# Patient Record
Sex: MALE | Race: WHITE | NOT HISPANIC OR LATINO | Employment: FULL TIME | URBAN - METROPOLITAN AREA
[De-identification: names, ages, dates, MRNs, and addresses within clinical notes are randomized per-mention and may not be internally consistent; named-entity substitution may affect disease eponyms.]

---

## 2017-06-28 ENCOUNTER — OFFICE VISIT - HEALTHEAST (OUTPATIENT)
Dept: INTERNAL MEDICINE | Facility: CLINIC | Age: 52
End: 2017-06-28

## 2017-06-28 DIAGNOSIS — R68.82 LOW LIBIDO: ICD-10-CM

## 2017-06-28 DIAGNOSIS — K76.0 FATTY LIVER: ICD-10-CM

## 2017-06-28 DIAGNOSIS — Z00.00 ROUTINE GENERAL MEDICAL EXAMINATION AT A HEALTH CARE FACILITY: ICD-10-CM

## 2017-06-28 DIAGNOSIS — Z23 NEED FOR TETANUS BOOSTER: ICD-10-CM

## 2017-06-28 LAB
CHOLEST SERPL-MCNC: 189 MG/DL
FASTING STATUS PATIENT QL REPORTED: YES
HDLC SERPL-MCNC: 39 MG/DL
LDLC SERPL CALC-MCNC: 132 MG/DL
PSA SERPL-MCNC: 0.5 NG/ML (ref 0–3.5)
TRIGL SERPL-MCNC: 90 MG/DL

## 2017-06-28 ASSESSMENT — MIFFLIN-ST. JEOR: SCORE: 2090.45

## 2018-12-09 ENCOUNTER — OFFICE VISIT (OUTPATIENT)
Dept: URGENT CARE | Facility: URGENT CARE | Age: 53
End: 2018-12-09
Payer: COMMERCIAL

## 2018-12-09 VITALS
WEIGHT: 288 LBS | DIASTOLIC BLOOD PRESSURE: 78 MMHG | RESPIRATION RATE: 20 BRPM | HEART RATE: 78 BPM | SYSTOLIC BLOOD PRESSURE: 120 MMHG | OXYGEN SATURATION: 96 % | TEMPERATURE: 98.1 F

## 2018-12-09 DIAGNOSIS — B34.9 VIRAL SYNDROME: Primary | ICD-10-CM

## 2018-12-09 DIAGNOSIS — R05.9 COUGH: ICD-10-CM

## 2018-12-09 PROCEDURE — 99213 OFFICE O/P EST LOW 20 MIN: CPT | Performed by: NURSE PRACTITIONER

## 2018-12-09 RX ORDER — BENZONATATE 200 MG/1
200 CAPSULE ORAL 3 TIMES DAILY PRN
Qty: 30 CAPSULE | Refills: 0 | Status: SHIPPED | OUTPATIENT
Start: 2018-12-09 | End: 2022-09-28

## 2018-12-09 RX ORDER — PREDNISONE 20 MG/1
40 TABLET ORAL DAILY
Qty: 10 TABLET | Refills: 0 | Status: SHIPPED | OUTPATIENT
Start: 2018-12-09 | End: 2018-12-14

## 2018-12-09 NOTE — PROGRESS NOTES
SUBJECTIVE:   Juanito Delacruz is a 53 year old male who presents to clinic today for the following health issues:  Chief Complaint   Patient presents with     Cough     Started 5 nights ago.  Not improving.  Fatigued from coughing.  Non productive cough.  Robutissin made worse                  Problem list and histories reviewed & adjusted, as indicated.  Additional history: as documented    Patient Active Problem List   Diagnosis     Chronic suppurative otitis media     Cervicalgia     Lumbago     History reviewed. No pertinent surgical history.    Social History     Tobacco Use     Smoking status: Never Smoker     Smokeless tobacco: Never Used   Substance Use Topics     Alcohol use: Yes     Comment: social     Family History   Problem Relation Age of Onset     Pancreatic Cancer Mother          Current Outpatient Medications   Medication Sig Dispense Refill     benzonatate (TESSALON) 200 MG capsule Take 1 capsule (200 mg) by mouth 3 times daily as needed for cough 30 capsule 0     predniSONE (DELTASONE) 20 MG tablet Take 40 mg by mouth daily for 5 days. 10 tablet 0     No Known Allergies  Labs reviewed in EPIC    Reviewed and updated as needed this visit by clinical staff  Tobacco  Allergies  Meds  Problems  Med Hx  Surg Hx  Fam Hx  Soc Hx        Reviewed and updated as needed this visit by Provider  Tobacco  Allergies  Problems  Med Hx  Surg Hx  Fam Hx         ROS:  Constitutional, HEENT, cardiovascular, pulmonary, GI, , musculoskeletal, neuro, skin, endocrine and psych systems are negative, except as otherwise noted.    OBJECTIVE:     /78 (BP Location: Right arm, Cuff Size: Adult Large)   Pulse 78   Temp 98.1  F (36.7  C) (Tympanic)   Resp 20   Wt 130.6 kg (288 lb)   SpO2 96%   There is no height or weight on file to calculate BMI.   GENERAL: healthy, alert and no distress, nontoxic in appearance  EYES: Eyes grossly normal to inspection, PERRL and conjunctivae and sclerae normal  HENT:  ear canals and TM's normal, nose and mouth without ulcers or lesions  NECK: no adenopathy, supple with full ROM  RESP: lungs clear to auscultation - no rales, rhonchi or wheezes  CV: regular rate and rhythm, normal S1 S2, no S3 or S4, no murmur, click or rub, no peripheral edema   ABDOMEN: soft, nontender, no hepatosplenomegaly, no masses and bowel sounds normal  MS: no gross musculoskeletal defects noted, no edema  No rash    Diagnostic Test Results:  No results found for this or any previous visit (from the past 24 hour(s)).    ASSESSMENT/PLAN:     Problem List Items Addressed This Visit     None      Visit Diagnoses     Viral syndrome    -  Primary    Relevant Medications    predniSONE (DELTASONE) 20 MG tablet    benzonatate (TESSALON) 200 MG capsule    Cough        Relevant Medications    predniSONE (DELTASONE) 20 MG tablet    benzonatate (TESSALON) 200 MG capsule               Patient Instructions     Increase rest and fluids. Tylenol and/or Ibuprofen for comfort. Cool mist vaporizer. If your symptoms worsen or do not resolve follow up with your primary care provider in 1 week and sooner if needed.     Mucinex 600 mg 12 hour formula for ear, head and chest congestion.  It can also thin post nasal drip which can cause a cough and sore throat.       Indications for emergent return to emergency department discussed with patient, who verbalized good understanding and agreement.  Patient understands the limitations of today's evaluation.           Patient Education     Viral Syndrome (Adult)  A viral illness may cause a number of symptoms such as fever. Other symptoms depend on the part of the body that the virus affects. If it settles in your nose, throat, and lungs, it may cause cough, sore throat, congestion, runny nose, headache, earache and other ear symptoms, or shortness of breath. If it settles in your stomach and intestinal tract, it may cause nausea, vomiting, cramping, and diarrhea. Sometimes it causes  "generalized symptoms like \"aching all over,\" feeling tired, loss of energy, or loss of appetite.  A viral illness usually lasts anywhere from several days to several weeks, but sometimes it lasts longer. In some cases, a more serious infection can look like a viral syndrome in the first few days of the illness. You may need another exam and additional tests to know the difference. Watch for the warning signs listed below for when to seek medical advice.  Home care  Follow these guidelines for taking care of yourself at home:    If symptoms are severe, rest at home for the first 2 to 3 days.    Stay away from cigarette smoke - both your smoke and the smoke from others.    You may use over-the-counter acetaminophen or ibuprofen for fever, muscle aching, and headache, unless another medicine was prescribed for this. If you have chronic liver or kidney disease or ever had a stomach ulcer or gastrointestinal bleeding, talk with your healthcare provider before using these medicines. No one who is younger than 18 and ill with a fever should take aspirin. It may cause severe disease or death.    Your appetite may be poor, so a light diet is fine. Avoid dehydration by drinking 8 to 12, 8-ounce glasses of fluids each day. This may include water; orange juice; lemonade; apple, grape, and cranberry juice; clear fruit drinks; electrolyte replacement and sports drinks; and decaffeinated teas and coffee. If you have been diagnosed with a kidney disease, ask your healthcare provider how much and what types of fluids you should drink to prevent dehydration. If you have kidney disease, drinking too much fluid can cause it build up in the your body and be dangerous to your health.    Over-the-counter remedies won't shorten the length of the illness but may be helpful for symptoms such as cough, sore throat, nasal and sinus congestion, or diarrhea. Don't use decongestants if you have high blood pressure.  Follow-up care  Follow up with " your healthcare provider if you do not improve over the next week.  Call 911  Call 911 if any of the following occur:    Convulsion    Feeling weak, dizzy, or like you are going to faint    Chest pain, or more than mild shortness of breath   When to seek medical advice  Call your healthcare provider right away if any of these occur:    Cough with lots of colored sputum (mucus) or blood in your sputum    Chest pain, shortness of breath, wheezing, or trouble breathing    Severe headache; face, neck, or ear pain    Severe, constant pain in the lower right side of your belly (abdominal)    Continued vomiting (can t keep liquids down)    Frequent diarrhea (more than 5 times a day); blood (red or black color) or mucus in diarrhea    Feeling weak, dizzy, or like you are going to faint    Extreme thirst    Fever of 100.4 F (38 C) or higher, or as directed by your healthcare provider  Date Last Reviewed: 4/1/2018 2000-2018 [employer]. [address] [place] [address], [place] [address]. All rights reserved. This information is not intended as a substitute for professional medical care. Always follow your healthcare professional's instructions.               CICI Wu [facility]

## 2018-12-09 NOTE — PATIENT INSTRUCTIONS
"Increase rest and fluids. Tylenol and/or Ibuprofen for comfort. Cool mist vaporizer. If your symptoms worsen or do not resolve follow up with your primary care provider in 1 week and sooner if needed.     Mucinex 600 mg 12 hour formula for ear, head and chest congestion.  It can also thin post nasal drip which can cause a cough and sore throat.       Indications for emergent return to emergency department discussed with patient, who verbalized good understanding and agreement.  Patient understands the limitations of today's evaluation.           Patient Education     Viral Syndrome (Adult)  A viral illness may cause a number of symptoms such as fever. Other symptoms depend on the part of the body that the virus affects. If it settles in your nose, throat, and lungs, it may cause cough, sore throat, congestion, runny nose, headache, earache and other ear symptoms, or shortness of breath. If it settles in your stomach and intestinal tract, it may cause nausea, vomiting, cramping, and diarrhea. Sometimes it causes generalized symptoms like \"aching all over,\" feeling tired, loss of energy, or loss of appetite.  A viral illness usually lasts anywhere from several days to several weeks, but sometimes it lasts longer. In some cases, a more serious infection can look like a viral syndrome in the first few days of the illness. You may need another exam and additional tests to know the difference. Watch for the warning signs listed below for when to seek medical advice.  Home care  Follow these guidelines for taking care of yourself at home:    If symptoms are severe, rest at home for the first 2 to 3 days.    Stay away from cigarette smoke - both your smoke and the smoke from others.    You may use over-the-counter acetaminophen or ibuprofen for fever, muscle aching, and headache, unless another medicine was prescribed for this. If you have chronic liver or kidney disease or ever had a stomach ulcer or gastrointestinal " bleeding, talk with your healthcare provider before using these medicines. No one who is younger than 18 and ill with a fever should take aspirin. It may cause severe disease or death.    Your appetite may be poor, so a light diet is fine. Avoid dehydration by drinking 8 to 12, 8-ounce glasses of fluids each day. This may include water; orange juice; lemonade; apple, grape, and cranberry juice; clear fruit drinks; electrolyte replacement and sports drinks; and decaffeinated teas and coffee. If you have been diagnosed with a kidney disease, ask your healthcare provider how much and what types of fluids you should drink to prevent dehydration. If you have kidney disease, drinking too much fluid can cause it build up in the your body and be dangerous to your health.    Over-the-counter remedies won't shorten the length of the illness but may be helpful for symptoms such as cough, sore throat, nasal and sinus congestion, or diarrhea. Don't use decongestants if you have high blood pressure.  Follow-up care  Follow up with your healthcare provider if you do not improve over the next week.  Call 911  Call 911 if any of the following occur:    Convulsion    Feeling weak, dizzy, or like you are going to faint    Chest pain, or more than mild shortness of breath   When to seek medical advice  Call your healthcare provider right away if any of these occur:    Cough with lots of colored sputum (mucus) or blood in your sputum    Chest pain, shortness of breath, wheezing, or trouble breathing    Severe headache; face, neck, or ear pain    Severe, constant pain in the lower right side of your belly (abdominal)    Continued vomiting (can t keep liquids down)    Frequent diarrhea (more than 5 times a day); blood (red or black color) or mucus in diarrhea    Feeling weak, dizzy, or like you are going to faint    Extreme thirst    Fever of 100.4 F (38 C) or higher, or as directed by your healthcare provider  Date Last Reviewed:  4/1/2018 2000-2018 [employer]. [address] [place] [address], [place] [address]. All rights reserved. This information is not intended as a substitute for professional medical care. Always follow your healthcare professional's instructions.

## 2018-12-09 NOTE — NURSING NOTE
"Chief Complaint   Patient presents with     Cough     Started 5 nights ago.  Not improving.  Fatigued from coughing.  Non productive cough.  Robutissin made worse        Initial /78 (BP Location: Right arm, Cuff Size: Adult Large)   Pulse 78   Temp 98.1  F (36.7  C) (Tympanic)   Resp 20   Wt 130.6 kg (288 lb)   SpO2 96%  Estimated body mass index is 34.03 kg/m  as calculated from the following:    Height as of 12/29/05: 1.803 m (5' 11\").    Weight as of 12/29/05: 110.7 kg (244 lb).    Patient presents to the clinic using No DME    Health Maintenance that is potentially due pending provider review:  NONE    n/a    Is there anyone who you would like to be able to receive your results? Not Applicable  If yes have patient fill out GERDA    Yoni Mcduffie M.A.    "

## 2018-12-12 ENCOUNTER — OFFICE VISIT (OUTPATIENT)
Dept: FAMILY MEDICINE | Facility: CLINIC | Age: 53
End: 2018-12-12
Payer: COMMERCIAL

## 2018-12-12 ENCOUNTER — TELEPHONE (OUTPATIENT)
Dept: FAMILY MEDICINE | Facility: CLINIC | Age: 53
End: 2018-12-12

## 2018-12-12 VITALS
BODY MASS INDEX: 39.62 KG/M2 | RESPIRATION RATE: 20 BRPM | TEMPERATURE: 97.4 F | SYSTOLIC BLOOD PRESSURE: 132 MMHG | WEIGHT: 283 LBS | HEIGHT: 71 IN | OXYGEN SATURATION: 99 % | DIASTOLIC BLOOD PRESSURE: 94 MMHG | HEART RATE: 79 BPM

## 2018-12-12 DIAGNOSIS — J01.90 ACUTE SINUSITIS, RECURRENCE NOT SPECIFIED, UNSPECIFIED LOCATION: ICD-10-CM

## 2018-12-12 DIAGNOSIS — H53.8 BLURRED VISION: Primary | ICD-10-CM

## 2018-12-12 DIAGNOSIS — R51.9 ACUTE NONINTRACTABLE HEADACHE, UNSPECIFIED HEADACHE TYPE: ICD-10-CM

## 2018-12-12 LAB — GLUCOSE BLD-MCNC: 85 MG/DL (ref 70–99)

## 2018-12-12 PROCEDURE — 99214 OFFICE O/P EST MOD 30 MIN: CPT | Performed by: NURSE PRACTITIONER

## 2018-12-12 PROCEDURE — 36415 COLL VENOUS BLD VENIPUNCTURE: CPT | Performed by: NURSE PRACTITIONER

## 2018-12-12 PROCEDURE — 82947 ASSAY GLUCOSE BLOOD QUANT: CPT | Performed by: NURSE PRACTITIONER

## 2018-12-12 RX ORDER — DOXYCYCLINE 100 MG/1
100 TABLET ORAL 2 TIMES DAILY
Qty: 20 TABLET | Refills: 0 | Status: SHIPPED | OUTPATIENT
Start: 2018-12-12 | End: 2018-12-22

## 2018-12-12 ASSESSMENT — MIFFLIN-ST. JEOR: SCORE: 2137.87

## 2018-12-12 NOTE — NURSING NOTE
"Chief Complaint   Patient presents with     Cough       Initial BP (!) 132/94   Pulse 79   Temp 97.4  F (36.3  C) (Tympanic)   Resp 20   Ht 1.791 m (5' 10.5\")   Wt 128.4 kg (283 lb)   SpO2 99%   BMI 40.03 kg/m   Estimated body mass index is 40.03 kg/m  as calculated from the following:    Height as of this encounter: 1.791 m (5' 10.5\").    Weight as of this encounter: 128.4 kg (283 lb).    Patient presents to the clinic using No DME    Health Maintenance that is potentially due pending provider review:  Colonoscopy/FIT and lipid screening     Had a colonoscopy done already. Patient is normally seen through [facility].     Is there anyone who you would like to be able to receive your results? No  If yes have patient fill out GERDA      "

## 2018-12-12 NOTE — PROGRESS NOTES
SUBJECTIVE:   Juanito Delacruz is a 53 year old male who presents to clinic today for the following health issues:      Headaches      Duration: over 24 hours     Description  Location: brain stem and up to the back of head    Character: feels like a balloon expanding in his brain. Pain goes behind the back of his eyes as well.   Frequency:  Constant for the 24 hours or more   Duration:  Constant     Intensity:  severe    Accompanying signs and symptoms:    Precipitating or Alleviating factors:  Nausea/vomiting: no  Dizziness: occasionally and sometimes  Weakness or numbness: no  Visual changes: Couldn't read the road signs the other day while driving   Fever: no   Coughing makes the headache worse.   Sinus or URI symptoms YES- cough, no sinus pressure, no shortness of breath or wheezing.     History  Head trauma: no  Family history of migraines: no  Previous tests for headaches: no  Neurologist evaluations: no  Able to do daily activities when headache present: states he is restricted.   Wake with headaches: YES  Daily pain medication use: YES- advil every 6 hours.   Any changes in: Nothing other than being sick. His uncles  was Monday but it was expected.     Precipitating or Alleviating factors (light/sound/sleep/caffeine): None   Therapies tried and outcome: Ibuprofen (Advil, Motrin), Caffeine- does not help           Problem list and histories reviewed & adjusted, as indicated.  Additional history: as documented    Patient Active Problem List   Diagnosis     Chronic suppurative otitis media     Cervicalgia     Lumbago     History reviewed. No pertinent surgical history.    Social History     Tobacco Use     Smoking status: Never Smoker     Smokeless tobacco: Never Used   Substance Use Topics     Alcohol use: Yes     Comment: social     Family History   Problem Relation Age of Onset     Pancreatic Cancer Mother          Current Outpatient Medications   Medication Sig Dispense Refill     benzonatate  "(TESSALON) 200 MG capsule Take 1 capsule (200 mg) by mouth 3 times daily as needed for cough 30 capsule 0     doxycycline monohydrate (ADOXA) 100 MG tablet Take 1 tablet (100 mg) by mouth 2 times daily for 10 days 20 tablet 0     predniSONE (DELTASONE) 20 MG tablet Take 40 mg by mouth daily for 5 days. (Patient not taking: Reported on 12/12/2018.) 10 tablet 0     No Known Allergies  Labs reviewed in EPIC    Reviewed and updated as needed this visit by clinical staff  Tobacco  Allergies  Meds  Problems  Med Hx  Surg Hx  Fam Hx  Soc Hx        Reviewed and updated as needed this visit by Provider  Tobacco  Allergies  Meds  Problems  Med Hx  Surg Hx  Fam Hx         ROS:  Constitutional, HEENT, cardiovascular, pulmonary, GI, , musculoskeletal, neuro, skin, endocrine and psych systems are negative, except as otherwise noted.    OBJECTIVE:     BP (!) 132/94   Pulse 79   Temp 97.4  F (36.3  C) (Tympanic)   Resp 20   Ht 1.791 m (5' 10.5\")   Wt 128.4 kg (283 lb)   SpO2 99%   BMI 40.03 kg/m    Body mass index is 40.03 kg/m .   GENERAL: healthy, alert and mild distress due to headache  EYES: Eyes grossly normal to inspection, PERRL and conjunctivae and sclerae normal  HENT: ear canals and TM's normal, nose and mouth without ulcers or lesions  NECK: no adenopathy, supple with full ROM  RESP: lungs clear to auscultation - no rales, rhonchi or wheezes  CV: regular rate and rhythm, normal S1 S2, no S3 or S4, no murmur, click or rub, no peripheral edema   ABDOMEN: soft, nontender, no hepatosplenomegaly, no masses and bowel sounds normal  MS: no gross musculoskeletal defects noted, no edema  CN 2-12 intact    Diagnostic Test Results:  Results for orders placed or performed in visit on 12/12/18 (from the past 24 hour(s))   Glucose whole blood   Result Value Ref Range    Glucose Whole Blood 85 70 - 99 mg/dL       ASSESSMENT/PLAN:   Pt is refusing to get head CT today per my request. He feels it could be increased " intracranial pressure from the prednisone as his wife looked up side effects and she is a pharmacist. I explicity asked him to have this scan today. He wishes to wait and start antibiotics for sinusitis. He states he will get one in the morning at [facility] if he is worse or not better. He understands that this is against my medical advice as I would like him to get the CT scan of head stat today. His blood sugar is 85 as I wanted to make sure the above mentioned blurred vision was not due to undiagnosed high blood sugar. Pt requested CT order on paper so he could take it to [facility].  Problem List Items Addressed This Visit     None      Visit Diagnoses     Blurred vision    -  Primary    Relevant Orders    Glucose whole blood (Completed)    Acute nonintractable headache, unspecified headache type        Relevant Orders    CT Head w/o Contrast    Acute sinusitis, recurrence not specified, unspecified location        Relevant Medications    doxycycline monohydrate (ADOXA) 100 MG tablet               Patient Instructions   I want you to get the head CT today to rule out any significant problem that needs immediate attention.    You can start taking doxycycline in the event this is sinus related.    If your CT scan is showing an issue that needs immediate attention you will need to go directly to the emergency room.        35 minutes spent with this patient greater than 50% in face to face counseling regarding the above.    CICI Wu CNP  [facility]

## 2018-12-12 NOTE — TELEPHONE ENCOUNTER
Reason for Call:  Other call back    Detailed comments: Pt was seen in  on Sunday for respiratory issue and given prednisone. During the day yesterday, late morning, he developed a bad headache. A headache like he never had before. Is this a side effect of prednisone.    Phone Number Patient can be reached at: Other phone number:  [phone]    Best Time: any    Can we leave a detailed message on this number?     Call taken on 12/12/2018 at 8:02 AM by Sharifa Monterroso

## 2018-12-12 NOTE — TELEPHONE ENCOUNTER
S-(situation): patient call transferred to me due to C/O worst headache ever starting yesterday.  Advil no help.  Massage no help.  Headache is worse when coughs.  Drinking helped to suppress the cough.  Prednisone has helped relieve the chest tightness.  Has two days of prednisone left ( did not take AM dose because thinks this is causing headache) .  He says his wife is a pharmacist and she looked up side effects of prednisone and one was intercranial pressure.    He does not sound like has worst headache ever while talking with him.  States he is reluctant to cough because headache gets worse.  No fever    B-(background): was seen in urgent care on 12-8-18.  Is taking ibuprofen 600 mg q 6 hours he says.      A-(assessment): headache, chest tightness better cough    R-(recommendations): appointment made for him to be seen  Asia Damon RN

## 2018-12-12 NOTE — PATIENT INSTRUCTIONS
I want you to get the head CT today to rule out any significant problem that needs immediate attention.    You can start taking doxycycline in the event this is sinus related.    If your CT scan is showing an issue that needs immediate attention you will need to go directly to the emergency room.      Patient Education      * Headache (Unspecified)    The cause of your headache today is not clear, but it does not appear to be the sign of any serious illness.  Under stress, some people tense the muscles of their shoulder, neck and scalp without knowing it. If this condition lasts long enough, a TENSION HEADACHE can occur.  A MIGRAINE HEADACHE is caused by changes in blood flow to the brain. It can be mild or severe. A migraine attack may be triggered by emotional stress, hormone changes during the menstrual cycle, oral contraceptives, alcohol use, certain foods containing tyramine, eye strain, weather changes, missing meals, lack of sleep or oversleeping.  Other causes of headache include a viral illness, sinus, ear or throat infection, dental pain and TMJ (jaw joint) pain.  Home Care:    If you were given pain medicine for this headache, do not drive yourself home. Arrange for a ride, instead. When you get home, try to sleep. You should feel much better when you wake up.    If you are having nausea or vomiting, follow a light diet until your headache is relieved.    If you have a migraine type headache, use sunglasses when in the daylight or around bright indoor lighting until symptoms improve. Bright glaring light can worsen this kind of headache.  Follow Up  with your doctor if the headache is not better within the next 24 hours. If you have frequent headaches you should discuss a treatment plan with your primary care doctor. By being aware of the earliest signs of headache, and starting treatment right away, you may be able to stop the pain yourself.  Get Prompt Medical Attention  if any of the following  occur:    Worsening of your head pain or no improvement within 24 hours    Repeated vomiting (unable to keep liquids down)    Fever over 101 F (38.3 C)    Stiff neck    Extreme drowsiness, confusion or fainting    Weakness of an arm or leg or one side of the face    Difficulty with speech or vision    [id number] [employer]. [address] [place] [address]. All rights reserved. This information is not intended as a substitute for professional medical care. Always follow your healthcare professional's instructions.  This information has been modified by your health care provider with permission from the publisher.  Modifications clinically reviewed by Dr. Andrew Zavaleta on 7/20/18.

## 2019-01-14 ENCOUNTER — OFFICE VISIT - HEALTHEAST (OUTPATIENT)
Dept: FAMILY MEDICINE | Facility: CLINIC | Age: 54
End: 2019-01-14

## 2019-01-14 DIAGNOSIS — R05.9 COUGH: ICD-10-CM

## 2019-01-29 ENCOUNTER — OFFICE VISIT - HEALTHEAST (OUTPATIENT)
Dept: FAMILY MEDICINE | Facility: CLINIC | Age: 54
End: 2019-01-29

## 2019-01-29 DIAGNOSIS — R05.9 COUGH: ICD-10-CM

## 2019-01-29 DIAGNOSIS — H69.91 DYSFUNCTION OF RIGHT EUSTACHIAN TUBE: ICD-10-CM

## 2019-12-11 ENCOUNTER — RECORDS - HEALTHEAST (OUTPATIENT)
Dept: GENERAL RADIOLOGY | Facility: CLINIC | Age: 54
End: 2019-12-11

## 2019-12-11 ENCOUNTER — OFFICE VISIT - HEALTHEAST (OUTPATIENT)
Dept: INTERNAL MEDICINE | Facility: CLINIC | Age: 54
End: 2019-12-11

## 2019-12-11 DIAGNOSIS — Z12.5 SCREENING FOR PROSTATE CANCER: ICD-10-CM

## 2019-12-11 DIAGNOSIS — Z12.11 SCREEN FOR COLON CANCER: ICD-10-CM

## 2019-12-11 DIAGNOSIS — Z13.1 SCREENING FOR DIABETES MELLITUS: ICD-10-CM

## 2019-12-11 DIAGNOSIS — Z86.0100 HISTORY OF COLONIC POLYPS: ICD-10-CM

## 2019-12-11 DIAGNOSIS — M79.671 PAIN IN RIGHT FOOT: ICD-10-CM

## 2019-12-11 DIAGNOSIS — E66.01 SEVERE OBESITY (H): ICD-10-CM

## 2019-12-11 DIAGNOSIS — Z13.220 SCREENING FOR HYPERLIPIDEMIA: ICD-10-CM

## 2019-12-11 DIAGNOSIS — K76.0 FATTY LIVER: ICD-10-CM

## 2019-12-11 DIAGNOSIS — M79.671 RIGHT FOOT PAIN: ICD-10-CM

## 2019-12-11 DIAGNOSIS — Z00.00 ROUTINE GENERAL MEDICAL EXAMINATION AT A HEALTH CARE FACILITY: ICD-10-CM

## 2019-12-11 DIAGNOSIS — I70.0 ATHEROSCLEROSIS OF ABDOMINAL AORTA (H): ICD-10-CM

## 2019-12-11 DIAGNOSIS — G47.33 OSA ON CPAP: ICD-10-CM

## 2019-12-11 ASSESSMENT — MIFFLIN-ST. JEOR: SCORE: 2149.42

## 2019-12-12 ENCOUNTER — AMBULATORY - HEALTHEAST (OUTPATIENT)
Dept: LAB | Facility: HOSPITAL | Age: 54
End: 2019-12-12

## 2019-12-12 DIAGNOSIS — Z12.5 SCREENING FOR PROSTATE CANCER: ICD-10-CM

## 2019-12-12 DIAGNOSIS — Z13.1 SCREENING FOR DIABETES MELLITUS: ICD-10-CM

## 2019-12-12 DIAGNOSIS — K76.0 FATTY LIVER: ICD-10-CM

## 2019-12-12 DIAGNOSIS — Z13.220 SCREENING FOR HYPERLIPIDEMIA: ICD-10-CM

## 2019-12-12 LAB
ALBUMIN SERPL-MCNC: 4 G/DL (ref 3.5–5)
ALP SERPL-CCNC: 42 U/L (ref 45–120)
ALT SERPL W P-5'-P-CCNC: 37 U/L (ref 0–45)
ANION GAP SERPL CALCULATED.3IONS-SCNC: 6 MMOL/L (ref 5–18)
AST SERPL W P-5'-P-CCNC: 23 U/L (ref 0–40)
BILIRUB SERPL-MCNC: 0.5 MG/DL (ref 0–1)
BUN SERPL-MCNC: 18 MG/DL (ref 8–22)
CALCIUM SERPL-MCNC: 9.2 MG/DL (ref 8.5–10.5)
CHLORIDE BLD-SCNC: 107 MMOL/L (ref 98–107)
CHOLEST SERPL-MCNC: 178 MG/DL
CO2 SERPL-SCNC: 29 MMOL/L (ref 22–31)
CREAT SERPL-MCNC: 1.08 MG/DL (ref 0.7–1.3)
ERYTHROCYTE [DISTWIDTH] IN BLOOD BY AUTOMATED COUNT: 12.8 % (ref 11–14.5)
FASTING STATUS PATIENT QL REPORTED: YES
GFR SERPL CREATININE-BSD FRML MDRD: >60 ML/MIN/1.73M2
GLUCOSE BLD-MCNC: 99 MG/DL (ref 70–125)
HCT VFR BLD AUTO: 43.6 % (ref 40–54)
HDLC SERPL-MCNC: 36 MG/DL
HGB BLD-MCNC: 14.7 G/DL (ref 14–18)
LDLC SERPL CALC-MCNC: 122 MG/DL
MCH RBC QN AUTO: 31.4 PG (ref 27–34)
MCHC RBC AUTO-ENTMCNC: 33.7 G/DL (ref 32–36)
MCV RBC AUTO: 93 FL (ref 80–100)
PLATELET # BLD AUTO: 159 THOU/UL (ref 140–440)
PMV BLD AUTO: 10.6 FL (ref 8.5–12.5)
POTASSIUM BLD-SCNC: 4.3 MMOL/L (ref 3.5–5)
PROT SERPL-MCNC: 6.9 G/DL (ref 6–8)
PSA SERPL-MCNC: 0.4 NG/ML (ref 0–3.5)
RBC # BLD AUTO: 4.68 MILL/UL (ref 4.4–6.2)
SODIUM SERPL-SCNC: 142 MMOL/L (ref 136–145)
TRIGL SERPL-MCNC: 100 MG/DL
WBC: 7.3 THOU/UL (ref 4–11)

## 2019-12-13 LAB — HBA1C MFR BLD: 5.4 % (ref 4.2–6.1)

## 2019-12-16 ENCOUNTER — RECORDS - HEALTHEAST (OUTPATIENT)
Dept: RADIOLOGY | Facility: CLINIC | Age: 54
End: 2019-12-16

## 2020-02-27 ENCOUNTER — COMMUNICATION - HEALTHEAST (OUTPATIENT)
Dept: INTERNAL MEDICINE | Facility: CLINIC | Age: 55
End: 2020-02-27

## 2020-02-27 ENCOUNTER — AMBULATORY - HEALTHEAST (OUTPATIENT)
Dept: INTERNAL MEDICINE | Facility: CLINIC | Age: 55
End: 2020-02-27

## 2020-02-27 DIAGNOSIS — K20.90 ESOPHAGITIS, UNSPECIFIED: ICD-10-CM

## 2020-02-28 ENCOUNTER — COMMUNICATION - HEALTHEAST (OUTPATIENT)
Dept: SCHEDULING | Facility: CLINIC | Age: 55
End: 2020-02-28

## 2020-02-28 ENCOUNTER — AMBULATORY - HEALTHEAST (OUTPATIENT)
Dept: INTERNAL MEDICINE | Facility: CLINIC | Age: 55
End: 2020-02-28

## 2020-02-28 DIAGNOSIS — K20.90 ESOPHAGITIS, UNSPECIFIED: ICD-10-CM

## 2020-06-11 ENCOUNTER — RECORDS - HEALTHEAST (OUTPATIENT)
Dept: ADMINISTRATIVE | Facility: OTHER | Age: 55
End: 2020-06-11

## 2021-01-13 ENCOUNTER — COMMUNICATION - HEALTHEAST (OUTPATIENT)
Dept: INTERNAL MEDICINE | Facility: CLINIC | Age: 56
End: 2021-01-13

## 2021-01-13 DIAGNOSIS — Z00.00 ROUTINE HEALTH MAINTENANCE: ICD-10-CM

## 2021-01-14 ENCOUNTER — AMBULATORY - HEALTHEAST (OUTPATIENT)
Dept: LAB | Facility: CLINIC | Age: 56
End: 2021-01-14

## 2021-01-14 DIAGNOSIS — Z00.00 ROUTINE HEALTH MAINTENANCE: ICD-10-CM

## 2021-01-21 ENCOUNTER — COMMUNICATION - HEALTHEAST (OUTPATIENT)
Dept: SCHEDULING | Facility: CLINIC | Age: 56
End: 2021-01-21

## 2021-05-24 ENCOUNTER — COMMUNICATION - HEALTHEAST (OUTPATIENT)
Dept: INTERNAL MEDICINE | Facility: CLINIC | Age: 56
End: 2021-05-24

## 2021-05-24 DIAGNOSIS — K76.0 FATTY LIVER: ICD-10-CM

## 2021-05-24 DIAGNOSIS — Z13.1 SCREENING FOR DIABETES MELLITUS: ICD-10-CM

## 2021-05-24 DIAGNOSIS — Z12.5 SCREENING FOR PROSTATE CANCER: ICD-10-CM

## 2021-05-24 DIAGNOSIS — Z13.220 SCREENING FOR HYPERLIPIDEMIA: ICD-10-CM

## 2021-05-31 VITALS — BODY MASS INDEX: 38.22 KG/M2 | HEIGHT: 71 IN | WEIGHT: 273 LBS

## 2021-06-02 ENCOUNTER — RECORDS - HEALTHEAST (OUTPATIENT)
Dept: ADMINISTRATIVE | Facility: CLINIC | Age: 56
End: 2021-06-02

## 2021-06-02 VITALS — WEIGHT: 287.5 LBS | BODY MASS INDEX: 40.67 KG/M2

## 2021-06-02 VITALS — WEIGHT: 287 LBS | BODY MASS INDEX: 40.6 KG/M2

## 2021-06-03 ENCOUNTER — AMBULATORY - HEALTHEAST (OUTPATIENT)
Dept: LAB | Facility: HOSPITAL | Age: 56
End: 2021-06-03

## 2021-06-03 DIAGNOSIS — Z13.1 SCREENING FOR DIABETES MELLITUS: ICD-10-CM

## 2021-06-03 DIAGNOSIS — K76.0 FATTY LIVER: ICD-10-CM

## 2021-06-03 DIAGNOSIS — Z12.5 SCREENING FOR PROSTATE CANCER: ICD-10-CM

## 2021-06-03 DIAGNOSIS — Z13.220 SCREENING FOR HYPERLIPIDEMIA: ICD-10-CM

## 2021-06-03 LAB
ALBUMIN SERPL-MCNC: 4.1 G/DL (ref 3.5–5)
ALP SERPL-CCNC: 47 U/L (ref 45–120)
ALT SERPL W P-5'-P-CCNC: 51 U/L (ref 0–45)
ANION GAP SERPL CALCULATED.3IONS-SCNC: 6 MMOL/L (ref 5–18)
AST SERPL W P-5'-P-CCNC: 30 U/L (ref 0–40)
BILIRUB SERPL-MCNC: 0.6 MG/DL (ref 0–1)
BUN SERPL-MCNC: 16 MG/DL (ref 8–22)
CALCIUM SERPL-MCNC: 8.9 MG/DL (ref 8.5–10.5)
CHLORIDE BLD-SCNC: 108 MMOL/L (ref 98–107)
CHOLEST SERPL-MCNC: 168 MG/DL
CO2 SERPL-SCNC: 27 MMOL/L (ref 22–31)
CREAT SERPL-MCNC: 0.97 MG/DL (ref 0.7–1.3)
ERYTHROCYTE [DISTWIDTH] IN BLOOD BY AUTOMATED COUNT: 12.6 % (ref 11–14.5)
FASTING STATUS PATIENT QL REPORTED: YES
GFR SERPL CREATININE-BSD FRML MDRD: >60 ML/MIN/1.73M2
GLUCOSE BLD-MCNC: 122 MG/DL (ref 70–125)
HBA1C MFR BLD: 5.7 %
HCT VFR BLD AUTO: 43.1 % (ref 40–54)
HDLC SERPL-MCNC: 35 MG/DL
HGB BLD-MCNC: 14.7 G/DL (ref 14–18)
LDLC SERPL CALC-MCNC: 109 MG/DL
MCH RBC QN AUTO: 31.1 PG (ref 27–34)
MCHC RBC AUTO-ENTMCNC: 34.1 G/DL (ref 32–36)
MCV RBC AUTO: 91 FL (ref 80–100)
PLATELET # BLD AUTO: 148 THOU/UL (ref 140–440)
PMV BLD AUTO: 11.6 FL (ref 8.5–12.5)
POTASSIUM BLD-SCNC: 4.4 MMOL/L (ref 3.5–5)
PROT SERPL-MCNC: 7 G/DL (ref 6–8)
PSA SERPL-MCNC: 0.4 NG/ML (ref 0–3.5)
RBC # BLD AUTO: 4.72 MILL/UL (ref 4.4–6.2)
SODIUM SERPL-SCNC: 141 MMOL/L (ref 136–145)
TRIGL SERPL-MCNC: 122 MG/DL
WBC: 6.2 THOU/UL (ref 4–11)

## 2021-06-04 ENCOUNTER — OFFICE VISIT - HEALTHEAST (OUTPATIENT)
Dept: INTERNAL MEDICINE | Facility: CLINIC | Age: 56
End: 2021-06-04

## 2021-06-04 VITALS
SYSTOLIC BLOOD PRESSURE: 136 MMHG | HEART RATE: 79 BPM | BODY MASS INDEX: 40.04 KG/M2 | WEIGHT: 286 LBS | DIASTOLIC BLOOD PRESSURE: 86 MMHG | HEIGHT: 71 IN | OXYGEN SATURATION: 98 %

## 2021-06-04 DIAGNOSIS — E66.01 MORBID OBESITY (H): ICD-10-CM

## 2021-06-04 DIAGNOSIS — G47.33 OSA ON CPAP: ICD-10-CM

## 2021-06-04 DIAGNOSIS — L98.9 SKIN LESION: ICD-10-CM

## 2021-06-04 DIAGNOSIS — I70.0 ATHEROSCLEROSIS OF ABDOMINAL AORTA (H): ICD-10-CM

## 2021-06-04 DIAGNOSIS — K76.0 FATTY LIVER: ICD-10-CM

## 2021-06-04 DIAGNOSIS — Z86.0100 HISTORY OF COLONIC POLYPS: ICD-10-CM

## 2021-06-04 DIAGNOSIS — Z00.00 ROUTINE GENERAL MEDICAL EXAMINATION AT A HEALTH CARE FACILITY: ICD-10-CM

## 2021-06-04 DIAGNOSIS — R00.2 PALPITATIONS: ICD-10-CM

## 2021-06-04 DIAGNOSIS — R73.01 IMPAIRED FASTING GLUCOSE: ICD-10-CM

## 2021-06-04 ASSESSMENT — MIFFLIN-ST. JEOR: SCORE: 2134.21

## 2021-06-06 NOTE — TELEPHONE ENCOUNTER
Referral Request  Type of referral: Upper Endoscopy  Who s requesting: Patient  Why the request: Reflux, Esophagitis  Have you been seen for this request: Yes  Does patient have a preference on a group/provider? [facility]  Okay to leave a detailed message?  Yes

## 2021-06-11 NOTE — PROGRESS NOTES
Juanito presents today for a physical exam as well as to establish care.  Please see the physical exam forms a and B for further details, including a complete review of systems.    ILLNESSES, HOSPITALIZATIONS, AND OPERATIONS:    #1.  History of fatty liver    Juanito has a few complaints today.  He states that he had a remote history of depression but has not had any signs or symptoms of this for the last few years.  He is requesting that this is taken off of his problem list.  He is been having some issues with memory difficulty as well, specifically forgetting a lists of things to do.  He has no difficulties with higher-level cognitive function, however.  We briefly discussed her neuropsychiatric testing which he does not believe he needs at this time.  He has been having a lower libido over the last few months as well and is requesting to get his testosterone checked.  He is otherwise doing well.    No other significant surgical or medical history.  He is not on any prescription medications and has no known drug allergies.  No family history of prostate cancer or colon cancer.  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  He is a non-smoker.  He is a minimal drinker.    OBJECTIVE:    In general the patient is alert pleasant and in no acute distress.    HEENT: Pupils equal round reactive light.  Oropharynx is clear.  Lymphatic shows no anterior posterior cervical lymphadenopathy.  No thyromegaly or other masses noted.    Cardiovascular: S1-S2 regular in rhythm no murmurs gallops rubs    Lungs are clear    Abdomen is soft nontender nondistended.  No HSM.    Extremities show no pedal edema present bilaterally.  DP pulses are 2+ and normal bilaterally.    Skin exam shows no concerning skin lesions.    Assessment and plan: Physical exam along with the followin.  Fatty liver.  Check a set of LFTs.  2.  Low libido.  Check a total and bioavailable testosterone level.  3.  Healthcare maintenance  check a PSA, glucose, and lipid.  Colon cancer screening is up-to-date.  Vaccinations are up-to-date.  Follow-up 1 year, earlier if needed.

## 2021-06-17 NOTE — PATIENT INSTRUCTIONS - HE
Patient Instructions by Shayan Valentin PA-C at 1/14/2019 12:50 PM     Author: Shayan Valentin PA-C Service: -- Author Type: Physician Assistant    Filed: 1/14/2019  3:27 PM Encounter Date: 1/14/2019 Status: Addendum    : Shayan Valentin PA-C (Physician Assistant)    Related Notes: Original Note by Shayan Valentin PA-C (Physician Assistant) filed at 1/14/2019  1:52 PM       You were seen today for a cough.     1. Drink plenty of non-caffeinated fluids  2. Avoid smoke exposure  3. May use DETROMETHORPHAN to help suppress the cough in addition to the albuterol  4. May use tylenol or ibuprofen for discomfort    If symptoms have not improved in 5 days, follow-up with your primary care provider.    Reasons to return for re-evaluation:  - Fever 100.4 or higher  - Cough changes, coughing blood, coughing up more phlegm  - Not able to keep down fluids    Other things to try:  - Honey  - Efraín's VaporRub  - Albuerol      Patient Education     Pneumonia (Adult)  Pneumonia is an infection deep within the lungs. It is in the small air sacs (alveoli). Pneumonia may be caused by a virus or bacteria. Pneumonia caused by bacteria is usually treated with an antibiotic. Severe cases may need to be treated in the hospital. Milder cases can be treated at home. Symptoms usually start to get better during the first 2 days of treatment.    Home care  Follow these guidelines when caring for yourself at home:    Rest at home for the first 2 to 3 days, or until you feel stronger. Dont let yourself get overly tired when you go back to your activities.    Stay away from cigarette smoke - yours or other peoples.    You may use acetaminophen or ibuprofen to control fever or pain, unless another medicine was prescribed. If you have chronic liver or kidney disease, talk with your healthcare provider before using these medicines. Also talk with your provider if youve had a stomach ulcer or gastrointestinal bleeding. Dont give aspirin to anyone younger  than 18 years of age who is ill with a fever. It may cause severe liver damage.    Your appetite may be poor, so a light diet is fine.    Drink 6 to 8 glasses of fluids every day to make sure you are getting enough fluids. Beverages can include water, sport drinks, sodas without caffeine, juices, tea, or soup. Fluids will help loosen secretions in the lung. This will make it easier for you to cough up the phlegm (sputum). If you also have heart or kidney disease, check with your healthcare provider before you drink extra fluids.    Take antibiotic medicine prescribed until it is all gone, even if you are feeling better after a few days.  Follow-up care  Follow up with your healthcare provider in the next 2 to 3 days, or as advised. This is to be sure the medicine is helping you get better.  If you are 65 or older, you should get a pneumococcal vaccine and a yearly flu (influenza) shot. You should also get these vaccines if you have chronic lung disease like asthma, emphysema, or COPD. Recently, a second type of pneumonia vaccine has become available for everyone over 65 years old. This is in addition to the previous vaccine. Ask your provider about this.  When to seek medical advice  Call your healthcare provider right away if any of these occur:    You dont get better within the first 48 hours of treatment    Shortness of breath gets worse    Rapid breathing (more than 25 breaths per minute)    Coughing up blood    Chest pain gets worse with breathing    Fever of 100.4 F (38 C) or higher that doesnt get better with fever medicine    Weakness, dizziness, or fainting that gets worse    Thirst or dry mouth that gets worse    Sinus pain, headache, or a stiff neck    Chest pain not caused by coughing  Date Last Reviewed: 1/1/2017 2000-2017 [employer]. [address] [place] [address], [place] [address]. All rights reserved. This information is not intended as a substitute for professional medical care. Always  follow your healthcare professional's instructions.         a

## 2021-06-23 NOTE — PROGRESS NOTES
CC: Cough    HPI:      Patient is here for a mostly nonproductive cough since early December 2018, partially improved with recent courses of Prednisone, Doxycycline, and Cefdinir. He also reported having plugged ears without significant pain. His cough has interrupted his sleep at night. He has not taken any cough meds. No fever, significant sore throat nor shortness of breath when not coughing.    ROS: Pertinent ROS noted in HPI.     No Known Allergies    Patient Active Problem List   Diagnosis     Obesity     Fatty liver       No family history on file.    Social History     Socioeconomic History     Marital status:      Spouse name: Not on file     Number of children: Not on file     Years of education: Not on file     Highest education level: Not on file   Social Needs     Financial resource strain: Not on file     Food insecurity - worry: Not on file     Food insecurity - inability: Not on file     Transportation needs - medical: Not on file     Transportation needs - non-medical: Not on file   Occupational History     Not on file   Tobacco Use     Smoking status: Never Smoker     Smokeless tobacco: Never Used   Substance and Sexual Activity     Alcohol use: Not on file     Drug use: Not on file     Sexual activity: Not on file   Other Topics Concern     Not on file   Social History Narrative     Not on file         Objective:    Vitals:    01/29/19 1059   BP: 133/88   Pulse: 100   Resp: 20   Temp: 98.2  F (36.8  C)   SpO2: 96%       Gen:NAD  Throat: oropharynx clear, tonsils normal  Ears: Mild right ear effusion, L TM clear without effusion, ear canals normal with minimal cerumen  Nose: No discharge  Neck: no adenopathy   CV: RRR, normal S1S2,no M, R, G  Pulm: CTAB, normal effort    CXR - no acute abnormalities per my interpretation, discussed during visit.    Xr Chest 2 Views    Result Date: 1/29/2019  EXAM DATE:         01/29/2019 [facility] X-RAY CHEST, 2 VIEWS, FRONTAL AND LATERAL  1/29/2019 11:15 AM INDICATION: Cough. COMPARISON: None. FINDINGS: No consolidation or definitive pneumonia in the lungs. No pleural effusion. Upper normal heart size. Aortic atherosclerosis.       Cough - discussed differentials including postnasal drip vs GERD vs others.   -     XR Chest 2 Views  -     codeine-guaiFENesin (GUAIFENESIN AC)  mg/5 mL liquid; Take 10 mL by mouth at bedtime as needed.    Dysfunction of right eustachian tube  -     cetirizine (ZYRTEC) 10 MG tablet; Take 1 tablet (10 mg total) by mouth daily.  -     fluticasone (FLONASE) 50 mcg/actuation nasal spray; Administer two sprays to each nostril once daily.

## 2021-06-26 NOTE — PROGRESS NOTES
Office Visit - Physical   Juanito Delacruz   55 y.o.  male    Date of visit: 6/4/2021  Physician: Archie Mosher MD     Assessment and Plan   1. Routine general medical examination at a health care facility  This is a 55-year-old man with issues as discussed below.  He is going to look into shingles vaccination with his insurance    2. History of colonic polyps  Colonoscopy per routine    3. Palpitations  Certainly at risk for atrial fibrillation, check event monitor  - NELY Monitor Hook-Up; Future    4. JUAN on CPAP    5. Atherosclerosis of abdominal aorta (H)  Recommend he start statin  - atorvastatin (LIPITOR) 20 MG tablet; Take 1 tablet (20 mg total) by mouth daily.  Dispense: 90 tablet; Refill: 3    6. Fatty liver  Discussed importance of low carbohydrate diet reduction in calories, weight loss and modest exercise    7. Impaired fasting glucose  As above    8. Skin lesion  Recommend he see dermatology    9. Morbid obesity (H)  The following high BMI interventions were performed this visit: encouragement to exercise and lifestyle education regarding diet    Return in about 6 months (around 12/4/2021) for recheck.     Chief Complaint   Chief Complaint   Patient presents with     Annual Exam     fasting        Patient Profile   Social History     Social History Narrative    Lives with his wife, Dara.  Son Ted (2002) and Sophie (2004).  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          Past Medical History   Patient Active Problem List   Diagnosis     Obesity     Fatty liver     JUAN on CPAP     History of colonic polyps     Morbid obesity (H)     Impaired fasting glucose       Past Surgical History  He has a past surgical history that includes No past surgeries.     History of Present Illness   This 55 y.o. old man comes in for annual physical.  He had Covid in December and still has some occasional body aches and slight pleuritic chest pain.  This is  "improving.  He gained about 20 pounds in this time.  He has had a lot of stress at work and is having to work many different types of shifts including overnight.  He has had occasional palpitations, occurring on most days but not every day.  He does have sleep apnea and uses CPAP.  He has had some plaque noted on his aorta on imaging studies and not on a statin.  We did labs for this visit and he does have mild transaminitis and slight increase in his A1c.    Review of Systems: A comprehensive review of systems was negative except as noted.     Medications and Allergies   Current Outpatient Medications   Medication Sig Dispense Refill     multivitamin with minerals (THERA-M) 9 mg iron-400 mcg Tab tablet Take 1 tablet by mouth daily.       atorvastatin (LIPITOR) 20 MG tablet Take 1 tablet (20 mg total) by mouth daily. 90 tablet 3     No current facility-administered medications for this visit.      No Known Allergies     Family and Social History   Family History   Problem Relation Age of Onset     Pancreatic cancer Mother         islet cell     Diabetes Father      Neuropathy Father      No Medical Problems Sister      Obesity Brother      No Medical Problems Brother         Social History     Tobacco Use     Smoking status: Never Smoker     Smokeless tobacco: Never Used   Substance Use Topics     Alcohol use: Yes     Frequency: Never     Comment: rare     Drug use: Never        Physical Exam   General Appearance:   No acute distress    /86 (Patient Site: Left Arm, Patient Position: Sitting, Cuff Size: Adult Regular)   Pulse 66   Ht 5' 10.5\" (1.791 m)   Wt (!) 285 lb 8 oz (129.5 kg)   SpO2 99%   BMI 40.39 kg/m      EYES: Eyelids, conjunctiva, and sclera were normal. Pupils were normal. Cornea, iris, and lens were normal bilaterally.  HEAD, EARS, NOSE, MOUTH, AND THROAT: Head and face were normal. Hearing was normal to voice and the ears were normal to external exam. Nose appearance was normal and there " was no discharge. Oropharynx was normal.  NECK: Neck appearance was normal. There were no neck masses and the thyroid was not enlarged.  RESPIRATORY: Breathing pattern was normal and the chest moved symmetrically.  Percussion/auscultatory percussion was normal.  Lung sounds were normal and there were no abnormal sounds.  CARDIOVASCULAR: Heart rate and rhythm were normal.  S1 and S2 were normal and there were no extra sounds or murmurs. Peripheral pulses in arms and legs were normal.  Jugular venous pressure was normal.  There was no peripheral edema.  GASTROINTESTINAL: The abdomen was normal in contour.  Bowel sounds were present.  Percussion detected no organ enlargement or tenderness.  Palpation detected no tenderness, mass, or enlarged organs.   MUSCULOSKELETAL: Skeletal configuration was normal and muscle mass was normal for age. Joint appearance was overall normal.  LYMPHATIC: There were no enlarged nodes.  SKIN/HAIR/NAILS: Skin color was normal.  Number of ebenezer, he has a red raised lesion on his back with a small ulceration.  Hair and nails were normal.  NEUROLOGIC: The patient was alert and oriented to person, place, time, and circumstance. Speech was normal. Cranial nerves were normal. Motor strength was normal for age. The patient was normally coordinated.  PSYCHIATRIC:  Mood and affect were normal and the patient had normal recent and remote memory. The patient's judgment and insight were normal.       Additional Information        Archie Mosher MD  Internal Medicine  Contact me at [phone]

## 2021-06-27 NOTE — PROGRESS NOTES
Progress Notes by Shayan Valentin PA-C at 1/14/2019 12:50 PM     Author: Shayan Valentin PA-C Service: -- Author Type: Physician Assistant    Filed: 1/14/2019  3:27 PM Encounter Date: 1/14/2019 Status: Signed    : Shayan Valentin PA-C (Physician Assistant)       Subjective:      Patient ID: Juanito Delacruz is a 53 y.o. male.    Chief Complaint:    HPI  Juanito Delacruz is a 53 y.o. male who presents today complaining of continued cough and viral illness since early December.  Patient was [facility] on early December at the [facility] for a headache and sinus problem.  He had a CT scan as well as placed on antibiotic at that time.  He had some improvement of his cough but his cough has been persisting for 6 weeks.  Patient is now having difficulty with 4-day worsening history of cough and congestion the cough is productive of off colored yellow phlegm.  Feels that he is a slightly short of breath when he and when he lays down he has wheezing.  He has tried over-the-counter Cepacol with dextromethorphan and Robitussin without relief.  At this time the patient is not stating that he has chills night sweats pleuritic chest pain cardiac type symptoms to include diaphoresis nausea chills or night sweats.  Patient is a non-smoker.  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  He has sick contacts with the patient population and he has had a seasonal influenza immunization.      No past medical history on file.    No past surgical history on file.    No family history on file.    Social History     Tobacco Use   ? Smoking status: Never Smoker   ? Smokeless tobacco: Never Used   Substance Use Topics   ? Alcohol use: Not on file   ? Drug use: Not on file       Review of Systems  As above in HPI, otherwise balance of Review of Systems are negative.    Objective:     /86 (Patient Site: Right Arm, Patient Position: Sitting, Cuff Size: Adult Large)   Pulse 87   Temp 98.5  F (36.9  C) (Oral)   Wt  (!) 287 lb 8 oz (130.4 kg)   SpO2 97%   BMI 40.10 kg/m      Physical Exam  General: Patient is resting comfortably no acute distress is afebrile  HEENT: Head is normocephalic atraumatic   eyes are PERRL EOMI sclera anicteric   TMs are clear bilaterally  Throat is clear  No cervical lymphadenopathy present  LUNGS: No longer expiratory wheezes in the mid to lower bilateral lung fields.  HEART: Regular rate and rhythm  Skin: Without rash non-diaphoretic      Assessment:     Procedures    The encounter diagnosis was Cough.    Plan:     1. Cough  doxycycline (MONODOX) 100 MG capsule    cefdinir (OMNICEF) 300 MG capsule    albuterol (PROAIR HFA;PROVENTIL HFA;VENTOLIN HFA) 90 mcg/actuation inhaler    predniSONE (DELTASONE) 20 MG tablet       We will treat with double coverage for a presumed pneumonia since the patient has had a long-standing history of 4-6 weeks of cough and had back to back viral illnesses and now congestion.  Will follow up with his primary care provider if he is not get 100% resolution or if new symptoms or concerns arise.    Patient Instructions   You were seen today for a cough.     1. Drink plenty of non-caffeinated fluids  2. Avoid smoke exposure  3. May use DETROMETHORPHAN to help suppress the cough in addition to the albuterol  4. May use tylenol or ibuprofen for discomfort    If symptoms have not improved in 5 days, follow-up with your primary care provider.    Reasons to return for re-evaluation:  - Fever 100.4 or higher  - Cough changes, coughing blood, coughing up more phlegm  - Not able to keep down fluids    Other things to try:  - Honey  - Efraín's VaporRub  - Albuerol      Patient Education     Pneumonia (Adult)  Pneumonia is an infection deep within the lungs. It is in the small air sacs (alveoli). Pneumonia may be caused by a virus or bacteria. Pneumonia caused by bacteria is usually treated with an antibiotic. Severe cases may need to be treated in the hospital. Milder cases can be  treated at home. Symptoms usually start to get better during the first 2 days of treatment.    Home care  Follow these guidelines when caring for yourself at home:    Rest at home for the first 2 to 3 days, or until you feel stronger. Dont let yourself get overly tired when you go back to your activities.    Stay away from cigarette smoke - yours or other peoples.    You may use acetaminophen or ibuprofen to control fever or pain, unless another medicine was prescribed. If you have chronic liver or kidney disease, talk with your healthcare provider before using these medicines. Also talk with your provider if youve had a stomach ulcer or gastrointestinal bleeding. Dont give aspirin to anyone younger than 18 years of age who is ill with a fever. It may cause severe liver damage.    Your appetite may be poor, so a light diet is fine.    Drink 6 to 8 glasses of fluids every day to make sure you are getting enough fluids. Beverages can include water, sport drinks, sodas without caffeine, juices, tea, or soup. Fluids will help loosen secretions in the lung. This will make it easier for you to cough up the phlegm (sputum). If you also have heart or kidney disease, check with your healthcare provider before you drink extra fluids.    Take antibiotic medicine prescribed until it is all gone, even if you are feeling better after a few days.  Follow-up care  Follow up with your healthcare provider in the next 2 to 3 days, or as advised. This is to be sure the medicine is helping you get better.  If you are 65 or older, you should get a pneumococcal vaccine and a yearly flu (influenza) shot. You should also get these vaccines if you have chronic lung disease like asthma, emphysema, or COPD. Recently, a second type of pneumonia vaccine has become available for everyone over 65 years old. This is in addition to the previous vaccine. Ask your provider about this.  When to seek medical advice  Call your healthcare provider right  away if any of these occur:    You dont get better within the first 48 hours of treatment    Shortness of breath gets worse    Rapid breathing (more than 25 breaths per minute)    Coughing up blood    Chest pain gets worse with breathing    Fever of 100.4 F (38 C) or higher that doesnt get better with fever medicine    Weakness, dizziness, or fainting that gets worse    Thirst or dry mouth that gets worse    Sinus pain, headache, or a stiff neck    Chest pain not caused by coughing  Date Last Reviewed: 1/1/2017 2000-2017 [employer]. [address] [place] [id number]. All rights reserved. This information is not intended as a substitute for professional medical care. Always follow your healthcare professional's instructions.

## 2021-07-06 VITALS
OXYGEN SATURATION: 99 % | HEIGHT: 71 IN | BODY MASS INDEX: 39.97 KG/M2 | SYSTOLIC BLOOD PRESSURE: 124 MMHG | HEART RATE: 66 BPM | DIASTOLIC BLOOD PRESSURE: 86 MMHG | WEIGHT: 285.5 LBS

## 2022-04-27 ENCOUNTER — TELEPHONE (OUTPATIENT)
Dept: DERMATOLOGY | Facility: CLINIC | Age: 57
End: 2022-04-27
Payer: COMMERCIAL

## 2022-04-27 NOTE — TELEPHONE ENCOUNTER
[facility]    Phone Message    May a detailed message be left on voicemail: yes     Reason for Call: Symptoms or Concerns     If patient has red-flag symptoms, warm transfer to triage line    Current symptom or concern: Protruding growth on back that bleeds when touched.    Symptoms have been present for:  A few weeks for the bleeding, but the growth has been there for close to a year.     Has patient previously been seen for this? No    By : NA    Date: NA    Are there any new or worsening symptoms? Yes: See above, Pt  Is scheduled for 8/3/22 and is on the wait list.       Action Taken: Other: [place] [facility]    Travel Screening: Not Applicable

## 2022-04-28 NOTE — TELEPHONE ENCOUNTER
"Spoke to patient and he stated:     \"I didn't ask for a sooner appointment, but I am kevyn worried about this spot-it's been thee for a year already..\"     I advised he could check with other [facility] clinics to see if they have any sooner appts available as we are booking into August and have > 600 patients on the waiting list and did have to reschedule 70+ patients this week due to an unexpected Provider absence from clinic.     He can also check with his insurance company to see who is covered for Derm.     Patient verbalized understanding. Kaye Maloney RN      "

## 2022-06-16 ENCOUNTER — TRANSFERRED RECORDS (OUTPATIENT)
Dept: HEALTH INFORMATION MANAGEMENT | Facility: CLINIC | Age: 57
End: 2022-06-16

## 2022-06-22 ENCOUNTER — OFFICE VISIT (OUTPATIENT)
Dept: OTOLARYNGOLOGY | Facility: CLINIC | Age: 57
End: 2022-06-22
Payer: COMMERCIAL

## 2022-06-22 DIAGNOSIS — K21.9 LARYNGOPHARYNGEAL REFLUX (LPR): ICD-10-CM

## 2022-06-22 DIAGNOSIS — J37.0 CHRONIC LARYNGITIS: ICD-10-CM

## 2022-06-22 DIAGNOSIS — K11.8 PAROTID MASS: Primary | ICD-10-CM

## 2022-06-22 PROCEDURE — 99203 OFFICE O/P NEW LOW 30 MIN: CPT | Mod: 25 | Performed by: OTOLARYNGOLOGY

## 2022-06-22 PROCEDURE — 31575 DIAGNOSTIC LARYNGOSCOPY: CPT | Performed by: OTOLARYNGOLOGY

## 2022-06-22 NOTE — LETTER
6/22/2022         RE: Juanito Delacruz  [address]  [facility] [address]        Dear Colleague,    Thank you for referring your patient, Juanito Delacruz, to the [facility]. Please see a copy of my visit note below.    HPI: This patient is a 55yo M who presents to clinic for evaluation of a neck mass. It has been present on the left side for several months. The patient has noticed fluctuations in size and only discomfort when it is distended. 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 Also has globus sensation and PND. Gained a lot of weight recently. Has not tried any reflux medications. Denies fevers, unintentional weight loss, odynophagia, dysphagia, hemoptysis, voice changes, and shortness of breath.     Past medical history, surgical history, social history, family history, medications, and allergies have been reviewed with the patient and are documented above.    Review of Systems: a 10-system review was performed. Pertinent positives are noted in the HPI and on a separate scanned document in the chart.    PHYSICAL EXAMINATION:  GEN: no acute distress, normocephalic. Obese.  EYES: extraocular movements are intact, pupils are equal and round. Sclera clear.   EARS: auricles are normally formed. The external auditory canals are clear with minimal to no cerumen. Tympanic membranes are intact bilaterally with no signs of infection, effusion, retractions, or perforations.  NOSE: anterior nares are patent. There are no masses or lesions. The septum is non-obstructing.  OC/OP: clear, dentition is in good repair. The tongue and palate are fully mobile and symmetric. The floor of mouth, base of tongue, and tonsils are soft and symmetric.  HP/L (scope): nasopharynx, base of tongue, vallecula, epiglottis, and pyriform sinuses are clear. The bilateral vocal folds are mobile and without lesion. Interaryteniod  pachydermia and hyperemia of the laryngeal surface of the epiglottis c/w LPR  NECK: soft and supple. No lymphadenopathy. Airway is midline. Mobile 2cm mass in the right parotid tail.  NEURO: CN VII and XII symmetric. alert and oriented. No spontaneous nystagmus. Gait is normal.  PULM: breathing comfortably on room air, normal chest expansion with respiration  CARDS: no cyanosis or clubbing, normal carotid pulses    FLEXIBLE LARYNGOSCOPY: Scope inserted bilaterally to examine nasal tissue, nasopharynx, posterior oropharynx, hypopharynx, and larynx. See exam notes for exam finding details. Patient tolerated the procedure well.    MEDICAL DECISION-MAKING: This patient is a 57yo M with a 2 issues.   1. Right parotid mass. Will need imaging to determine the nature of this and its location within or abutting the gland. Will call with the results of the scan and a recommendation.   2. Chronic laryngitis/globus sensation from acid reflux. Discussed diet and lifestyle changes, including weight loss, in addition to risks and benefits of H2 blocker vs PPI therapy. Can follow-up with PCP or GI for further management moving forward.          Again, thank you for allowing me to participate in the care of your patient.        Sincerely,        Sagrario Bowers MD

## 2022-06-22 NOTE — PROGRESS NOTES
HPI: This patient is a 55yo M who presents to clinic for evaluation of a neck mass. It has been present on the left side for several months. The patient has noticed fluctuations in size and only discomfort when it is distended. 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 Also has globus sensation and PND. Gained a lot of weight recently. Has not tried any reflux medications. Denies fevers, unintentional weight loss, odynophagia, dysphagia, hemoptysis, voice changes, and shortness of breath.     Past medical history, surgical history, social history, family history, medications, and allergies have been reviewed with the patient and are documented above.    Review of Systems: a 10-system review was performed. Pertinent positives are noted in the HPI and on a separate scanned document in the chart.    PHYSICAL EXAMINATION:  GEN: no acute distress, normocephalic. Obese.  EYES: extraocular movements are intact, pupils are equal and round. Sclera clear.   EARS: auricles are normally formed. The external auditory canals are clear with minimal to no cerumen. Tympanic membranes are intact bilaterally with no signs of infection, effusion, retractions, or perforations.  NOSE: anterior nares are patent. There are no masses or lesions. The septum is non-obstructing.  OC/OP: clear, dentition is in good repair. The tongue and palate are fully mobile and symmetric. The floor of mouth, base of tongue, and tonsils are soft and symmetric.  HP/L (scope): nasopharynx, base of tongue, vallecula, epiglottis, and pyriform sinuses are clear. The bilateral vocal folds are mobile and without lesion. Interaryteniod pachydermia and hyperemia of the laryngeal surface of the epiglottis c/w LPR  NECK: soft and supple. No lymphadenopathy. Airway is midline. Mobile 2cm mass in the right parotid tail.  NEURO: CN VII and XII symmetric. alert and oriented. No spontaneous  nystagmus. Gait is normal.  PULM: breathing comfortably on room air, normal chest expansion with respiration  CARDS: no cyanosis or clubbing, normal carotid pulses    FLEXIBLE LARYNGOSCOPY: Scope inserted bilaterally to examine nasal tissue, nasopharynx, posterior oropharynx, hypopharynx, and larynx. See exam notes for exam finding details. Patient tolerated the procedure well.    MEDICAL DECISION-MAKING: This patient is a 55yo M with a 2 issues.   1. Right parotid mass. Will need imaging to determine the nature of this and its location within or abutting the gland. Will call with the results of the scan and a recommendation.   2. Chronic laryngitis/globus sensation from acid reflux. Discussed diet and lifestyle changes, including weight loss, in addition to risks and benefits of H2 blocker vs PPI therapy. Can follow-up with PCP or GI for further management moving forward.

## 2022-08-14 ENCOUNTER — HEALTH MAINTENANCE LETTER (OUTPATIENT)
Age: 57
End: 2022-08-14

## 2022-09-28 ENCOUNTER — OFFICE VISIT (OUTPATIENT)
Dept: INTERNAL MEDICINE | Facility: CLINIC | Age: 57
End: 2022-09-28
Payer: COMMERCIAL

## 2022-09-28 VITALS
SYSTOLIC BLOOD PRESSURE: 128 MMHG | OXYGEN SATURATION: 98 % | HEART RATE: 71 BPM | DIASTOLIC BLOOD PRESSURE: 80 MMHG | RESPIRATION RATE: 18 BRPM | BODY MASS INDEX: 41.16 KG/M2 | WEIGHT: 291 LBS

## 2022-09-28 DIAGNOSIS — K76.0 FATTY LIVER: ICD-10-CM

## 2022-09-28 DIAGNOSIS — Z91.89 INTERMEDIATE RISK FOR CORONARY ARTERY DISEASE: ICD-10-CM

## 2022-09-28 DIAGNOSIS — R73.01 IMPAIRED FASTING GLUCOSE: ICD-10-CM

## 2022-09-28 DIAGNOSIS — G47.33 OSA ON CPAP: ICD-10-CM

## 2022-09-28 DIAGNOSIS — Z13.1 SCREENING FOR DIABETES MELLITUS: ICD-10-CM

## 2022-09-28 DIAGNOSIS — E66.01 MORBID OBESITY (H): ICD-10-CM

## 2022-09-28 DIAGNOSIS — Z12.5 SCREENING FOR PROSTATE CANCER: ICD-10-CM

## 2022-09-28 DIAGNOSIS — Z00.00 ANNUAL PHYSICAL EXAM: Primary | ICD-10-CM

## 2022-09-28 DIAGNOSIS — Z86.0100 HISTORY OF COLONIC POLYPS: ICD-10-CM

## 2022-09-28 DIAGNOSIS — Z13.220 SCREENING FOR HYPERLIPIDEMIA: ICD-10-CM

## 2022-09-28 LAB
ALBUMIN SERPL BCG-MCNC: 4.5 G/DL (ref 3.5–5.2)
ALBUMIN UR-MCNC: NEGATIVE MG/DL
ALP SERPL-CCNC: 50 U/L (ref 40–129)
ALT SERPL W P-5'-P-CCNC: 54 U/L (ref 10–50)
ANION GAP SERPL CALCULATED.3IONS-SCNC: 13 MMOL/L (ref 7–15)
APPEARANCE UR: CLEAR
AST SERPL W P-5'-P-CCNC: 42 U/L (ref 10–50)
BILIRUB SERPL-MCNC: 0.6 MG/DL
BILIRUB UR QL STRIP: NEGATIVE
BUN SERPL-MCNC: 16.9 MG/DL (ref 6–20)
CALCIUM SERPL-MCNC: 9.1 MG/DL (ref 8.6–10)
CHLORIDE SERPL-SCNC: 103 MMOL/L (ref 98–107)
CHOLEST SERPL-MCNC: 190 MG/DL
COLOR UR AUTO: YELLOW
CREAT SERPL-MCNC: 1.11 MG/DL (ref 0.67–1.17)
DEPRECATED HCO3 PLAS-SCNC: 24 MMOL/L (ref 22–29)
ERYTHROCYTE [DISTWIDTH] IN BLOOD BY AUTOMATED COUNT: 12.7 % (ref 10–15)
GFR SERPL CREATININE-BSD FRML MDRD: 77 ML/MIN/1.73M2
GLUCOSE SERPL-MCNC: 138 MG/DL (ref 70–99)
GLUCOSE UR STRIP-MCNC: NEGATIVE MG/DL
HBA1C MFR BLD: 6.4 % (ref 0–5.6)
HCT VFR BLD AUTO: 43 % (ref 40–53)
HDLC SERPL-MCNC: 35 MG/DL
HGB BLD-MCNC: 14.8 G/DL (ref 13.3–17.7)
HGB UR QL STRIP: NEGATIVE
KETONES UR STRIP-MCNC: NEGATIVE MG/DL
LDLC SERPL CALC-MCNC: 124 MG/DL
LEUKOCYTE ESTERASE UR QL STRIP: NEGATIVE
MCH RBC QN AUTO: 30.8 PG (ref 26.5–33)
MCHC RBC AUTO-ENTMCNC: 34.4 G/DL (ref 31.5–36.5)
MCV RBC AUTO: 90 FL (ref 78–100)
NITRATE UR QL: NEGATIVE
NONHDLC SERPL-MCNC: 155 MG/DL
PH UR STRIP: 5.5 [PH] (ref 5–8)
PLATELET # BLD AUTO: 164 10E3/UL (ref 150–450)
POTASSIUM SERPL-SCNC: 4.4 MMOL/L (ref 3.4–5.3)
PROT SERPL-MCNC: 7.1 G/DL (ref 6.4–8.3)
PSA SERPL-MCNC: 0.62 NG/ML (ref 0–3.5)
RBC # BLD AUTO: 4.8 10E6/UL (ref 4.4–5.9)
SODIUM SERPL-SCNC: 140 MMOL/L (ref 136–145)
SP GR UR STRIP: 1.02 (ref 1–1.03)
TRIGL SERPL-MCNC: 156 MG/DL
TSH SERPL DL<=0.005 MIU/L-ACNC: 1.02 UIU/ML (ref 0.3–4.2)
UROBILINOGEN UR STRIP-ACNC: 0.2 E.U./DL
WBC # BLD AUTO: 6.1 10E3/UL (ref 4–11)

## 2022-09-28 PROCEDURE — 83036 HEMOGLOBIN GLYCOSYLATED A1C: CPT | Performed by: INTERNAL MEDICINE

## 2022-09-28 PROCEDURE — 84403 ASSAY OF TOTAL TESTOSTERONE: CPT | Performed by: INTERNAL MEDICINE

## 2022-09-28 PROCEDURE — 80053 COMPREHEN METABOLIC PANEL: CPT | Performed by: INTERNAL MEDICINE

## 2022-09-28 PROCEDURE — 84443 ASSAY THYROID STIM HORMONE: CPT | Performed by: INTERNAL MEDICINE

## 2022-09-28 PROCEDURE — 99396 PREV VISIT EST AGE 40-64: CPT | Performed by: INTERNAL MEDICINE

## 2022-09-28 PROCEDURE — 80061 LIPID PANEL: CPT | Performed by: INTERNAL MEDICINE

## 2022-09-28 PROCEDURE — G0103 PSA SCREENING: HCPCS | Performed by: INTERNAL MEDICINE

## 2022-09-28 PROCEDURE — 85027 COMPLETE CBC AUTOMATED: CPT | Performed by: INTERNAL MEDICINE

## 2022-09-28 PROCEDURE — 81003 URINALYSIS AUTO W/O SCOPE: CPT | Performed by: INTERNAL MEDICINE

## 2022-09-28 PROCEDURE — 36415 COLL VENOUS BLD VENIPUNCTURE: CPT | Performed by: INTERNAL MEDICINE

## 2022-09-28 ASSESSMENT — ENCOUNTER SYMPTOMS
FEVER: 0
FREQUENCY: 0
NAUSEA: 0
PARESTHESIAS: 0
EYE PAIN: 0
HEMATOCHEZIA: 0
SHORTNESS OF BREATH: 0
NERVOUS/ANXIOUS: 1
HEMATURIA: 0
CHILLS: 0
ABDOMINAL PAIN: 0
DIZZINESS: 0
MYALGIAS: 0
PALPITATIONS: 0
CONSTIPATION: 0
HEADACHES: 0
WEAKNESS: 0
DIARRHEA: 0
ARTHRALGIAS: 1
SORE THROAT: 0
DYSURIA: 0
JOINT SWELLING: 0
HEARTBURN: 0
COUGH: 0

## 2022-09-28 ASSESSMENT — PAIN SCALES - GENERAL: PAINLEVEL: NO PAIN (0)

## 2022-09-28 NOTE — PROGRESS NOTES
Office Visit - Physical   Juanito Delacruz   57 year old  male    Date of visit: 9/28/2022  Physician: Archie Mosher MD     Assessment and Plan   1. Annual physical exam  This is a 57-year-old man with issues as discussed below    2. Impaired fasting glucose  - TSH with free T4 reflex; Future  - UA reflex to Microscopic and Culture; Future  - TSH with free T4 reflex  - UA reflex to Microscopic and Culture    3. Screening for diabetes mellitus  - Hemoglobin A1c; Future  - Hemoglobin A1c    4. Screening for hyperlipidemia  - Lipid panel reflex to direct LDL Fasting; Future  - Lipid panel reflex to direct LDL Fasting    5. Screening for prostate cancer  - Prostate Specific Antigen Screen; Future  - Prostate Specific Antigen Screen    6. JUAN on CPAP    7. History of colonic polyps  Follow-up per guidelines/recommendation    8. Fatty liver  As below  - CBC with platelets; Future  - Comprehensive metabolic panel; Future  - CBC with platelets  - Comprehensive metabolic panel    9. Intermediate risk for coronary artery disease  - CT Coronary Calcium Scan; Future    10. Morbid obesity (H)  - Testosterone, total; Future  - Testosterone, total  The following high BMI interventions were performed this visit: encouragement to exercise and lifestyle education regarding diet    Return in about 6 months (around 3/28/2023) for Follow up.     Chief Complaint   Chief Complaint   Patient presents with     Recheck Medication     Physical     Annual physical and is fasting for labs        Patient Profile   Social History     Social History Narrative    Lives with his wife, Dara.  Son Ted (2002) and Sophie (2004).  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          Past Medical History   Patient Active Problem List   Diagnosis     Chronic suppurative otitis media     Cervicalgia     Lumbago     Fatty liver     History of colonic polyps     Impaired fasting glucose     Morbid obesity (H)      JUAN on CPAP       Past Surgical History  He has a past surgical history that includes No Past Surgeries.     History of Present Illness   This 57 year old man comes in for annual physical.  Overall he is doing well.  He is gained a little bit of weight.  He has had COVID twice and some residual slight breathing difficulty when wearing a mask.  No chest pain.  He does have sleep apnea and uses CPAP.  He has had some impaired fasting blood sugars.  I have asked him to start a statin last visit but he has not continued on this.  His cholesterol actually looks pretty good.    Review of Systems: A comprehensive review of systems was negative except as noted.     Medications and Allergies   No current outpatient medications on file.     No Known Allergies     Family and Social History   Family History   Problem Relation Age of Onset     Pancreatic Cancer Mother         islet cell     Diabetes Father      Neuropathy Father      No Known Problems Sister      Obesity Brother      No Known Problems Brother         Social History     Tobacco Use     Smoking status: Never Smoker     Smokeless tobacco: Never Used   Vaping Use     Vaping Use: Never used   Substance Use Topics     Alcohol use: Yes     Comment: Alcoholic Drinks/day: rare     Drug use: Never        Physical Exam   General Appearance:   No acute distress    /80   Pulse 71   Resp 18   Wt 132 kg (291 lb)   SpO2 98%   BMI 41.16 kg/m      EYES: Eyelids, conjunctiva, and sclera were normal. Pupils were normal. Cornea, iris, and lens were normal bilaterally.  HEAD, EARS, NOSE, MOUTH, AND THROAT: Head and face were normal. Hearing was normal to voice and the ears were normal to external exam.  NECK: Neck appearance was normal. There were no neck masses and the thyroid was not enlarged.  RESPIRATORY: Breathing pattern was normal and the chest moved symmetrically.  Percussion/auscultatory percussion was normal.  Lung sounds were normal and there were no  abnormal sounds.  CARDIOVASCULAR: Heart rate and rhythm were normal.  S1 and S2 were normal and there were no extra sounds or murmurs. Peripheral pulses in arms and legs were normal.  Jugular venous pressure was normal.  There was no peripheral edema.  GASTROINTESTINAL: The abdomen was normal in contour.  Bowel sounds were present.  Percussion detected no organ enlargement or tenderness.  Palpation detected no tenderness, mass, or enlarged organs.   MUSCULOSKELETAL: Skeletal configuration was normal and muscle mass was normal for age. Joint appearance was overall normal.  LYMPHATIC: There were no enlarged nodes.  SKIN/HAIR/NAILS: Skin color was normal.  There were no skin lesions.  Hair and nails were normal.  NEUROLOGIC: The patient was alert and oriented to person, place, time, and circumstance. Speech was normal. Cranial nerves were normal. Motor strength was normal for age. The patient was normally coordinated.  PSYCHIATRIC:  Mood and affect were normal and the patient had normal recent and remote memory. The patient's judgment and insight were normal.       Additional Information        Archie Mosher MD  Internal Medicine  Contact me at [phone]  Answers for HPI/ROS submitted by the patient on 9/28/2022  Frequency of exercise:: 2-3 days/week  Getting at least 3 servings of Calcium per day:: Yes  Diet:: Regular (no restrictions)  Taking medications regularly:: Not Applicable  Medication side effects:: Not applicable  Bi-annual eye exam:: Yes  Dental care twice a year:: Yes  Sleep apnea or symptoms of sleep apnea:: Sleep apnea  abdominal pain: No  Blood in stool: No  Blood in urine: No  chest pain: No  chills: No  congestion: No  constipation: No  cough: No  diarrhea: No  dizziness: No  ear pain: No  eye pain: No  nervous/anxious: Yes  fever: No  frequency: No  genital sores: No  headaches: No  hearing loss: No  heartburn: No  arthralgias: Yes  joint swelling: No  peripheral edema: No  mood changes:  No  myalgias: No  nausea: No  dysuria: No  palpitations: No  Skin sensation changes: No  sore throat: No  urgency: Yes  rash: No  shortness of breath: No  visual disturbance: Yes  weakness: No  impotence: No  penile discharge: No  Additional concerns today:: No  Duration of exercise:: Less than 15 minutes

## 2022-09-30 LAB — TESTOST SERPL-MCNC: 214 NG/DL (ref 240–950)

## 2022-11-04 ENCOUNTER — TRANSFERRED RECORDS (OUTPATIENT)
Dept: HEALTH INFORMATION MANAGEMENT | Facility: CLINIC | Age: 57
End: 2022-11-04

## 2022-11-19 ENCOUNTER — HEALTH MAINTENANCE LETTER (OUTPATIENT)
Age: 57
End: 2022-11-19

## 2023-01-27 ENCOUNTER — TELEPHONE (OUTPATIENT)
Dept: INTERNAL MEDICINE | Facility: CLINIC | Age: 58
End: 2023-01-27
Payer: COMMERCIAL

## 2023-01-27 DIAGNOSIS — E29.1 HYPOGONADISM MALE: Primary | ICD-10-CM

## 2023-01-27 NOTE — TELEPHONE ENCOUNTER
Or is there anything that Dr Mosher can recommend instead or some pre dm medication?      Order/Referral Request   Who is requesting: Patient per Dr Mosher    Orders being requested: Endocrinologist    Reason service is needed/diagnosis: pre dm and low testosterone  When are orders needed by: asap    Has this been discussed with Provider: Yes per lab note from 9/28/22-In regards to your low testosterone, you may be a candidate for testosterone therapy and I would want you to see an endocrinologist to discuss this.  I would be happy to place a referral if you would like. I       Does patient have a preference on a Group/Provider/Facility? [facility] or other endo    Does patient have an appointment scheduled?: No    Where to send orders: Epic     Could we send this information to you in [place] or would you prefer to receive a phone call?:   Patient would prefer a phone call   Okay to leave a detailed message?: Yes at Cell number on file:    Telephone Information:   Mobile [phone]

## 2023-02-22 ENCOUNTER — MYC MEDICAL ADVICE (OUTPATIENT)
Dept: INTERNAL MEDICINE | Facility: CLINIC | Age: 58
End: 2023-02-22
Payer: COMMERCIAL

## 2023-02-22 ENCOUNTER — NURSE TRIAGE (OUTPATIENT)
Dept: INTERNAL MEDICINE | Facility: CLINIC | Age: 58
End: 2023-02-22
Payer: COMMERCIAL

## 2023-02-22 DIAGNOSIS — R05.1 ACUTE COUGH: Primary | ICD-10-CM

## 2023-02-22 DIAGNOSIS — U07.1 INFECTION DUE TO 2019 NOVEL CORONAVIRUS: Primary | ICD-10-CM

## 2023-02-22 RX ORDER — ALBUTEROL SULFATE 90 UG/1
2 AEROSOL, METERED RESPIRATORY (INHALATION) EVERY 6 HOURS PRN
Qty: 18 G | Refills: 0 | Status: SHIPPED | OUTPATIENT
Start: 2023-02-22 | End: 2024-04-05

## 2023-02-22 NOTE — TELEPHONE ENCOUNTER
See triage encounter from  for Covid treatment    Patient also requesting something to help with his cough. States he was also wheezing at night which was bothersome. States he has had an albuterol inhaler in the past but the one he has at home is  and wonders if he can get a new prescription.

## 2023-02-22 NOTE — TELEPHONE ENCOUNTER
RN COVID TREATMENT VISIT  02/22/23      The patient has been triaged and does not require a higher level of care.    Juanito Delacruz  57 year old  Current weight? 291 lbs    Has the patient been seen by a primary care provider at an [facility] or [facility] within the past two years? Yes.   Have you been in close proximity to/do you have a known exposure to a person with a confirmed case of influenza? No.     General treatment eligibility:  Date of positive COVID test (PCR or at home)?  2/22/23    Are you or have you been hospitalized for this COVID-19 infection? No.   Have you received monoclonal antibodies or antiviral treatment for COVID-19 since this positive test? No.   Do you have any of the following conditions that place you at risk of being very sick from COVID-19?   - Age 50 years or older  - Overweight or Obesity (BMI >85th percentile or BMI 25 or higher)  Yes, patient has at least one high risk condition as noted above.     Current COVID symptoms:   - cough  - shortness of breath or difficulty breathing  - congestion or runny nose  Yes. Patient has at least one symptom as selected.     How many days since symptoms started? 5 days or less. Established patient, 12 years or older weighing at least 88.2 lbs, who has symptoms that started in the past 5 days, has not been hospitalized nor received treatment already, and is at risk for being very sick from COVID-19.     Treatment eligibility by RN:    Are you currently pregnant or nursing? No    Do you have a clinically significant hypersensitivity to nirmatrelvir or ritonavir, or toxic epidermal necrolysis (TEN) or Rivas-Antonio Syndrome? No    Do you have a history of hepatitis, any hepatic impairment on the Problem List (such as Child-Dyson Class C, cirrhosis, fatty liver disease, alcoholic liver disease), or was the last liver lab (hepatic panel, ALT, AST, ALK Phos, bilirubin) elevated in the past 6 months? No    Do you have any  history of severe renal impairment (eGFR < 30mL/min)? No    Is patient eligible to continue?   Yes, patient meets all eligibility requirements for the RN COVID treatment (as denoted by all no responses above).     No current outpatient medications on file.       Medications from List 1 of the standing order (on medications that exclude the use of Paxlovid) that patient is taking: NONE. Is patient taking Donna's Wort? No  Is patient taking Donna's Wort or any meds from List 1? No.   Medications from List 2 of the standing order (on meds that provider needs to adjust) that patient is taking: NONE. Is patient on any of the meds from List 2? No.   Medications from List 3 of standing order (on meds that a RN needs to adjust) that patient is taking: NONE. Is patient on any meds from List 3? No.     Paxlovid has an approximate 90% reduction in hospitalization. Paxlovid can possibly cause altered sense of taste, diarrhea (loose, watery stools), high blood pressure, muscle aches.     Would patient like a Paxlovid prescription?   Yes.   Lab Results   Component Value Date    GFRESTIMATED 77 09/28/2022       Was last eGFR reduced? No, eGFR 60 or greater/ No Result on record. Patient can receive the normal renal function dose. Paxlovid Rx sent to [facility] - [address]    Temporary change to home medications: None    All medication adjustments (holds, etc) were discussed with the patient and patient was asked to repeat back (teachback) their med adjustment.  Did patient understand med adjustment? No medication adjustments needed.         Reviewed the following instructions with the patient:    Paxlovid (nimatrelvir and ritonavir)    How it works  Two medicines (nirmatrelvir and ritonavir) are taken together. They stop the virus from growing. Less amount of virus is easier for your body to fight.    How to take    Medicine comes in a daily container with both medicine tablets.  Take by mouth twice daily (once in the morning, once at night) for 5 days.    The number of tablets to take varies by patient.    Don't chew or break capsules. Swallow whole.    When to take  Take as soon as possible after positive COVID-19 test result, and within 5 days of your first symptoms.    Possible side effects  Can cause altered sense of taste, diarrhea (loose, watery stools), high blood pressure, muscle aches.    Robert Granger RN                   Reason for Disposition    [1] HIGH RISK for severe COVID complications (e.g., weak immune system, age > 64 years, obesity with BMI of 30 or higher, pregnant, chronic lung disease or other chronic medical condition) AND [2] COVID symptoms (e.g., cough, fever)  (Exceptions: Already seen by PCP and no new or worsening symptoms.)    Additional Information    Negative: [1] Fever > 100.0 F (37.8 C) AND [2] bedridden (e.g., nursing home patient, CVA, chronic illness, recovering from surgery)    Negative: [1] Fever > 101 F (38.3 C) AND [2] over 60 years of age    Negative: Fever > 103 F (39.4 C)    Negative: MILD difficulty breathing (e.g., minimal/no SOB at rest, SOB with walking, pulse <100)    Negative: Chest pain or pressure  (Exception: MILD central chest pain, present only when coughing)    Negative: Patient sounds very sick or weak to the triager    Negative: SEVERE or constant chest pain or pressure  (Exception: Mild central chest pain, present only when coughing.)    Negative: MODERATE difficulty breathing (e.g., speaks in phrases, SOB even at rest, pulse 100-120)    Negative: Headache and stiff neck (can't touch chin to chest)    Negative: Oxygen level (e.g., pulse oximetry) 90 percent or lower    Negative: [1] Diagnosed or suspected COVID-19 AND [2] symptoms lasting 3 or more weeks    Negative: [1] COVID-19 exposure AND [2] no symptoms    Negative: COVID-19 vaccine reaction suspected (e.g., fever, headache, muscle aches) occurring 1 to 3 days after getting  "vaccine    Negative: COVID-19 vaccine, questions about    Negative: [1] Lives with someone known to have influenza (flu test positive) AND [2] flu-like symptoms (e.g., cough, runny nose, sore throat, SOB; with or without fever)    Negative: [1] Adult with possible COVID-19 symptoms AND [2] triager concerned about severity of symptoms or other causes    Negative: COVID-19 and breastfeeding, questions about    Negative: SEVERE difficulty breathing (e.g., struggling for each breath, speaks in single words)    Negative: Difficult to awaken or acting confused (e.g., disoriented, slurred speech)    Negative: Bluish (or gray) lips or face now    Negative: Shock suspected (e.g., cold/pale/clammy skin, too weak to stand, low BP, rapid pulse)    Negative: Sounds like a life-threatening emergency to the triager    Answer Assessment - Initial Assessment Questions  1. COVID-19 DIAGNOSIS: \"Who made your COVID-19 diagnosis?\" \"Was it confirmed by a positive lab test or self-test?\" If not diagnosed by a doctor (or NP/PA), ask \"Are there lots of cases (community spread) where you live?\" Note: See public health department website, if unsure.      2/22/23 at home  2. COVID-19 EXPOSURE: \"Was there any known exposure to COVID before the symptoms began?\" CDC Definition of close contact: within 6 feet (2 meters) for a total of 15 minutes or more over a 24-hour period.       unknown  3. ONSET: \"When did the COVID-19 symptoms start?\"       2/21/23  4. WORST SYMPTOM: \"What is your worst symptom?\" (e.g., cough, fever, shortness of breath, muscle aches)      Lungs burning and cough  5. COUGH: \"Do you have a cough?\" If Yes, ask: \"How bad is the cough?\"        Yes. abd sore from coughing   6. FEVER: \"Do you have a fever?\" If Yes, ask: \"What is your temperature, how was it measured, and when did it start?\"      denies  7. RESPIRATORY STATUS: \"Describe your breathing?\" (e.g., shortness of breath, wheezing, unable to speak)       Wheezing at night, " "but otherwise breathing ok  8. BETTER-SAME-WORSE: \"Are you getting better, staying the same or getting worse compared to yesterday?\"  If getting worse, ask, \"In what way?\"      worse  9. HIGH RISK DISEASE: \"Do you have any chronic medical problems?\" (e.g., asthma, heart or lung disease, weak immune system, obesity, etc.)      Overweight, low testosterone  10. VACCINE: \"Have you had the COVID-19 vaccine?\" If Yes, ask: \"Which one, how many shots, when did you get it?\"        Fully vaccinated. Pfizer  11. BOOSTER: \"Have you received your COVID-19 booster?\" If Yes, ask: \"Which one and when did you get it?\"        Yes  12. PREGNANCY: \"Is there any chance you are pregnant?\" \"When was your last menstrual period?\"        N/A  13. OTHER SYMPTOMS: \"Do you have any other symptoms?\"  (e.g., chills, fatigue, headache, loss of smell or taste, muscle pain, sore throat)        Fatigue, cough, wheezing at night, congestion,   14. O2 SATURATION MONITOR:  \"Do you use an oxygen saturation monitor (pulse oximeter) at home?\" If Yes, ask \"What is your reading (oxygen level) today?\" \"What is your usual oxygen saturation reading?\" (e.g., 95%)        Yes. Last reading 97%    Protocols used: CORONAVIRUS (COVID-19) DIAGNOSED OR [id number]      "

## 2023-03-19 ENCOUNTER — HOSPITAL ENCOUNTER (OUTPATIENT)
Dept: CT IMAGING | Facility: HOSPITAL | Age: 58
Discharge: HOME | End: 2023-03-19
Attending: OTOLARYNGOLOGY | Admitting: OTOLARYNGOLOGY
Payer: COMMERCIAL

## 2023-03-19 DIAGNOSIS — K11.8 PAROTID MASS: ICD-10-CM

## 2023-03-19 PROCEDURE — 250N000011 HC RX IP 250 OP 636: Performed by: OTOLARYNGOLOGY

## 2023-03-19 PROCEDURE — 70491 CT SOFT TISSUE NECK W/DYE: CPT

## 2023-03-19 RX ORDER — IOPAMIDOL 755 MG/ML
100 INJECTION, SOLUTION INTRAVASCULAR ONCE
Status: COMPLETED | OUTPATIENT
Start: 2023-03-19 | End: 2023-03-19

## 2023-03-19 RX ADMIN — IOPAMIDOL 100 ML: 755 INJECTION, SOLUTION INTRAVENOUS at 14:32

## 2023-03-20 ENCOUNTER — TELEPHONE (OUTPATIENT)
Dept: OTOLARYNGOLOGY | Facility: CLINIC | Age: 58
End: 2023-03-20
Payer: COMMERCIAL

## 2023-03-20 DIAGNOSIS — K11.8 PAROTID MASS: Primary | ICD-10-CM

## 2023-03-20 NOTE — TELEPHONE ENCOUNTER
Spoke with Ken with regards to his CT scan. The differential is still broad on this one, so he will benefit from an FNA. Will call him with the results of that when available.

## 2023-03-21 ENCOUNTER — HOSPITAL ENCOUNTER (OUTPATIENT)
Dept: ULTRASOUND IMAGING | Facility: HOSPITAL | Age: 58
Discharge: HOME | End: 2023-03-21
Attending: OTOLARYNGOLOGY | Admitting: RADIOLOGY
Payer: COMMERCIAL

## 2023-03-21 DIAGNOSIS — K11.8 PAROTID MASS: ICD-10-CM

## 2023-03-21 PROCEDURE — 88305 TISSUE EXAM BY PATHOLOGIST: CPT | Mod: TC | Performed by: OTOLARYNGOLOGY

## 2023-03-21 PROCEDURE — 88173 CYTOPATH EVAL FNA REPORT: CPT | Mod: TC | Performed by: OTOLARYNGOLOGY

## 2023-03-21 PROCEDURE — 272N000221 US BIOPSY PAROTID FINE NEEDLE ASPIRATION

## 2023-03-24 LAB

## 2023-03-24 PROCEDURE — 88172 CYTP DX EVAL FNA 1ST EA SITE: CPT | Mod: 26 | Performed by: PATHOLOGY

## 2023-03-24 PROCEDURE — 88173 CYTOPATH EVAL FNA REPORT: CPT | Mod: 26 | Performed by: PATHOLOGY

## 2023-03-24 PROCEDURE — 88305 TISSUE EXAM BY PATHOLOGIST: CPT | Mod: 26 | Performed by: PATHOLOGY

## 2023-03-28 ENCOUNTER — OFFICE VISIT (OUTPATIENT)
Dept: ENDOCRINOLOGY | Facility: CLINIC | Age: 58
End: 2023-03-28
Attending: INTERNAL MEDICINE
Payer: COMMERCIAL

## 2023-03-28 VITALS
WEIGHT: 295.9 LBS | OXYGEN SATURATION: 95 % | HEART RATE: 83 BPM | SYSTOLIC BLOOD PRESSURE: 156 MMHG | RESPIRATION RATE: 16 BRPM | BODY MASS INDEX: 41.43 KG/M2 | TEMPERATURE: 98.9 F | DIASTOLIC BLOOD PRESSURE: 94 MMHG | HEIGHT: 71 IN

## 2023-03-28 DIAGNOSIS — R73.01 IMPAIRED FASTING GLUCOSE: ICD-10-CM

## 2023-03-28 DIAGNOSIS — E29.1 HYPOGONADISM MALE: ICD-10-CM

## 2023-03-28 DIAGNOSIS — M13.0 POLYARTHRITIS: ICD-10-CM

## 2023-03-28 DIAGNOSIS — R53.83 OTHER FATIGUE: Primary | ICD-10-CM

## 2023-03-28 DIAGNOSIS — E11.9 TYPE 2 DIABETES MELLITUS WITHOUT COMPLICATION, WITHOUT LONG-TERM CURRENT USE OF INSULIN (H): ICD-10-CM

## 2023-03-28 LAB
ALBUMIN SERPL BCG-MCNC: 4.5 G/DL (ref 3.5–5.2)
ALP SERPL-CCNC: 55 U/L (ref 40–129)
ALT SERPL W P-5'-P-CCNC: 77 U/L (ref 10–50)
ANION GAP SERPL CALCULATED.3IONS-SCNC: 15 MMOL/L (ref 7–15)
AST SERPL W P-5'-P-CCNC: 44 U/L (ref 10–50)
BILIRUB SERPL-MCNC: 0.3 MG/DL
BUN SERPL-MCNC: 20.9 MG/DL (ref 6–20)
CALCIUM SERPL-MCNC: 9.5 MG/DL (ref 8.6–10)
CHLORIDE SERPL-SCNC: 105 MMOL/L (ref 98–107)
CREAT SERPL-MCNC: 1.11 MG/DL (ref 0.67–1.17)
DEPRECATED HCO3 PLAS-SCNC: 24 MMOL/L (ref 22–29)
ERYTHROCYTE [DISTWIDTH] IN BLOOD BY AUTOMATED COUNT: 13 % (ref 10–15)
ERYTHROCYTE [SEDIMENTATION RATE] IN BLOOD BY WESTERGREN METHOD: 7 MM/HR (ref 0–20)
FSH SERPL IRP2-ACNC: 2.9 MIU/ML (ref 1.5–12.4)
GFR SERPL CREATININE-BSD FRML MDRD: 77 ML/MIN/1.73M2
GLUCOSE SERPL-MCNC: 143 MG/DL (ref 70–99)
HBA1C MFR BLD: 7.6 % (ref 0–5.6)
HCT VFR BLD AUTO: 42.4 % (ref 40–53)
HGB BLD-MCNC: 14.9 G/DL (ref 13.3–17.7)
LH SERPL-ACNC: 4.5 MIU/ML (ref 1.7–8.6)
MCH RBC QN AUTO: 31.2 PG (ref 26.5–33)
MCHC RBC AUTO-ENTMCNC: 35.1 G/DL (ref 31.5–36.5)
MCV RBC AUTO: 89 FL (ref 78–100)
PLATELET # BLD AUTO: 153 10E3/UL (ref 150–450)
POTASSIUM SERPL-SCNC: 4.2 MMOL/L (ref 3.4–5.3)
PROLACTIN SERPL 3RD IS-MCNC: 5 NG/ML (ref 4–15)
PROT SERPL-MCNC: 7.2 G/DL (ref 6.4–8.3)
RBC # BLD AUTO: 4.77 10E6/UL (ref 4.4–5.9)
SODIUM SERPL-SCNC: 144 MMOL/L (ref 136–145)
WBC # BLD AUTO: 7.7 10E3/UL (ref 4–11)

## 2023-03-28 PROCEDURE — 83001 ASSAY OF GONADOTROPIN (FSH): CPT | Performed by: PHYSICIAN ASSISTANT

## 2023-03-28 PROCEDURE — 84403 ASSAY OF TOTAL TESTOSTERONE: CPT | Performed by: PHYSICIAN ASSISTANT

## 2023-03-28 PROCEDURE — 85027 COMPLETE CBC AUTOMATED: CPT | Performed by: PHYSICIAN ASSISTANT

## 2023-03-28 PROCEDURE — 82306 VITAMIN D 25 HYDROXY: CPT | Performed by: PHYSICIAN ASSISTANT

## 2023-03-28 PROCEDURE — 83036 HEMOGLOBIN GLYCOSYLATED A1C: CPT | Performed by: PHYSICIAN ASSISTANT

## 2023-03-28 PROCEDURE — 84270 ASSAY OF SEX HORMONE GLOBUL: CPT | Performed by: PHYSICIAN ASSISTANT

## 2023-03-28 PROCEDURE — 85652 RBC SED RATE AUTOMATED: CPT | Performed by: PHYSICIAN ASSISTANT

## 2023-03-28 PROCEDURE — 80053 COMPREHEN METABOLIC PANEL: CPT | Performed by: PHYSICIAN ASSISTANT

## 2023-03-28 PROCEDURE — 36415 COLL VENOUS BLD VENIPUNCTURE: CPT | Performed by: PHYSICIAN ASSISTANT

## 2023-03-28 PROCEDURE — 84146 ASSAY OF PROLACTIN: CPT | Performed by: PHYSICIAN ASSISTANT

## 2023-03-28 PROCEDURE — 99204 OFFICE O/P NEW MOD 45 MIN: CPT | Performed by: PHYSICIAN ASSISTANT

## 2023-03-28 PROCEDURE — 84305 ASSAY OF SOMATOMEDIN: CPT | Performed by: PHYSICIAN ASSISTANT

## 2023-03-28 PROCEDURE — 83002 ASSAY OF GONADOTROPIN (LH): CPT | Performed by: PHYSICIAN ASSISTANT

## 2023-03-28 RX ORDER — MULTIPLE VITAMINS W/ MINERALS TAB 9MG-400MCG
1 TAB ORAL DAILY
COMMUNITY
End: 2024-06-04

## 2023-03-28 NOTE — LETTER
3/28/2023         RE: Juanito Delacruz  [id number] [facility] [id number]        Dear Colleague,    Thank you for referring your patient, Juanito Delacruz, to the [facility]. Please see a copy of my visit note below.    Assessment/Plan :   1. Impaired fasting glucose. We discussed Ken's recent laboratory testing, at length. He has metabolic syndrome and that is contributing to his fatigue and low testosterone. We discussed possible options for treatment, as well. I would like to repeat a hemoglobin A1C today. If it has increased, I would recommend starting metformin or possibly a once weekly GLP-1.     2. Low Testosterone. We discussed the connection between testosterone and metabolic syndrome. We also reviewed the impact of pituitary dysfunction on testosterone. I am concerned about Ken's previous COVID infections and the possible impact on his pituitary function. We will repeat a testosterone level today along with a prolactin, FSH, LH, and IGF-1. I also gave him information regarding a nationwide study regarding COVID and the pituitary gland.     I would like to see Ken back in about 6 wks. I will contact him with the results and medication options. If he has any further questions, he can contact me through [employer].      I have independently reviewed and interpreted labs, imaging as indicated.      Chief complaint:  Juanito is a 57 year old male seen in consultation at the request of Dr. Mosher for progressive fatigue and low testosterone.    I have reviewed Care Everywhere including [facility], [facility], [facility], [place], [facility], [facility] , [facility], [place] lab reports, imaging reports and provider notes as indicated.      HISTORY OF PRESENT ILLNESS  Ken states that 2 to 3 yrs ago it felt like a switch was flipped. He experienced a significant decline in his energy, he started to gain weight, he had worsening brain fog, and he just didn't feel good. He  hadn't changed anything, he still ate about the same and he was fairly active but he couldn't seem to stop gaining weight or to get any energy back. He did have COVID three times in the last 3 yrs. His first COVID infection was right before the vaccines were available. He was really sick but not to the point of hospitalization. He feels like that may have been the trigger but he is not sure.    Recently, he had a routine physical with his PCP. He checked a variety of labs, including his testosterone. Ken was shocked to learn that his hemoglobin A1C was up, significantly. He was also told that his testosterone was low. He has noticed an increase in joint pain and muscle cramps along with the fatigue. He also recently had a significant change in his vision prescription.    Endocrine relevant labs are as follows:   Latest Reference Range & Units 09/28/22 07:52   Hemoglobin A1C 0.0 - 5.6 % 6.4 (H)   (H): Data is abnormally high     Latest Reference Range & Units 09/28/22 07:52   Testosterone Total 240 - 950 ng/dL 214 (L)   (L): Data is abnormally low    REVIEW OF SYSTEMS    Endocrine: positive for hyperglycemia and low testosterone  Skin: negative  Eyes: positive for visual blurring, glasses  Ears/Nose/Throat: negative  Respiratory: No shortness of breath, dyspnea on exertion, cough, or hemoptysis  Cardiovascular: negative for, chest pain and dyspnea on exertion  Gastrointestinal: negative for, nausea, vomiting, constipation and diarrhea  Genitourinary: negative for, nocturia, dysuria, frequency and urgency  Musculoskeletal: positive for arthritis, joint pain, joint stiffness, muscular weakness and nocturnal cramping  Neurologic: negative for, headaches, numbness or tingling of feet and tremor  Psychiatric: positive for depression  Hematologic/Lymphatic/Immunologic: negative    Past Medical History  Past Medical History:   Diagnosis Date     Cervicalgia     Non-specific Cervical Spine Pain     Lumbago     Mechanical Low  "Back Pain     Muscular wasting and disuse atrophy, not elsewhere classified     Deconditioning Syndrome     Unspecified chronic suppurative otitis media        Medications  Current Outpatient Medications   Medication Sig Dispense Refill     albuterol (PROAIR HFA/PROVENTIL HFA/VENTOLIN HFA) 108 (90 Base) MCG/ACT inhaler Inhale 2 puffs into the lungs every 6 hours as needed for shortness of breath, wheezing or cough 18 g 0     multivitamin w/minerals (MULTIVITAMIN, THERAPEUTIC WITH MINERALS) tablet Take 1 tablet by mouth daily         Allergies  No Known Allergies      Family History  family history includes Diabetes in his father; Neuropathy in his father; No Known Problems in his brother and sister; Obesity in his brother; Pancreatic Cancer in his mother.    Social History  Social History     Tobacco Use     Smoking status: Never     Smokeless tobacco: Never   Vaping Use     Vaping Use: Never used   Substance Use Topics     Alcohol use: Yes     Comment: Alcoholic Drinks/day: rare     Drug use: Never       Physical Exam  BP (!) 156/94 (BP Location: Left arm, Patient Position: Chair, Cuff Size: Adult Large)   Pulse 83   Temp 98.9  F (37.2  C) (Tympanic)   Resp 16   Ht 1.791 m (5' 10.51\")   Wt 134.2 kg (295 lb 14.4 oz)   SpO2 95%   BMI 41.84 kg/m    Body mass index is 41.84 kg/m .  GENERAL :  In no apparent distress. Pt seems a little depressed  SKIN: Normal color, normal temperature, texture.  No hirsutism, alopecia or purple striae.     EYES: PERRL, EOMI, No scleral icterus,  No proptosis, conjunctival redness, stare, retraction  NECK: No visible masses. No carotid bruits.   RESP: Lungs clear to auscultation bilaterally  CARDIAC: Regular rate and rhythm, normal S1 S2, without murmurs, rubs or gallops    NEURO: awake, alert, responds appropriately to questions.  Cranial nerves intact.   Moves all extremities; Gait normal.  No tremor of the outstretched hand.   EXTREMITIES: No clubbing, cyanosis or " edema.              Again, thank you for allowing me to participate in the care of your patient.        Sincerely,        Barbara Jimenes PA-C

## 2023-03-28 NOTE — PROGRESS NOTES
Assessment/Plan :   1. Impaired fasting glucose. We discussed Ken's recent laboratory testing, at length. He has metabolic syndrome and that is contributing to his fatigue and low testosterone. We discussed possible options for treatment, as well. I would like to repeat a hemoglobin A1C today. If it has increased, I would recommend starting metformin or possibly a once weekly GLP-1.     2. Low Testosterone. We discussed the connection between testosterone and metabolic syndrome. We also reviewed the impact of pituitary dysfunction on testosterone. I am concerned about Ken's previous COVID infections and the possible impact on his pituitary function. We will repeat a testosterone level today along with a prolactin, FSH, LH, and IGF-1. I also gave him information regarding a nationwide study regarding COVID and the pituitary gland.     I would like to see Ken back in about 6 wks. I will contact him with the results and medication options. If he has any further questions, he can contact me through [employer].      I have independently reviewed and interpreted labs, imaging as indicated.      Chief complaint:  Juanito is a 57 year old male seen in consultation at the request of Dr. Mosher for progressive fatigue and low testosterone.    I have reviewed Care Everywhere including [facility], [facility], [facility], [place], [facility], [facility] , [facility], [place] lab reports, imaging reports and provider notes as indicated.      HISTORY OF PRESENT ILLNESS  Ken states that 2 to 3 yrs ago it felt like a switch was flipped. He experienced a significant decline in his energy, he started to gain weight, he had worsening brain fog, and he just didn't feel good. He hadn't changed anything, he still ate about the same and he was fairly active but he couldn't seem to stop gaining weight or to get any energy back. He did have COVID three times in the last 3 yrs. His first COVID infection was right before the vaccines  were available. He was really sick but not to the point of hospitalization. He feels like that may have been the trigger but he is not sure.    Recently, he had a routine physical with his PCP. He checked a variety of labs, including his testosterone. Ken was shocked to learn that his hemoglobin A1C was up, significantly. He was also told that his testosterone was low. He has noticed an increase in joint pain and muscle cramps along with the fatigue. He also recently had a significant change in his vision prescription.    Endocrine relevant labs are as follows:   Latest Reference Range & Units 09/28/22 07:52   Hemoglobin A1C 0.0 - 5.6 % 6.4 (H)   (H): Data is abnormally high     Latest Reference Range & Units 09/28/22 07:52   Testosterone Total 240 - 950 ng/dL 214 (L)   (L): Data is abnormally low    REVIEW OF SYSTEMS    Endocrine: positive for hyperglycemia and low testosterone  Skin: negative  Eyes: positive for visual blurring, glasses  Ears/Nose/Throat: negative  Respiratory: No shortness of breath, dyspnea on exertion, cough, or hemoptysis  Cardiovascular: negative for, chest pain and dyspnea on exertion  Gastrointestinal: negative for, nausea, vomiting, constipation and diarrhea  Genitourinary: negative for, nocturia, dysuria, frequency and urgency  Musculoskeletal: positive for arthritis, joint pain, joint stiffness, muscular weakness and nocturnal cramping  Neurologic: negative for, headaches, numbness or tingling of feet and tremor  Psychiatric: positive for depression  Hematologic/Lymphatic/Immunologic: negative    Past Medical History  Past Medical History:   Diagnosis Date     Cervicalgia     Non-specific Cervical Spine Pain     Lumbago     Mechanical Low Back Pain     Muscular wasting and disuse atrophy, not elsewhere classified     Deconditioning Syndrome     Unspecified chronic suppurative otitis media        Medications  Current Outpatient Medications   Medication Sig Dispense Refill     albuterol  "(PROAIR HFA/PROVENTIL HFA/VENTOLIN HFA) 108 (90 Base) MCG/ACT inhaler Inhale 2 puffs into the lungs every 6 hours as needed for shortness of breath, wheezing or cough 18 g 0     multivitamin w/minerals (MULTIVITAMIN, THERAPEUTIC WITH MINERALS) tablet Take 1 tablet by mouth daily         Allergies  No Known Allergies      Family History  family history includes Diabetes in his father; Neuropathy in his father; No Known Problems in his brother and sister; Obesity in his brother; Pancreatic Cancer in his mother.    Social History  Social History     Tobacco Use     Smoking status: Never     Smokeless tobacco: Never   Vaping Use     Vaping Use: Never used   Substance Use Topics     Alcohol use: Yes     Comment: Alcoholic Drinks/day: rare     Drug use: Never       Physical Exam  BP (!) 156/94 (BP Location: Left arm, Patient Position: Chair, Cuff Size: Adult Large)   Pulse 83   Temp 98.9  F (37.2  C) (Tympanic)   Resp 16   Ht 1.791 m (5' 10.51\")   Wt 134.2 kg (295 lb 14.4 oz)   SpO2 95%   BMI 41.84 kg/m    Body mass index is 41.84 kg/m .  GENERAL :  In no apparent distress. Pt seems a little depressed  SKIN: Normal color, normal temperature, texture.  No hirsutism, alopecia or purple striae.     EYES: PERRL, EOMI, No scleral icterus,  No proptosis, conjunctival redness, stare, retraction  NECK: No visible masses. No carotid bruits.   RESP: Lungs clear to auscultation bilaterally  CARDIAC: Regular rate and rhythm, normal S1 S2, without murmurs, rubs or gallops    NEURO: awake, alert, responds appropriately to questions.  Cranial nerves intact.   Moves all extremities; Gait normal.  No tremor of the outstretched hand.   EXTREMITIES: No clubbing, cyanosis or edema.          "

## 2023-03-29 LAB
DEPRECATED CALCIDIOL+CALCIFEROL SERPL-MC: 17 UG/L (ref 20–75)
SHBG SERPL-SCNC: 23 NMOL/L (ref 11–80)

## 2023-03-30 PROBLEM — E11.10 DIABETIC KETOACIDOSIS WITHOUT COMA ASSOCIATED WITH TYPE 2 DIABETES MELLITUS (H): Status: ACTIVE | Noted: 2023-03-30

## 2023-03-30 PROBLEM — E11.9 DIABETES MELLITUS, TYPE 2 (H): Status: ACTIVE | Noted: 2023-03-30

## 2023-03-30 LAB
TESTOST FREE SERPL-MCNC: 4.45 NG/DL
TESTOST SERPL-MCNC: 193 NG/DL (ref 240–950)

## 2023-03-30 RX ORDER — METFORMIN HCL 500 MG
500 TABLET, EXTENDED RELEASE 24 HR ORAL 2 TIMES DAILY WITH MEALS
Qty: 180 TABLET | Refills: 0 | Status: SHIPPED | OUTPATIENT
Start: 2023-03-30 | End: 2023-07-18

## 2023-03-31 LAB — IGF-I BLD-MCNC: 90 NG/ML (ref 56–203)

## 2023-04-04 ENCOUNTER — TELEPHONE (OUTPATIENT)
Dept: OTOLARYNGOLOGY | Facility: CLINIC | Age: 58
End: 2023-04-04
Payer: COMMERCIAL

## 2023-04-04 DIAGNOSIS — K11.8 PAROTID MASS: Primary | ICD-10-CM

## 2023-04-04 NOTE — TELEPHONE ENCOUNTER
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 He did not answer the the voice mailbox was full. No message could be left.

## 2023-04-07 ENCOUNTER — TRANSFERRED RECORDS (OUTPATIENT)
Dept: HEALTH INFORMATION MANAGEMENT | Facility: CLINIC | Age: 58
End: 2023-04-07

## 2023-06-05 ENCOUNTER — OFFICE VISIT (OUTPATIENT)
Dept: FAMILY MEDICINE | Facility: CLINIC | Age: 58
End: 2023-06-05
Payer: COMMERCIAL

## 2023-06-05 VITALS
OXYGEN SATURATION: 98 % | DIASTOLIC BLOOD PRESSURE: 104 MMHG | TEMPERATURE: 97.9 F | HEART RATE: 68 BPM | SYSTOLIC BLOOD PRESSURE: 152 MMHG | BODY MASS INDEX: 40.87 KG/M2 | WEIGHT: 289 LBS | RESPIRATION RATE: 13 BRPM

## 2023-06-05 DIAGNOSIS — R31.0 GROSS HEMATURIA: Primary | ICD-10-CM

## 2023-06-05 LAB
ALBUMIN UR-MCNC: NEGATIVE MG/DL
APPEARANCE UR: ABNORMAL
BACTERIA #/AREA URNS HPF: ABNORMAL /HPF
BILIRUB UR QL STRIP: NEGATIVE
COLOR UR AUTO: ABNORMAL
GLUCOSE UR STRIP-MCNC: NEGATIVE MG/DL
HGB UR QL STRIP: ABNORMAL
KETONES UR STRIP-MCNC: NEGATIVE MG/DL
LEUKOCYTE ESTERASE UR QL STRIP: NEGATIVE
NITRATE UR QL: NEGATIVE
PH UR STRIP: 5.5 [PH] (ref 5–8)
RBC #/AREA URNS AUTO: ABNORMAL /HPF
SP GR UR STRIP: >=1.03 (ref 1–1.03)
SQUAMOUS #/AREA URNS AUTO: ABNORMAL /LPF
UROBILINOGEN UR STRIP-ACNC: 0.2 E.U./DL
WBC #/AREA URNS AUTO: ABNORMAL /HPF

## 2023-06-05 PROCEDURE — 99213 OFFICE O/P EST LOW 20 MIN: CPT | Performed by: NURSE PRACTITIONER

## 2023-06-05 PROCEDURE — 81001 URINALYSIS AUTO W/SCOPE: CPT | Performed by: NURSE PRACTITIONER

## 2023-06-05 ASSESSMENT — ENCOUNTER SYMPTOMS
HEMATURIA: 1
FREQUENCY: 0
ABDOMINAL PAIN: 0
FLANK PAIN: 0
NAUSEA: 0
VOMITING: 0

## 2023-06-05 NOTE — PATIENT INSTRUCTIONS
Your urine does not show an infection.  Please call tomorrow morning to be set up with an appointment for [facility].  You may go to any location.    Recheck ASAP in the emergency room if you feel like you are retaining urine or only going small amounts at a time or urinating large clots.

## 2023-06-05 NOTE — PROGRESS NOTES
"Assessment & Plan     Hematuria    - UA Macroscopic with reflex to Microscopic and Culture  - UA Microscopic with Reflex to Culture    Gross hematuria    - Adult Urology  Referral     Otherwise asymptomatic gross hematuria for the last 2 days.  No signs of urinary retention.  Illness symptoms.  No CVA tenderness no history of kidney stones.  He is a type II diabetic.    UA shows hematuria without white cells nor proteinuria.    Discussed possibility of cancer as needing to be ruled out.  Follow-up with [facility] as soon as possible, but about a week at most.  Watch for signs of urinary retention and go to emergency room if these happen.          Return in about 5 days (around 6/10/2023).    Courtney Valera, [facility] AILYN Rogers is a 57 year old male who presents to clinic today for the following health issues:  Chief Complaint   Patient presents with     Urinary Problem     X 2 days, blood in urine. No pain to report.      HPI    Patient with history of type 2 diabetes presents to walk-in clinic with gross hematuria.  Bright red blood x 2 days.  No clots.  No retaining.  Some urgency in the last couple of months.      No dysuria.  No fevers.  No flank pain. No h/o kidney stones.  Has a \"big cyst on my kidney.\"    No blood thinners.    Is an ultrasound tech.          Review of Systems   Gastrointestinal: Negative for abdominal pain, nausea and vomiting.   Genitourinary: Positive for hematuria and urgency (To be a little urgency for the last 2 months). Negative for decreased urine volume, flank pain, frequency and penile pain.           Objective    BP (!) 152/104 (BP Location: Right arm, Patient Position: Sitting, Cuff Size: Adult Regular)   Pulse 68   Temp 97.9  F (36.6  C) (Oral)   Resp 13   Wt 131.1 kg (289 lb)   SpO2 98%   BMI 40.87 kg/m    Physical Exam  Constitutional:       Appearance: He is well-developed.   HENT:      Right Ear: External ear " normal.      Left Ear: External ear normal.   Eyes:      General:         Right eye: No discharge.         Left eye: No discharge.      Conjunctiva/sclera: Conjunctivae normal.   Pulmonary:      Effort: Pulmonary effort is normal.   Abdominal:      Tenderness: There is no right CVA tenderness or left CVA tenderness.   Musculoskeletal:         General: Normal range of motion.   Skin:     General: Skin is warm.   Neurological:      Mental Status: He is alert and oriented to person, place, and time.   Psychiatric:         Behavior: Behavior normal.         Thought Content: Thought content normal.         Judgment: Judgment normal.            Results for orders placed or performed in visit on 06/05/23 (from the past 24 hour(s))   UA Macroscopic with reflex to Microscopic and Culture    Specimen: Urine, Clean Catch   Result Value Ref Range    Color Urine Shama (A) Colorless, Straw, Light Yellow, Yellow    Appearance Urine Slightly Cloudy (A) Clear    Glucose Urine Negative Negative mg/dL    Bilirubin Urine Negative Negative    Ketones Urine Negative Negative mg/dL    Specific Gravity Urine >=1.030 1.005 - 1.030    Blood Urine Large (A) Negative    pH Urine 5.5 5.0 - 8.0    Protein Albumin Urine Negative Negative mg/dL    Urobilinogen Urine 0.2 0.2, 1.0 E.U./dL    Nitrite Urine Negative Negative    Leukocyte Esterase Urine Negative Negative   UA Microscopic with Reflex to Culture   Result Value Ref Range    Bacteria Urine Few (A) None Seen /HPF    RBC Urine 25-50 (A) 0-2 /HPF /HPF    WBC Urine 0-5 0-5 /HPF /HPF    Squamous Epithelials Urine Few (A) None Seen /LPF    Narrative    Urine Culture not indicated

## 2023-06-08 ENCOUNTER — TRANSFERRED RECORDS (OUTPATIENT)
Dept: HEALTH INFORMATION MANAGEMENT | Facility: CLINIC | Age: 58
End: 2023-06-08

## 2023-06-08 ENCOUNTER — HOSPITAL ENCOUNTER (OUTPATIENT)
Dept: CT IMAGING | Facility: HOSPITAL | Age: 58
Discharge: HOME | End: 2023-06-08
Attending: UROLOGY | Admitting: UROLOGY
Payer: COMMERCIAL

## 2023-06-08 DIAGNOSIS — R31.0 GROSS HEMATURIA: ICD-10-CM

## 2023-06-08 LAB
CREAT BLD-MCNC: 1.4 MG/DL (ref 0.7–1.3)
GFR SERPL CREATININE-BSD FRML MDRD: 58 ML/MIN/1.73M2

## 2023-06-08 PROCEDURE — 82565 ASSAY OF CREATININE: CPT

## 2023-06-08 PROCEDURE — 250N000011 HC RX IP 250 OP 636: Performed by: UROLOGY

## 2023-06-08 PROCEDURE — 74178 CT ABD&PLV WO CNTR FLWD CNTR: CPT

## 2023-06-08 RX ORDER — IOPAMIDOL 755 MG/ML
90 INJECTION, SOLUTION INTRAVASCULAR ONCE
Status: COMPLETED | OUTPATIENT
Start: 2023-06-08 | End: 2023-06-08

## 2023-06-08 RX ADMIN — IOPAMIDOL 90 ML: 755 INJECTION, SOLUTION INTRAVENOUS at 14:57

## 2023-06-13 ENCOUNTER — OFFICE VISIT (OUTPATIENT)
Dept: ENDOCRINOLOGY | Facility: CLINIC | Age: 58
End: 2023-06-13
Payer: COMMERCIAL

## 2023-06-13 VITALS
RESPIRATION RATE: 16 BRPM | WEIGHT: 290.7 LBS | DIASTOLIC BLOOD PRESSURE: 90 MMHG | HEART RATE: 72 BPM | SYSTOLIC BLOOD PRESSURE: 145 MMHG | TEMPERATURE: 98.6 F | HEIGHT: 71 IN | BODY MASS INDEX: 40.7 KG/M2 | OXYGEN SATURATION: 97 %

## 2023-06-13 DIAGNOSIS — R79.89 LOW TESTOSTERONE: ICD-10-CM

## 2023-06-13 DIAGNOSIS — E11.65 TYPE 2 DIABETES MELLITUS WITH HYPERGLYCEMIA, WITHOUT LONG-TERM CURRENT USE OF INSULIN (H): Primary | ICD-10-CM

## 2023-06-13 DIAGNOSIS — I10 BENIGN ESSENTIAL HYPERTENSION: ICD-10-CM

## 2023-06-13 DIAGNOSIS — E66.01 MORBID OBESITY (H): ICD-10-CM

## 2023-06-13 LAB
ANION GAP SERPL CALCULATED.3IONS-SCNC: 12 MMOL/L (ref 7–15)
BUN SERPL-MCNC: 15.8 MG/DL (ref 6–20)
CALCIUM SERPL-MCNC: 9.3 MG/DL (ref 8.6–10)
CHLORIDE SERPL-SCNC: 103 MMOL/L (ref 98–107)
CHOLEST SERPL-MCNC: 184 MG/DL
CREAT SERPL-MCNC: 1.09 MG/DL (ref 0.67–1.17)
DEPRECATED HCO3 PLAS-SCNC: 24 MMOL/L (ref 22–29)
GFR SERPL CREATININE-BSD FRML MDRD: 79 ML/MIN/1.73M2
GLUCOSE SERPL-MCNC: 100 MG/DL (ref 70–99)
HBA1C MFR BLD: 6.3 % (ref 0–5.6)
HDLC SERPL-MCNC: 33 MG/DL
LDLC SERPL CALC-MCNC: 116 MG/DL
NONHDLC SERPL-MCNC: 151 MG/DL
POTASSIUM SERPL-SCNC: 4.6 MMOL/L (ref 3.4–5.3)
SODIUM SERPL-SCNC: 139 MMOL/L (ref 136–145)
TRIGL SERPL-MCNC: 173 MG/DL

## 2023-06-13 PROCEDURE — 80061 LIPID PANEL: CPT | Performed by: PHYSICIAN ASSISTANT

## 2023-06-13 PROCEDURE — 80048 BASIC METABOLIC PNL TOTAL CA: CPT | Performed by: PHYSICIAN ASSISTANT

## 2023-06-13 PROCEDURE — 99214 OFFICE O/P EST MOD 30 MIN: CPT | Performed by: PHYSICIAN ASSISTANT

## 2023-06-13 PROCEDURE — 83036 HEMOGLOBIN GLYCOSYLATED A1C: CPT | Performed by: PHYSICIAN ASSISTANT

## 2023-06-13 PROCEDURE — 36415 COLL VENOUS BLD VENIPUNCTURE: CPT | Performed by: PHYSICIAN ASSISTANT

## 2023-06-13 RX ORDER — LOSARTAN POTASSIUM 25 MG/1
25 TABLET ORAL DAILY
Qty: 90 TABLET | Refills: 0 | Status: SHIPPED | OUTPATIENT
Start: 2023-06-13 | End: 2023-09-12

## 2023-06-13 RX ORDER — TESTOSTERONE 1.62 MG/G
40.5 GEL TRANSDERMAL DAILY
Qty: 75 G | Refills: 0 | Status: SHIPPED | OUTPATIENT
Start: 2023-06-13 | End: 2023-07-18

## 2023-06-13 ASSESSMENT — ENCOUNTER SYMPTOMS
NAUSEA: 1
DIFFICULTY URINATING: 0
HEMATURIA: 1
FLANK PAIN: 0
DYSURIA: 0
HEARTBURN: 1
DIARRHEA: 1

## 2023-06-13 NOTE — PROGRESS NOTES
Assessment/Plan :   1. Type 2 DM. Ken started the metformin but really hasn't seen an improvement. He has also developed some GI side effects and headaches. We did not send in a glucometer and he would like to know where his blood sugars are. We discussed options and I will send in a new prescription for the FreeStyle Gio. This should help him get a better idea of how different foods and activities affect his blood sugars. We also discussed medication options. His wife is a pharmacist and mentioned switching to Ozempic. This medication works well to control blood sugars and it may help him with weight loss. We will discontinue the metformin and I will send in a new prescription for 0.25 mg once weekly Ozempic. If he has any problems with the new medication, he is to contact my office.    2. Hypogonadism. We reviewed his previous testosterone levels and he does have low testosterone. We discussed how this relates to diabetes. I think he would feel better on a testosterone replacement. We discussed options and I will send in a new prescription for the testosterone pump. He is to apply 2 pumps topically daily.     3. Hypertension. His blood pressure has been a little elevated over the last few months. We discussed the importance of getting this back to a normal range, especially in light of his new diagnoses of diabetes and hypogonadism. I will send in a new prescription for losartan 25 mg daily.     If he has any problems with the above medications, he is to contact my office. We will plan on a follow-up appt in 1 month.      I have independently reviewed and interpreted labs, imaging as indicated.      Chief complaint:  Juanito is a 57 year old male who returns for follow-up of Type 2 DM and fatigue.    I have reviewed Care Everywhere including [facility], [facility], [facility], [facility], [place], [facility], [facility] , [facility], [place] lab reports, imaging reports and provider notes as indicated.       HISTORY OF PRESENT ILLNESS  Ken has been struggling with progressive fatigue and weight gain for the last few years. He does feel like the worsening symptoms are linked to recurrent COVID infections and, unfortunately, the symptoms seem to be worsening. He had a follow-up visit with his primary care provider and he was told that his A1C was up and that he had low testosterone. He was then seen at my office on March 28th. We repeated a hemoglobin A1C and testosterone along with pituitary testing and a vitamin D level. His A1C had continued to increase into the diabetic range. His testosterone also remained low. A new prescription was sent in for metformin.    He has been taking metformin  mg 1 tablet twice a day for the last couple of months. He still doesn't feel good and the metformin causes the occasional upset stomach along with headaches. He has not noticed any change in his weight. He did have an odd episode of hematuria that they think may be related to kidney stones. Otherwise, nothing has really changed.    Endocrine relevant labs are as follows:   Latest Reference Range & Units 03/28/23 14:59   Hemoglobin A1C 0.0 - 5.6 % 7.6 (H)   (H): Data is abnormally high   Latest Reference Range & Units 03/28/23 14:59   Testosterone Total 240 - 950 ng/dL 193 (L)   (L): Data is abnormally low   Latest Reference Range & Units 03/28/23 14:59   Vitamin D Deficiency screening 20 - 75 ug/L 17 (L)   (L): Data is abnormally low   Latest Reference Range & Units 06/13/23 14:35   Hemoglobin A1C 0.0 - 5.6 % 6.3 (H)   (H): Data is abnormally high    REVIEW OF SYSTEMS  Answers for HPI/ROS submitted by the patient on 6/13/2023  General Symptoms: No  Skin Symptoms: No  HENT Symptoms: No  EYE SYMPTOMS: No  HEART SYMPTOMS: No  LUNG SYMPTOMS: No  INTESTINAL SYMPTOMS: Yes  URINARY SYMPTOMS: Yes  REPRODUCTIVE SYMPTOMS: No  SKELETAL SYMPTOMS: No  BLOOD SYMPTOMS: No  NERVOUS SYSTEM SYMPTOMS: No  MENTAL HEALTH SYMPTOMS: No  Heart  burn or indigestion: Yes  Nausea: Yes  Diarrhea: Yes  Trouble holding urine or incontinence: Yes  Pain or burning: No  Trouble starting or stopping: No  Increased frequency of urination: Yes  Blood in urine: Yes  Decreased frequency of urination: Yes  Frequent nighttime urination: Yes  Flank pain: No  Difficulty emptying bladder: No    Endocrine: positive for diabetes and hypogonadism  Skin: negative  Eyes: negative for, visual blurring, redness, tearing, itching  Ears/Nose/Throat: negative  Respiratory: No shortness of breath, dyspnea on exertion, cough, or hemoptysis  Cardiovascular: negative for, chest pain, dyspnea on exertion and lower extremity edema  Gastrointestinal: positive for abdominal pain and diarrhea, negative for, nausea, vomiting and constipation  Genitourinary: negative for, nocturia, dysuria, frequency and urgency  Musculoskeletal: positive for fatigue, negative for, muscular weakness and nocturnal cramping  Neurologic: negative for and numbness or tingling of feet  Psychiatric: negative  Hematologic/Lymphatic/Immunologic: negative    Past Medical History  Past Medical History:   Diagnosis Date     Cervicalgia     Non-specific Cervical Spine Pain     Lumbago     Mechanical Low Back Pain     Muscular wasting and disuse atrophy, not elsewhere classified     Deconditioning Syndrome     Unspecified chronic suppurative otitis media        Medications  Current Outpatient Medications   Medication Sig Dispense Refill     albuterol (PROAIR HFA/PROVENTIL HFA/VENTOLIN HFA) 108 (90 Base) MCG/ACT inhaler Inhale 2 puffs into the lungs every 6 hours as needed for shortness of breath, wheezing or cough 18 g 0     metFORMIN (GLUCOPHAGE XR) 500 MG 24 hr tablet Take 1 tablet (500 mg) by mouth 2 times daily (with meals) 180 tablet 0     multivitamin w/minerals (MULTIVITAMIN, THERAPEUTIC WITH MINERALS) tablet Take 1 tablet by mouth daily       STATIN NOT PRESCRIBED (INTENTIONAL) Please choose reason not prescribed  "from choices below.         Allergies  No Known Allergies      Family History  family history includes Diabetes in his father; Neuropathy in his father; No Known Problems in his brother and sister; Obesity in his brother; Pancreatic Cancer in his mother.    Social History  Social History     Tobacco Use     Smoking status: Never     Smokeless tobacco: Never   Vaping Use     Vaping status: Never Used   Substance Use Topics     Alcohol use: Yes     Comment: Alcoholic Drinks/day: rare     Drug use: Never       Physical Exam  BP (!) 145/90 (BP Location: Left arm, Patient Position: Chair, Cuff Size: Adult Large)   Pulse 72   Temp 98.6  F (37  C) (Tympanic)   Resp 16   Ht 1.791 m (5' 10.51\")   Wt 131.9 kg (290 lb 11.2 oz)   SpO2 97%   BMI 41.11 kg/m    Body mass index is 41.11 kg/m .  GENERAL :  In no apparent distress. Pt appears tired  SKIN: Normal color, normal temperature, texture.  No hirsutism, alopecia or purple striae.     EYES: PERRL, EOMI, No scleral icterus,  No proptosis, conjunctival redness, stare, retraction  NEURO: awake, alert, responds appropriately to questions.  Cranial nerves intact.   Moves all extremities; Gait normal.  No tremor of the outstretched hand.   EXTREMITIES: No clubbing, cyanosis or edema.    DATA REVIEW  He is not currently monitoring his blood sugars          "

## 2023-06-13 NOTE — LETTER
6/13/2023         RE: Juanito Delacruz  [id number] [facility] [address]        Dear Colleague,    Thank you for referring your patient, Juanito Delacruz, to the [facility]. Please see a copy of my visit note below.    Assessment/Plan :   1. Type 2 DM. Ken started the metformin but really hasn't seen an improvement. He has also developed some GI side effects and headaches. We did not send in a glucometer and he would like to know where his blood sugars are. We discussed options and I will send in a new prescription for the FreeStyle Gio. This should help him get a better idea of how different foods and activities affect his blood sugars. We also discussed medication options. His wife is a pharmacist and mentioned switching to Ozempic. This medication works well to control blood sugars and it may help him with weight loss. We will discontinue the metformin and I will send in a new prescription for 0.25 mg once weekly Ozempic. If he has any problems with the new medication, he is to contact my office.    2. Hypogonadism. We reviewed his previous testosterone levels and he does have low testosterone. We discussed how this relates to diabetes. I think he would feel better on a testosterone replacement. We discussed options and I will send in a new prescription for the testosterone pump. He is to apply 2 pumps topically daily.     3. Hypertension. His blood pressure has been a little elevated over the last few months. We discussed the importance of getting this back to a normal range, especially in light of his new diagnoses of diabetes and hypogonadism. I will send in a new prescription for losartan 25 mg daily.     If he has any problems with the above medications, he is to contact my office. We will plan on a follow-up appt in 1 month.      I have independently reviewed and interpreted labs, imaging as indicated.      Chief complaint:  Juanito is a 57 year old male who returns for  follow-up of Type 2 DM and fatigue.    I have reviewed Care Everywhere including [facility], [facility], [facility], [facility], [facility] , [facility], [facility] lab reports, imaging reports and provider notes as indicated.      HISTORY OF PRESENT ILLNESS  Ken has been struggling with progressive fatigue and weight gain for the last few years. He does feel like the worsening symptoms are linked to recurrent COVID infections and, unfortunately, the symptoms seem to be worsening. He had a follow-up visit with his primary care provider and he was told that his A1C was up and that he had low testosterone. He was then seen at my office on March 28th. We repeated a hemoglobin A1C and testosterone along with pituitary testing and a vitamin D level. His A1C had continued to increase into the diabetic range. His testosterone also remained low. A new prescription was sent in for metformin.    He has been taking metformin  mg 1 tablet twice a day for the last couple of months. He still doesn't feel good and the metformin causes the occasional upset stomach along with headaches. He has not noticed any change in his weight. He did have an odd episode of hematuria that they think may be related to kidney stones. Otherwise, nothing has really changed.    Endocrine relevant labs are as follows:   Latest Reference Range & Units 03/28/23 14:59   Hemoglobin A1C 0.0 - 5.6 % 7.6 (H)   (H): Data is abnormally high   Latest Reference Range & Units 03/28/23 14:59   Testosterone Total 240 - 950 ng/dL 193 (L)   (L): Data is abnormally low   Latest Reference Range & Units 03/28/23 14:59   Vitamin D Deficiency screening 20 - 75 ug/L 17 (L)   (L): Data is abnormally low   Latest Reference Range & Units 06/13/23 14:35   Hemoglobin A1C 0.0 - 5.6 % 6.3 (H)   (H): Data is abnormally high    REVIEW OF SYSTEMS  Answers for HPI/ROS submitted by the patient on 6/13/2023  General Symptoms: No  Skin Symptoms: No  HENT  Symptoms: No  EYE SYMPTOMS: No  HEART SYMPTOMS: No  LUNG SYMPTOMS: No  INTESTINAL SYMPTOMS: Yes  URINARY SYMPTOMS: Yes  REPRODUCTIVE SYMPTOMS: No  SKELETAL SYMPTOMS: No  BLOOD SYMPTOMS: No  NERVOUS SYSTEM SYMPTOMS: No  MENTAL HEALTH SYMPTOMS: No  Heart burn or indigestion: Yes  Nausea: Yes  Diarrhea: Yes  Trouble holding urine or incontinence: Yes  Pain or burning: No  Trouble starting or stopping: No  Increased frequency of urination: Yes  Blood in urine: Yes  Decreased frequency of urination: Yes  Frequent nighttime urination: Yes  Flank pain: No  Difficulty emptying bladder: No    Endocrine: positive for diabetes and hypogonadism  Skin: negative  Eyes: negative for, visual blurring, redness, tearing, itching  Ears/Nose/Throat: negative  Respiratory: No shortness of breath, dyspnea on exertion, cough, or hemoptysis  Cardiovascular: negative for, chest pain, dyspnea on exertion and lower extremity edema  Gastrointestinal: positive for abdominal pain and diarrhea, negative for, nausea, vomiting and constipation  Genitourinary: negative for, nocturia, dysuria, frequency and urgency  Musculoskeletal: positive for fatigue, negative for, muscular weakness and nocturnal cramping  Neurologic: negative for and numbness or tingling of feet  Psychiatric: negative  Hematologic/Lymphatic/Immunologic: negative    Past Medical History  Past Medical History:   Diagnosis Date     Cervicalgia     Non-specific Cervical Spine Pain     Lumbago     Mechanical Low Back Pain     Muscular wasting and disuse atrophy, not elsewhere classified     Deconditioning Syndrome     Unspecified chronic suppurative otitis media        Medications  Current Outpatient Medications   Medication Sig Dispense Refill     albuterol (PROAIR HFA/PROVENTIL HFA/VENTOLIN HFA) 108 (90 Base) MCG/ACT inhaler Inhale 2 puffs into the lungs every 6 hours as needed for shortness of breath, wheezing or cough 18 g 0     metFORMIN (GLUCOPHAGE XR) 500 MG 24 hr tablet Take 1  "tablet (500 mg) by mouth 2 times daily (with meals) 180 tablet 0     multivitamin w/minerals (MULTIVITAMIN, THERAPEUTIC WITH MINERALS) tablet Take 1 tablet by mouth daily       STATIN NOT PRESCRIBED (INTENTIONAL) Please choose reason not prescribed from choices below.         Allergies  No Known Allergies      Family History  family history includes Diabetes in his father; Neuropathy in his father; No Known Problems in his brother and sister; Obesity in his brother; Pancreatic Cancer in his mother.    Social History  Social History     Tobacco Use     Smoking status: Never     Smokeless tobacco: Never   Vaping Use     Vaping status: Never Used   Substance Use Topics     Alcohol use: Yes     Comment: Alcoholic Drinks/day: rare     Drug use: Never       Physical Exam  BP (!) 145/90 (BP Location: Left arm, Patient Position: Chair, Cuff Size: Adult Large)   Pulse 72   Temp 98.6  F (37  C) (Tympanic)   Resp 16   Ht 1.791 m (5' 10.51\")   Wt 131.9 kg (290 lb 11.2 oz)   SpO2 97%   BMI 41.11 kg/m    Body mass index is 41.11 kg/m .  GENERAL :  In no apparent distress. Pt appears tired  SKIN: Normal color, normal temperature, texture.  No hirsutism, alopecia or purple striae.     EYES: PERRL, EOMI, No scleral icterus,  No proptosis, conjunctival redness, stare, retraction  NEURO: awake, alert, responds appropriately to questions.  Cranial nerves intact.   Moves all extremities; Gait normal.  No tremor of the outstretched hand.   EXTREMITIES: No clubbing, cyanosis or edema.    DATA REVIEW  He is not currently monitoring his blood sugars              Again, thank you for allowing me to participate in the care of your patient.        Sincerely,        Barbara Jimenes PA-C    "

## 2023-06-20 ENCOUNTER — HOSPITAL ENCOUNTER (OUTPATIENT)
Dept: CT IMAGING | Facility: CLINIC | Age: 58
Discharge: HOME | End: 2023-06-20
Attending: INTERNAL MEDICINE | Admitting: INTERNAL MEDICINE
Payer: COMMERCIAL

## 2023-06-20 DIAGNOSIS — Z91.89 INTERMEDIATE RISK FOR CORONARY ARTERY DISEASE: ICD-10-CM

## 2023-06-20 DIAGNOSIS — E11.9 TYPE 2 DIABETES MELLITUS WITHOUT COMPLICATION, WITHOUT LONG-TERM CURRENT USE OF INSULIN (H): ICD-10-CM

## 2023-06-20 PROCEDURE — 75571 CT HRT W/O DYE W/CA TEST: CPT

## 2023-06-20 PROCEDURE — 75571 CT HRT W/O DYE W/CA TEST: CPT | Mod: 26 | Performed by: GENERAL ACUTE CARE HOSPITAL

## 2023-06-21 LAB
CV CALCIUM SCORE AGATSTON LM: 0
CV CALCIUM SCORING AGATSON LAD: 0
CV CALCIUM SCORING AGATSTON CX: 0
CV CALCIUM SCORING AGATSTON RCA: 0
CV CALCIUM SCORING AGATSTON TOTAL: 0

## 2023-06-28 ENCOUNTER — HOSPITAL ENCOUNTER (OUTPATIENT)
Dept: CT IMAGING | Facility: HOSPITAL | Age: 58
Discharge: HOME | End: 2023-06-28
Attending: UROLOGY | Admitting: UROLOGY
Payer: COMMERCIAL

## 2023-06-28 DIAGNOSIS — N20.0 CALCULUS OF KIDNEY: ICD-10-CM

## 2023-06-28 PROCEDURE — 74176 CT ABD & PELVIS W/O CONTRAST: CPT

## 2023-07-12 DIAGNOSIS — R79.89 LOW TESTOSTERONE: ICD-10-CM

## 2023-07-18 ENCOUNTER — OFFICE VISIT (OUTPATIENT)
Dept: ENDOCRINOLOGY | Facility: CLINIC | Age: 58
End: 2023-07-18
Payer: COMMERCIAL

## 2023-07-18 VITALS
RESPIRATION RATE: 16 BRPM | HEIGHT: 71 IN | OXYGEN SATURATION: 98 % | DIASTOLIC BLOOD PRESSURE: 85 MMHG | HEART RATE: 76 BPM | SYSTOLIC BLOOD PRESSURE: 137 MMHG | TEMPERATURE: 98 F | BODY MASS INDEX: 40.44 KG/M2 | WEIGHT: 288.9 LBS

## 2023-07-18 DIAGNOSIS — E11.65 TYPE 2 DIABETES MELLITUS WITH HYPERGLYCEMIA, WITHOUT LONG-TERM CURRENT USE OF INSULIN (H): Primary | ICD-10-CM

## 2023-07-18 DIAGNOSIS — E66.01 MORBID OBESITY (H): ICD-10-CM

## 2023-07-18 DIAGNOSIS — R79.89 LOW TESTOSTERONE: ICD-10-CM

## 2023-07-18 PROCEDURE — 99214 OFFICE O/P EST MOD 30 MIN: CPT | Performed by: PHYSICIAN ASSISTANT

## 2023-07-18 RX ORDER — TESTOSTERONE 1.62 MG/G
40.5 GEL TRANSDERMAL DAILY
Qty: 75 G | Refills: 0 | Status: SHIPPED | OUTPATIENT
Start: 2023-07-18 | End: 2023-09-12

## 2023-07-18 RX ORDER — TAMSULOSIN HYDROCHLORIDE 0.4 MG/1
0.4 CAPSULE ORAL DAILY
COMMUNITY
Start: 2023-06-28 | End: 2023-12-01

## 2023-07-18 RX ORDER — OXYCODONE HYDROCHLORIDE 5 MG/1
5-10 TABLET ORAL EVERY 4 HOURS PRN
COMMUNITY
Start: 2023-06-30 | End: 2023-12-01

## 2023-07-18 RX ORDER — HYDROCODONE BITARTRATE AND ACETAMINOPHEN 5; 325 MG/1; MG/1
TABLET ORAL
Status: ON HOLD | COMMUNITY
End: 2023-12-26

## 2023-07-18 RX ORDER — ONDANSETRON 8 MG/1
8 TABLET, FILM COATED ORAL EVERY 8 HOURS PRN
COMMUNITY
Start: 2023-06-30 | End: 2024-04-05

## 2023-07-18 ASSESSMENT — ENCOUNTER SYMPTOMS
VOMITING: 1
DYSURIA: 1
DIARRHEA: 1
HEMATURIA: 1
NAUSEA: 1
HEARTBURN: 0
BLOATING: 0
BOWEL INCONTINENCE: 0
ABDOMINAL PAIN: 1
CONSTIPATION: 1
BLOOD IN STOOL: 0
FLANK PAIN: 1
RECTAL PAIN: 0
DIFFICULTY URINATING: 0
JAUNDICE: 0

## 2023-07-18 NOTE — PROGRESS NOTES
Assessment/Plan :   1. Type 2 DM. Ken has not noticed an specific adverse effects from the Ozempic. He has a couple more weeks of 0.25 mg and then he will increase to 0.5 mg weekly. He has had some nausea but he attributes it to pain. If he has any problems on the increased dose, he is to contact our office. Again, we reviewed the possible adverse effects.    We also reviewed the importance and value of monitoring blood sugar. He was going to look into getting a Gio sensor but with everything going on in regards to the kidney stone, he never got around to it. He will look into it, again. We discussed how food affects blood sugar but also how the impact of different foods can vary from one individual to the next.  I really think a trial of a CGMS device would help Ken make better dietary decisions moving forward.    He will increase the Ozempic dose and we will plan on a follow-up appt in October.    2. Low testosterone. He has not really noticed any difference since starting topical testosterone therapy. He also has not experienced any adverse effects. We will hold off on repeat testing, until after he has passed the kidney stone. He will continue with 40.5 mg daily.      I have independently reviewed and interpreted labs, imaging as indicated.      Chief complaint:  Juanito is a 58 year old male who returns for follow-up of Type 2 DM and hypogonadism.    I have reviewed Care Everywhere including [facility], [facility], [facility], [facility], [facility] , [facility], [place] lab reports, imaging reports and provider notes as indicated.      HISTORY OF PRESENT ILLNESS  Ken has not had a good month. After our last appointment he was diagnosed with a kidney stone and it has yet to pass. He may need to under lithotripsy but they are waiting to see if the stone will pass on its own. Due to this, he did not start Ozempic right away. He has used it twice and he will administer his third 0.25 mg dose  tomorrow. He doesn't feel like he has had any adverse effects. He has had a lot of nausea and fatigue but he attributes it to pain, not to Ozempic. He also started the topical testosterone but again, he has not noticed an improvement in fatigue. Whether this is due to the medication not working or to the kidney stone, is unknown.    Ken has been struggling with fatigue and weight gain for the last 3 years. He feels like some of his worsening fatigue and weight gain was due to recurrent COVID infections. Whatever the cause, he just has not been able to get back on track. He was officially diagnosed with diabetes in March of this year. At the time of diagnosis we started him on metformin. He didn't feel like the metformin did much for him. He continued to feel fatigued and it didn't have any impact on his weight. We then decided to switch to Ozempic. He was also found to have low testosterone, which may have been contributing to fatigue. We started him on a topical testosterone in June.    He has not had any problems with severe hyperglycemia and/or hypoglycemia. He is struggling with nausea but it seems to be more connected to pain. He has noticed that his vision has worsened over the last year. He denies any problems with worsening numbness/tingling in his feet, however, he does complain over diffuse body aches and muscle weakness.    Endocrine relevant labs are as follows:   Latest Reference Range & Units 06/13/23 14:35   Hemoglobin A1C 0.0 - 5.6 % 6.3 (H)   (H): Data is abnormally high     Latest Reference Range & Units 03/28/23 14:59   Testosterone Total 240 - 950 ng/dL 193 (L)   (L): Data is abnormally low    REVIEW OF SYSTEMS  Answers for HPI/ROS submitted by the patient on 7/18/2023  General Symptoms: No  Skin Symptoms: No  HENT Symptoms: No  EYE SYMPTOMS: No  HEART SYMPTOMS: No  LUNG SYMPTOMS: No  INTESTINAL SYMPTOMS: Yes  URINARY SYMPTOMS: Yes  REPRODUCTIVE SYMPTOMS: No  SKELETAL SYMPTOMS: No  BLOOD  SYMPTOMS: No  NERVOUS SYSTEM SYMPTOMS: No  MENTAL HEALTH SYMPTOMS: No  Heart burn or indigestion: No  Nausea: Yes  Vomiting: Yes  Abdominal pain: Yes  Bloating: No  Constipation: Yes  Diarrhea: Yes  Blood in stool: No  Black stools: No  Rectal or Anal pain: No  Fecal incontinence: No  Yellowing of skin or eyes: No  Vomit with blood: No  Change in stools: No  Trouble holding urine or incontinence: No  Pain or burning: Yes  Trouble starting or stopping: No  Increased frequency of urination: No  Blood in urine: Yes  Decreased frequency of urination: No  Frequent nighttime urination: No  Flank pain: Yes  Difficulty emptying bladder: No    Endocrine: positive for diabetes and hypogonadism  Skin: negative  Eyes: positive for visual blurring, negative for, redness, tearing, itching  Ears/Nose/Throat: negative for, nasal congestion, persistent sore throat, hoarseness  Respiratory: No shortness of breath, dyspnea on exertion, cough, or hemoptysis  Cardiovascular: negative for, chest pain, dyspnea on exertion and exercise intolerance  Gastrointestinal: positive for nausea, negative for, vomiting, constipation and diarrhea  Genitourinary: negative for, nocturia, dysuria, frequency and urgency  Musculoskeletal: positive for muscular weakness, negative for and nocturnal cramping  Neurologic: negative for, numbness or tingling of feet and tremor  Psychiatric: negative  Hematologic/Lymphatic/Immunologic: negative    Past Medical History  Past Medical History:   Diagnosis Date     Cervicalgia     Non-specific Cervical Spine Pain     Lumbago     Mechanical Low Back Pain     Muscular wasting and disuse atrophy, not elsewhere classified     Deconditioning Syndrome     Unspecified chronic suppurative otitis media        Medications  Current Outpatient Medications   Medication Sig Dispense Refill     albuterol (PROAIR HFA/PROVENTIL HFA/VENTOLIN HFA) 108 (90 Base) MCG/ACT inhaler Inhale 2 puffs into the lungs every 6 hours as needed for  "shortness of breath, wheezing or cough 18 g 0     HYDROcodone-acetaminophen (NORCO) 5-325 MG tablet TAKE 1 TO 2 TABLETS BY MOUTH EVERY 4 HOURS AS NEEDED FOR PAIN.       losartan (COZAAR) 25 MG tablet Take 1 tablet (25 mg) by mouth daily 90 tablet 0     multivitamin w/minerals (MULTIVITAMIN, THERAPEUTIC WITH MINERALS) tablet Take 1 tablet by mouth daily       ondansetron (ZOFRAN) 8 MG tablet Take 8 mg by mouth every 8 hours as needed       oxyCODONE (ROXICODONE) 5 MG tablet Take 5-10 mg by mouth every 4 hours as needed       semaglutide (OZEMPIC) 2 MG/3ML pen Inject 0.25 mg Subcutaneous every 7 days 3 mL 1     STATIN NOT PRESCRIBED (INTENTIONAL) Please choose reason not prescribed from choices below.       tamsulosin (FLOMAX) 0.4 MG capsule Take 0.4 mg by mouth daily       testosterone (ANDROGEL 1.62 % PUMP) 20.25 MG/ACT gel Place 40.5 mg onto the skin daily Apply from dispenser to clean, dry, intact skin of the upper arms and shoulders. 75 g 0     Continuous Blood Gluc Sensor (FREESTYLE LU[place] 2 SENSOR) MISC Change every 14 days. 6 each 1       Allergies  No Known Allergies      Family History  family history includes Diabetes in his father; Neuropathy in his father; No Known Problems in his brother and sister; Obesity in his brother; Pancreatic Cancer in his mother.    Social History  Social History     Tobacco Use     Smoking status: Never     Smokeless tobacco: Never   Vaping Use     Vaping Use: Never used   Substance Use Topics     Alcohol use: Yes     Comment: Alcoholic Drinks/day: rare     Drug use: Never       Physical Exam  /85 (BP Location: Left arm, Patient Position: Chair, Cuff Size: Adult Large)   Pulse 76   Temp 98  F (36.7  C) (Tympanic)   Resp 16   Ht 1.791 m (5' 10.51\")   Wt 131 kg (288 lb 14.4 oz)   SpO2 98%   BMI 40.85 kg/m    Body mass index is 40.85 kg/m .  GENERAL :  In no apparent distress  SKIN: Normal color, normal temperature, texture.  No hirsutism, alopecia or purple striae.   "   EYES: PERRL, EOMI, No scleral icterus,  No proptosis, conjunctival redness, stare, retraction  NEURO: awake, alert, responds appropriately to questions.  Cranial nerves intact.   Moves all extremities; Gait normal.  No tremor of the outstretched hand.    EXTREMITIES: No clubbing, cyanosis or edema.    DATA REVIEW  Pt is not monitoring blood sugar

## 2023-07-18 NOTE — LETTER
7/18/2023         RE: Juanito Delacruz  [id number] [facility] [address]        Dear Colleague,    Thank you for referring your patient, Juanito Delacruz, to the [facility]. Please see a copy of my visit note below.    Assessment/Plan :   1. Type 2 DM. Ken has not noticed an specific adverse effects from the Ozempic. He has a couple more weeks of 0.25 mg and then he will increase to 0.5 mg weekly. He has had some nausea but he attributes it to pain. If he has any problems on the increased dose, he is to contact our office. Again, we reviewed the possible adverse effects.    We also reviewed the importance and value of monitoring blood sugar. He was going to look into getting a Gio sensor but with everything going on in regards to the kidney stone, he never got around to it. He will look into it, again. We discussed how food affects blood sugar but also how the impact of different foods can vary from one individual to the next.  I really think a trial of a CGMS device would help Ken make better dietary decisions moving forward.    He will increase the Ozempic dose and we will plan on a follow-up appt in October.    2. Low testosterone. He has not really noticed any difference since starting topical testosterone therapy. He also has not experienced any adverse effects. We will hold off on repeat testing, until after he has passed the kidney stone. He will continue with 40.5 mg daily.      I have independently reviewed and interpreted labs, imaging as indicated.      Chief complaint:  Juanito is a 58 year old male who returns for follow-up of Type 2 DM and hypogonadism.    I have reviewed Care Everywhere including [facility], [facility], [facility], [facility], [place], [facility], [facility] , [facility], [place] lab reports, imaging reports and provider notes as indicated.      HISTORY OF PRESENT ILLNESS  Ken has not had a good month. After our last appointment he was diagnosed with  a kidney stone and it has yet to pass. He may need to under lithotripsy but they are waiting to see if the stone will pass on its own. Due to this, he did not start Ozempic right away. He has used it twice and he will administer his third 0.25 mg dose tomorrow. He doesn't feel like he has had any adverse effects. He has had a lot of nausea and fatigue but he attributes it to pain, not to Ozempic. He also started the topical testosterone but again, he has not noticed an improvement in fatigue. Whether this is due to the medication not working or to the kidney stone, is unknown.    Ken has been struggling with fatigue and weight gain for the last 3 years. He feels like some of his worsening fatigue and weight gain was due to recurrent COVID infections. Whatever the cause, he just has not been able to get back on track. He was officially diagnosed with diabetes in March of this year. At the time of diagnosis we started him on metformin. He didn't feel like the metformin did much for him. He continued to feel fatigued and it didn't have any impact on his weight. We then decided to switch to Ozempic. He was also found to have low testosterone, which may have been contributing to fatigue. We started him on a topical testosterone in June.    He has not had any problems with severe hyperglycemia and/or hypoglycemia. He is struggling with nausea but it seems to be more connected to pain. He has noticed that his vision has worsened over the last year. He denies any problems with worsening numbness/tingling in his feet, however, he does complain over diffuse body aches and muscle weakness.    Endocrine relevant labs are as follows:   Latest Reference Range & Units 06/13/23 14:35   Hemoglobin A1C 0.0 - 5.6 % 6.3 (H)   (H): Data is abnormally high     Latest Reference Range & Units 03/28/23 14:59   Testosterone Total 240 - 950 ng/dL 193 (L)   (L): Data is abnormally low    REVIEW OF SYSTEMS  Answers for HPI/ROS submitted by the  patient on 7/18/2023  General Symptoms: No  Skin Symptoms: No  HENT Symptoms: No  EYE SYMPTOMS: No  HEART SYMPTOMS: No  LUNG SYMPTOMS: No  INTESTINAL SYMPTOMS: Yes  URINARY SYMPTOMS: Yes  REPRODUCTIVE SYMPTOMS: No  SKELETAL SYMPTOMS: No  BLOOD SYMPTOMS: No  NERVOUS SYSTEM SYMPTOMS: No  MENTAL HEALTH SYMPTOMS: No  Heart burn or indigestion: No  Nausea: Yes  Vomiting: Yes  Abdominal pain: Yes  Bloating: No  Constipation: Yes  Diarrhea: Yes  Blood in stool: No  Black stools: No  Rectal or Anal pain: No  Fecal incontinence: No  Yellowing of skin or eyes: No  Vomit with blood: No  Change in stools: No  Trouble holding urine or incontinence: No  Pain or burning: Yes  Trouble starting or stopping: No  Increased frequency of urination: No  Blood in urine: Yes  Decreased frequency of urination: No  Frequent nighttime urination: No  Flank pain: Yes  Difficulty emptying bladder: No    Endocrine: positive for diabetes and hypogonadism  Skin: negative  Eyes: positive for visual blurring, negative for, redness, tearing, itching  Ears/Nose/Throat: negative for, nasal congestion, persistent sore throat, hoarseness  Respiratory: No shortness of breath, dyspnea on exertion, cough, or hemoptysis  Cardiovascular: negative for, chest pain, dyspnea on exertion and exercise intolerance  Gastrointestinal: positive for nausea, negative for, vomiting, constipation and diarrhea  Genitourinary: negative for, nocturia, dysuria, frequency and urgency  Musculoskeletal: positive for muscular weakness, negative for and nocturnal cramping  Neurologic: negative for, numbness or tingling of feet and tremor  Psychiatric: negative  Hematologic/Lymphatic/Immunologic: negative    Past Medical History  Past Medical History:   Diagnosis Date     Cervicalgia     Non-specific Cervical Spine Pain     Lumbago     Mechanical Low Back Pain     Muscular wasting and disuse atrophy, not elsewhere classified     Deconditioning Syndrome     Unspecified chronic  suppurative otitis media        Medications  Current Outpatient Medications   Medication Sig Dispense Refill     albuterol (PROAIR HFA/PROVENTIL HFA/VENTOLIN HFA) 108 (90 Base) MCG/ACT inhaler Inhale 2 puffs into the lungs every 6 hours as needed for shortness of breath, wheezing or cough 18 g 0     HYDROcodone-acetaminophen (NORCO) 5-325 MG tablet TAKE 1 TO 2 TABLETS BY MOUTH EVERY 4 HOURS AS NEEDED FOR PAIN.       losartan (COZAAR) 25 MG tablet Take 1 tablet (25 mg) by mouth daily 90 tablet 0     multivitamin w/minerals (MULTIVITAMIN, THERAPEUTIC WITH MINERALS) tablet Take 1 tablet by mouth daily       ondansetron (ZOFRAN) 8 MG tablet Take 8 mg by mouth every 8 hours as needed       oxyCODONE (ROXICODONE) 5 MG tablet Take 5-10 mg by mouth every 4 hours as needed       semaglutide (OZEMPIC) 2 MG/3ML pen Inject 0.25 mg Subcutaneous every 7 days 3 mL 1     STATIN NOT PRESCRIBED (INTENTIONAL) Please choose reason not prescribed from choices below.       tamsulosin (FLOMAX) 0.4 MG capsule Take 0.4 mg by mouth daily       testosterone (ANDROGEL 1.62 % PUMP) 20.25 MG/ACT gel Place 40.5 mg onto the skin daily Apply from dispenser to clean, dry, intact skin of the upper arms and shoulders. 75 g 0     Continuous Blood Gluc Sensor (FREESTYLE LUKE 2 SENSOR) MISC Change every 14 days. 6 each 1       Allergies  No Known Allergies      Family History  family history includes Diabetes in his father; Neuropathy in his father; No Known Problems in his brother and sister; Obesity in his brother; Pancreatic Cancer in his mother.    Social History  Social History     Tobacco Use     Smoking status: Never     Smokeless tobacco: Never   Vaping Use     Vaping Use: Never used   Substance Use Topics     Alcohol use: Yes     Comment: Alcoholic Drinks/day: rare     Drug use: Never       Physical Exam  /85 (BP Location: Left arm, Patient Position: Chair, Cuff Size: Adult Large)   Pulse 76   Temp 98  F (36.7  C) (Tympanic)   Resp 16   " Ht 1.791 m (5' 10.51\")   Wt 131 kg (288 lb 14.4 oz)   SpO2 98%   BMI 40.85 kg/m    Body mass index is 40.85 kg/m .  GENERAL :  In no apparent distress  SKIN: Normal color, normal temperature, texture.  No hirsutism, alopecia or purple striae.     EYES: PERRL, EOMI, No scleral icterus,  No proptosis, conjunctival redness, stare, retraction  NEURO: awake, alert, responds appropriately to questions.  Cranial nerves intact.   Moves all extremities; Gait normal.  No tremor of the outstretched hand.    EXTREMITIES: No clubbing, cyanosis or edema.    DATA REVIEW  Pt is not monitoring blood sugar              Again, thank you for allowing me to participate in the care of your patient.        Sincerely,        Barbara Jimenes PA-C    "

## 2023-08-08 DIAGNOSIS — R79.89 LOW TESTOSTERONE: ICD-10-CM

## 2023-08-29 ENCOUNTER — PATIENT OUTREACH (OUTPATIENT)
Dept: CARE COORDINATION | Facility: CLINIC | Age: 58
End: 2023-08-29
Payer: COMMERCIAL

## 2023-09-05 DIAGNOSIS — I10 BENIGN ESSENTIAL HYPERTENSION: ICD-10-CM

## 2023-09-05 DIAGNOSIS — R79.89 LOW TESTOSTERONE: ICD-10-CM

## 2023-09-12 ENCOUNTER — PATIENT OUTREACH (OUTPATIENT)
Dept: CARE COORDINATION | Facility: CLINIC | Age: 58
End: 2023-09-12
Payer: COMMERCIAL

## 2023-09-12 RX ORDER — LOSARTAN POTASSIUM 25 MG/1
25 TABLET ORAL DAILY
Qty: 90 TABLET | Refills: 0 | Status: SHIPPED | OUTPATIENT
Start: 2023-09-12 | End: 2024-02-19

## 2023-09-12 RX ORDER — TESTOSTERONE 1.62 MG/G
40.5 GEL TRANSDERMAL DAILY
Qty: 75 G | Refills: 1 | Status: SHIPPED | OUTPATIENT
Start: 2023-09-12 | End: 2024-01-02

## 2023-09-12 NOTE — TELEPHONE ENCOUNTER
"Requested Prescriptions   Pending Prescriptions Disp Refills    losartan (COZAAR) 25 MG tablet [Pharmacy Med Name: losartan 25 mg tablet] 90 tablet 0     Sig: Take 1 tablet (25 mg) by mouth daily       Angiotensin-II Receptors Passed - 9/12/2023  8:26 AM        Passed - Last blood pressure under 140/90 in past 12 months     BP Readings from Last 3 Encounters:   07/18/23 137/85   06/13/23 (!) 145/90   06/05/23 (!) 152/104                 Passed - Recent (12 mo) or future (30 days) visit within the authorizing provider's specialty     Patient has had an office visit with the authorizing provider or a provider within the authorizing providers department within the previous 12 mos or has a future within next 30 days. See \"Patient Info\" tab in inbasket, or \"Choose Columns\" in Meds & Orders section of the refill encounter.              Passed - Medication is active on med list        Passed - Patient is age 18 or older        Passed - Normal serum creatinine on file in past 12 months     Recent Labs   Lab Test 06/13/23  1435   CR 1.09       Ok to refill medication if creatinine is low          Passed - Normal serum potassium on file in past 12 months     Recent Labs   Lab Test 06/13/23  1435   POTASSIUM 4.6                      testosterone (ANDROGEL 1.62 % PUMP) 20.25 MG/ACT gel 75 g 0     Sig: Place 40.5 mg onto the skin daily Apply from dispenser to clean, dry, intact skin of the upper arms and shoulders.       Androgen Agents Failed - 9/12/2023  8:26 AM        Failed - Refills for this classification require provider review        Passed - Patient is of age 12 and older        Passed - Lipid panel on file in past 12 mos     Recent Labs   Lab Test 06/13/23  1435   CHOL 184   TRIG 173*   HDL 33*   *   NHDL 151*               Passed - ALT on file within past 12 mos     Recent Labs   Lab Test 03/28/23  1459   ALT 77*             Passed - Medication is active on med list        Passed - HCT less than 54% on file " within past 12 mos     Recent Labs   Lab Test 03/28/23  1459   HCT 42.4             Passed - Serum Testosterone on file within past 12 mos     Recent Labs   Lab Test 03/28/23  1459   TESTOSTTOTAL 193*             Passed - Serum PSA on file within past 12 mos     Lab Results   Component Value Date    PSA 0.62 09/28/2022    PSA 0.4 06/03/2021             Passed - Blood pressure under 140/90 in past 6 months     BP Readings from Last 3 Encounters:   07/18/23 137/85   06/13/23 (!) 145/90   06/05/23 (!) 152/104                 Passed - Patient is not pregnant        Passed - No positive pregnancy test on file within past 12 mos        Passed - Recent (6 mo) or future (30 days) visit within the authorizing provider's specialty        Passed - AST on file within past 12 mos     Recent Labs   Lab Test 03/28/23  1459   AST 44

## 2023-09-18 RX ORDER — METFORMIN HCL 500 MG
500 TABLET, EXTENDED RELEASE 24 HR ORAL 2 TIMES DAILY WITH MEALS
Qty: 180 TABLET | Refills: 0 | OUTPATIENT
Start: 2023-09-18

## 2023-10-10 RX ORDER — TESTOSTERONE 20.25 MG/1.25G
GEL TOPICAL
Qty: 75 G | Refills: 0 | OUTPATIENT
Start: 2023-10-10

## 2023-10-23 ENCOUNTER — OFFICE VISIT (OUTPATIENT)
Dept: ENDOCRINOLOGY | Facility: CLINIC | Age: 58
End: 2023-10-23
Payer: COMMERCIAL

## 2023-10-23 VITALS
HEIGHT: 70 IN | OXYGEN SATURATION: 97 % | WEIGHT: 280 LBS | DIASTOLIC BLOOD PRESSURE: 70 MMHG | SYSTOLIC BLOOD PRESSURE: 138 MMHG | BODY MASS INDEX: 40.09 KG/M2 | HEART RATE: 83 BPM

## 2023-10-23 DIAGNOSIS — R79.89 LOW TESTOSTERONE: ICD-10-CM

## 2023-10-23 DIAGNOSIS — R53.83 OTHER FATIGUE: ICD-10-CM

## 2023-10-23 DIAGNOSIS — E11.65 TYPE 2 DIABETES MELLITUS WITH HYPERGLYCEMIA, WITHOUT LONG-TERM CURRENT USE OF INSULIN (H): Primary | ICD-10-CM

## 2023-10-23 LAB
ANION GAP SERPL CALCULATED.3IONS-SCNC: 12 MMOL/L (ref 7–15)
BASOPHILS # BLD AUTO: 0.1 10E3/UL (ref 0–0.2)
BASOPHILS NFR BLD AUTO: 1 %
BUN SERPL-MCNC: 19.9 MG/DL (ref 8–23)
CALCIUM SERPL-MCNC: 9.4 MG/DL (ref 8.6–10)
CHLORIDE SERPL-SCNC: 102 MMOL/L (ref 98–107)
CREAT SERPL-MCNC: 1.24 MG/DL (ref 0.67–1.17)
DEPRECATED HCO3 PLAS-SCNC: 26 MMOL/L (ref 22–29)
EGFRCR SERPLBLD CKD-EPI 2021: 67 ML/MIN/1.73M2
EOSINOPHIL # BLD AUTO: 0.2 10E3/UL (ref 0–0.7)
EOSINOPHIL NFR BLD AUTO: 2 %
ERYTHROCYTE [DISTWIDTH] IN BLOOD BY AUTOMATED COUNT: 12.5 % (ref 10–15)
GLUCOSE SERPL-MCNC: 90 MG/DL (ref 70–99)
HBA1C MFR BLD: 5.7 % (ref 0–5.6)
HCT VFR BLD AUTO: 43.9 % (ref 40–53)
HGB BLD-MCNC: 15.4 G/DL (ref 13.3–17.7)
IMM GRANULOCYTES # BLD: 0 10E3/UL
IMM GRANULOCYTES NFR BLD: 0 %
LYMPHOCYTES # BLD AUTO: 1.8 10E3/UL (ref 0.8–5.3)
LYMPHOCYTES NFR BLD AUTO: 24 %
MCH RBC QN AUTO: 31.3 PG (ref 26.5–33)
MCHC RBC AUTO-ENTMCNC: 35.1 G/DL (ref 31.5–36.5)
MCV RBC AUTO: 89 FL (ref 78–100)
MONOCYTES # BLD AUTO: 0.7 10E3/UL (ref 0–1.3)
MONOCYTES NFR BLD AUTO: 10 %
NEUTROPHILS # BLD AUTO: 4.6 10E3/UL (ref 1.6–8.3)
NEUTROPHILS NFR BLD AUTO: 63 %
PLATELET # BLD AUTO: 174 10E3/UL (ref 150–450)
POTASSIUM SERPL-SCNC: 4.7 MMOL/L (ref 3.4–5.3)
RBC # BLD AUTO: 4.92 10E6/UL (ref 4.4–5.9)
SODIUM SERPL-SCNC: 140 MMOL/L (ref 135–145)
WBC # BLD AUTO: 7.3 10E3/UL (ref 4–11)

## 2023-10-23 PROCEDURE — 99214 OFFICE O/P EST MOD 30 MIN: CPT | Performed by: PHYSICIAN ASSISTANT

## 2023-10-23 PROCEDURE — 84270 ASSAY OF SEX HORMONE GLOBUL: CPT | Performed by: PHYSICIAN ASSISTANT

## 2023-10-23 PROCEDURE — 83036 HEMOGLOBIN GLYCOSYLATED A1C: CPT | Performed by: PHYSICIAN ASSISTANT

## 2023-10-23 PROCEDURE — 85025 COMPLETE CBC W/AUTO DIFF WBC: CPT | Performed by: PHYSICIAN ASSISTANT

## 2023-10-23 PROCEDURE — 36415 COLL VENOUS BLD VENIPUNCTURE: CPT | Performed by: PHYSICIAN ASSISTANT

## 2023-10-23 PROCEDURE — 80048 BASIC METABOLIC PNL TOTAL CA: CPT | Performed by: PHYSICIAN ASSISTANT

## 2023-10-23 PROCEDURE — 84403 ASSAY OF TOTAL TESTOSTERONE: CPT | Performed by: PHYSICIAN ASSISTANT

## 2023-10-23 NOTE — PROGRESS NOTES
Assessment/Plan :   Type 2 DM. Ken remains stable on Ozempic 0.5 mg weekly. He is frustrated by the slowing of his weight loss. We discussed options and I will send in a new prescription for 1 mg weekly. We reviewed the possible adverse effects and if he has any problems, he is to reach out to my office. He is also due for routine laboratory testing, I will place an order today. I will contact him with the results. We will follow-up in 3-6 mos.  Low testosterone. Ken was doing a great job at applying the topical testosterone but, recently, he has not been as consistent. I would like to check a level today, to see if the total testosterone has increased. I would also like to check medication safety with a CBC. I will contact him with the results and we will follow-up in 3-6 mos.      I have independently reviewed and interpreted labs, imaging as indicated.      Chief complaint:  Juanito is a 58 year old male who returns for follow-up of Type 2 DM and low testosterone.    I have reviewed Care Everywhere including [facility], [facility], [facility], [facility], [facility] , [facility], [place] lab reports, imaging reports and provider notes as indicated.      HISTORY OF PRESENT ILLNESS  Ken remains stable on his current medications. He feels like he is doing okay. He has been able to lose weight on the Ozempic but this seems to be slowing. He has continued to take 0.5 mg weekly. He was told that he may need to titrate the dose up, for more weight loss. He has not been monitoring his blood sugars because he could not get the Gio approved. He feels like his blood sugars are stable but he is not sure.    Ken has been struggling with fatigue and weight gain for at least 3 yrs. He was previously diagnosed with pre-diabetes and then his A1C increased to the diabetic range. He has also been found to have low testosterone. We started him on topical testosterone replacement in June of 2023. He is not sure  that it has made a significant impact. He has noticed an increase in energy but he still struggles to get everything that needs to be done, done.    He has no history of severe hyperglycemia and/or hypoglycemia. He also denies any problems with blurred vision or numbness/tingling in his feet. He has been struggling with general body aches and pains. He also has a history of kidney stones.    Endocrine relevant labs are as follows:   Latest Reference Range & Units 06/13/23 14:35   Hemoglobin A1C 0.0 - 5.6 % 6.3 (H)   (H): Data is abnormally high   Latest Reference Range & Units 03/28/23 14:59   Hemoglobin A1C 0.0 - 5.6 % 7.6 (H)   (H): Data is abnormally high   Latest Reference Range & Units 03/28/23 14:59   Testosterone Total 240 - 950 ng/dL 193 (L)   (L): Data is abnormally low  REVIEW OF SYSTEMS    Endocrine: positive for diabetes and low testosterone  Skin: negative  Eyes: negative for, visual blurring, redness, tearing  Ears/Nose/Throat: negative for, persistent sore throat, hoarseness  Respiratory: No shortness of breath, dyspnea on exertion, cough, or hemoptysis  Cardiovascular: negative for, chest pain, dyspnea on exertion, lower extremity edema, and exercise intolerance  Gastrointestinal: positive for constipation, negative for, nausea, vomiting, and diarrhea  Genitourinary: negative for, nocturia, dysuria, frequency, and urgency  Musculoskeletal: negative for, muscular weakness, nocturnal cramping, and foot pain  Neurologic: negative for, local weakness, numbness or tingling of hands, and numbness or tingling of feet  Psychiatric: negative  Hematologic/Lymphatic/Immunologic: negative    Past Medical History  Past Medical History:   Diagnosis Date    Cervicalgia     Non-specific Cervical Spine Pain    Lumbago     Mechanical Low Back Pain    Muscular wasting and disuse atrophy, not elsewhere classified     Deconditioning Syndrome    Unspecified chronic suppurative otitis media        Medications  Current  "Outpatient Medications   Medication Sig Dispense Refill    albuterol (PROAIR HFA/PROVENTIL HFA/VENTOLIN HFA) 108 (90 Base) MCG/ACT inhaler Inhale 2 puffs into the lungs every 6 hours as needed for shortness of breath, wheezing or cough 18 g 0    Continuous Blood Gluc Sensor (FREESTYLE LUKE 2 SENSOR) MISC Change every 14 days. 6 each 1    HYDROcodone-acetaminophen (NORCO) 5-325 MG tablet TAKE 1 TO 2 TABLETS BY MOUTH EVERY 4 HOURS AS NEEDED FOR PAIN.      losartan (COZAAR) 25 MG tablet Take 1 tablet (25 mg) by mouth daily 90 tablet 0    multivitamin w/minerals (MULTIVITAMIN, THERAPEUTIC WITH MINERALS) tablet Take 1 tablet by mouth daily      ondansetron (ZOFRAN) 8 MG tablet Take 8 mg by mouth every 8 hours as needed      oxyCODONE (ROXICODONE) 5 MG tablet Take 5-10 mg by mouth every 4 hours as needed      Semaglutide, 1 MG/DOSE, (OZEMPIC) 4 MG/3ML pen Inject 1 mg Subcutaneous every 7 days 3 mL 3    STATIN NOT PRESCRIBED (INTENTIONAL) Please choose reason not prescribed from choices below.      tamsulosin (FLOMAX) 0.4 MG capsule Take 0.4 mg by mouth daily      testosterone (ANDROGEL 1.62 % PUMP) 20.25 MG/ACT gel Place 40.5 mg onto the skin daily Apply from dispenser to clean, dry, intact skin of the upper arms and shoulders. 75 g 1       Allergies  No Known Allergies      Family History  family history includes Diabetes in his father; Neuropathy in his father; No Known Problems in his brother and sister; Obesity in his brother; Pancreatic Cancer in his mother.    Social History  Social History     Tobacco Use    Smoking status: Never    Smokeless tobacco: Never   Vaping Use    Vaping Use: Never used   Substance Use Topics    Alcohol use: Yes     Comment: Alcoholic Drinks/day: rare    Drug use: Never       Physical Exam  /70 (BP Location: Left arm, Patient Position: Left side, Cuff Size: Adult Large)   Pulse 83   Ht 1.778 m (5' 10\")   Wt 127 kg (280 lb)   SpO2 97%   BMI 40.18 kg/m    Body mass index is 40.18 " kg/m .  GENERAL :  In no apparent distress  SKIN: Normal color, normal temperature, texture.  No hirsutism, alopecia or purple striae.     EYES: PERRL, EOMI, No scleral icterus,  No proptosis, conjunctival redness, stare, retraction  NEURO: awake, alert, responds appropriately to questions.  Cranial nerves intact.   Moves all extremities; Gait normal.  No tremor of the outstretched hand.    EXTREMITIES: No clubbing, cyanosis or edema.    DATA REVIEW  Pt was not able to get the Gio approved

## 2023-10-23 NOTE — LETTER
10/23/2023         RE: Juanito Delacruz  [id number] [facility] [id number]        Dear Colleague,    Thank you for referring your patient, Juanito Delacruz, to the [facility]. Please see a copy of my visit note below.    Assessment/Plan :   Type 2 DM. Ken remains stable on Ozempic 0.5 mg weekly. He is frustrated by the slowing of his weight loss. We discussed options and I will send in a new prescription for 1 mg weekly. We reviewed the possible adverse effects and if he has any problems, he is to reach out to my office. He is also due for routine laboratory testing, I will place an order today. I will contact him with the results. We will follow-up in 3-6 mos.  Low testosterone. Kne was doing a great job at applying the topical testosterone but, recently, he has not been as consistent. I would like to check a level today, to see if the total testosterone has increased. I would also like to check medication safety with a CBC. I will contact him with the results and we will follow-up in 3-6 mos.      I have independently reviewed and interpreted labs, imaging as indicated.      Chief complaint:  Juanito is a 58 year old male who returns for follow-up of Type 2 DM and low testosterone.    I have reviewed Care Everywhere including [facility], [facility], [facility], [facility] , [facility], [facility] lab reports, imaging reports and provider notes as indicated.      HISTORY OF PRESENT ILLNESS  Ken remains stable on his current medications. He feels like he is doing okay. He has been able to lose weight on the Ozempic but this seems to be slowing. He has continued to take 0.5 mg weekly. He was told that he may need to titrate the dose up, for more weight loss. He has not been monitoring his blood sugars because he could not get the Gio approved. He feels like his blood sugars are stable but he is not sure.    Ken has been struggling with fatigue and weight  gain for at least 3 yrs. He was previously diagnosed with pre-diabetes and then his A1C increased to the diabetic range. He has also been found to have low testosterone. We started him on topical testosterone replacement in June of 2023. He is not sure that it has made a significant impact. He has noticed an increase in energy but he still struggles to get everything that needs to be done, done.    He has no history of severe hyperglycemia and/or hypoglycemia. He also denies any problems with blurred vision or numbness/tingling in his feet. He has been struggling with general body aches and pains. He also has a history of kidney stones.    Endocrine relevant labs are as follows:   Latest Reference Range & Units 06/13/23 14:35   Hemoglobin A1C 0.0 - 5.6 % 6.3 (H)   (H): Data is abnormally high   Latest Reference Range & Units 03/28/23 14:59   Hemoglobin A1C 0.0 - 5.6 % 7.6 (H)   (H): Data is abnormally high   Latest Reference Range & Units 03/28/23 14:59   Testosterone Total 240 - 950 ng/dL 193 (L)   (L): Data is abnormally low  REVIEW OF SYSTEMS    Endocrine: positive for diabetes and low testosterone  Skin: negative  Eyes: negative for, visual blurring, redness, tearing  Ears/Nose/Throat: negative for, persistent sore throat, hoarseness  Respiratory: No shortness of breath, dyspnea on exertion, cough, or hemoptysis  Cardiovascular: negative for, chest pain, dyspnea on exertion, lower extremity edema, and exercise intolerance  Gastrointestinal: positive for constipation, negative for, nausea, vomiting, and diarrhea  Genitourinary: negative for, nocturia, dysuria, frequency, and urgency  Musculoskeletal: negative for, muscular weakness, nocturnal cramping, and foot pain  Neurologic: negative for, local weakness, numbness or tingling of hands, and numbness or tingling of feet  Psychiatric: negative  Hematologic/Lymphatic/Immunologic: negative    Past Medical History  Past Medical History:   Diagnosis Date      Cervicalgia     Non-specific Cervical Spine Pain     Lumbago     Mechanical Low Back Pain     Muscular wasting and disuse atrophy, not elsewhere classified     Deconditioning Syndrome     Unspecified chronic suppurative otitis media        Medications  Current Outpatient Medications   Medication Sig Dispense Refill     albuterol (PROAIR HFA/PROVENTIL HFA/VENTOLIN HFA) 108 (90 Base) MCG/ACT inhaler Inhale 2 puffs into the lungs every 6 hours as needed for shortness of breath, wheezing or cough 18 g 0     Continuous Blood Gluc Sensor (FREESTYLE LUKE 2 SENSOR) MISC Change every 14 days. 6 each 1     HYDROcodone-acetaminophen (NORCO) 5-325 MG tablet TAKE 1 TO 2 TABLETS BY MOUTH EVERY 4 HOURS AS NEEDED FOR PAIN.       losartan (COZAAR) 25 MG tablet Take 1 tablet (25 mg) by mouth daily 90 tablet 0     multivitamin w/minerals (MULTIVITAMIN, THERAPEUTIC WITH MINERALS) tablet Take 1 tablet by mouth daily       ondansetron (ZOFRAN) 8 MG tablet Take 8 mg by mouth every 8 hours as needed       oxyCODONE (ROXICODONE) 5 MG tablet Take 5-10 mg by mouth every 4 hours as needed       Semaglutide, 1 MG/DOSE, (OZEMPIC) 4 MG/3ML pen Inject 1 mg Subcutaneous every 7 days 3 mL 3     STATIN NOT PRESCRIBED (INTENTIONAL) Please choose reason not prescribed from choices below.       tamsulosin (FLOMAX) 0.4 MG capsule Take 0.4 mg by mouth daily       testosterone (ANDROGEL 1.62 % PUMP) 20.25 MG/ACT gel Place 40.5 mg onto the skin daily Apply from dispenser to clean, dry, intact skin of the upper arms and shoulders. 75 g 1       Allergies  No Known Allergies      Family History  family history includes Diabetes in his father; Neuropathy in his father; No Known Problems in his brother and sister; Obesity in his brother; Pancreatic Cancer in his mother.    Social History  Social History     Tobacco Use     Smoking status: Never     Smokeless tobacco: Never   Vaping Use     Vaping Use: Never used   Substance Use Topics     Alcohol use: Yes      "Comment: Alcoholic Drinks/day: rare     Drug use: Never       Physical Exam  /70 (BP Location: Left arm, Patient Position: Left side, Cuff Size: Adult Large)   Pulse 83   Ht 1.778 m (5' 10\")   Wt 127 kg (280 lb)   SpO2 97%   BMI 40.18 kg/m    Body mass index is 40.18 kg/m .  GENERAL :  In no apparent distress  SKIN: Normal color, normal temperature, texture.  No hirsutism, alopecia or purple striae.     EYES: PERRL, EOMI, No scleral icterus,  No proptosis, conjunctival redness, stare, retraction  NEURO: awake, alert, responds appropriately to questions.  Cranial nerves intact.   Moves all extremities; Gait normal.  No tremor of the outstretched hand.    EXTREMITIES: No clubbing, cyanosis or edema.    DATA REVIEW  Pt was not able to get the Gio approved        Again, thank you for allowing me to participate in the care of your patient.        Sincerely,        Barbara Jimenes PA-C  "

## 2023-10-24 LAB — SHBG SERPL-SCNC: 28 NMOL/L (ref 11–80)

## 2023-10-25 LAB
TESTOST FREE SERPL-MCNC: 6.03 NG/DL
TESTOST SERPL-MCNC: 281 NG/DL (ref 240–950)

## 2023-11-06 RX ORDER — TESTOSTERONE 20.25 MG/1.25G
GEL TOPICAL
Qty: 75 G | Refills: 0 | OUTPATIENT
Start: 2023-11-06

## 2023-11-09 ENCOUNTER — HOSPITAL ENCOUNTER (OUTPATIENT)
Dept: CT IMAGING | Facility: HOSPITAL | Age: 58
Discharge: HOME | End: 2023-11-09
Attending: UROLOGY | Admitting: UROLOGY
Payer: COMMERCIAL

## 2023-11-09 DIAGNOSIS — N20.1 CALCULUS OF URETER: ICD-10-CM

## 2023-11-09 PROCEDURE — 74176 CT ABD & PELVIS W/O CONTRAST: CPT

## 2023-11-19 ENCOUNTER — HEALTH MAINTENANCE LETTER (OUTPATIENT)
Age: 58
End: 2023-11-19

## 2023-12-01 ENCOUNTER — OFFICE VISIT (OUTPATIENT)
Dept: FAMILY MEDICINE | Facility: CLINIC | Age: 58
End: 2023-12-01
Payer: COMMERCIAL

## 2023-12-01 VITALS
HEART RATE: 86 BPM | RESPIRATION RATE: 18 BRPM | BODY MASS INDEX: 39.65 KG/M2 | HEIGHT: 70 IN | SYSTOLIC BLOOD PRESSURE: 144 MMHG | DIASTOLIC BLOOD PRESSURE: 86 MMHG | OXYGEN SATURATION: 96 % | WEIGHT: 277 LBS | TEMPERATURE: 98.1 F

## 2023-12-01 DIAGNOSIS — G47.33 OSA ON CPAP: ICD-10-CM

## 2023-12-01 DIAGNOSIS — E11.9 TYPE 2 DIABETES MELLITUS WITHOUT COMPLICATION, WITHOUT LONG-TERM CURRENT USE OF INSULIN (H): ICD-10-CM

## 2023-12-01 DIAGNOSIS — N20.1 URETERAL CALCULI: ICD-10-CM

## 2023-12-01 DIAGNOSIS — Z01.818 PRE-OP EVALUATION: Primary | ICD-10-CM

## 2023-12-01 DIAGNOSIS — K11.8 PAROTID NODULE: ICD-10-CM

## 2023-12-01 PROBLEM — E11.10 DIABETIC KETOACIDOSIS WITHOUT COMA ASSOCIATED WITH TYPE 2 DIABETES MELLITUS (H): Status: RESOLVED | Noted: 2023-03-30 | Resolved: 2023-12-01

## 2023-12-01 PROBLEM — R73.01 IMPAIRED FASTING GLUCOSE: Status: RESOLVED | Noted: 2021-06-04 | Resolved: 2023-12-01

## 2023-12-01 LAB
ERYTHROCYTE [DISTWIDTH] IN BLOOD BY AUTOMATED COUNT: 12.4 % (ref 10–15)
HCT VFR BLD AUTO: 44.3 % (ref 40–53)
HGB BLD-MCNC: 15.7 G/DL (ref 13.3–17.7)
MCH RBC QN AUTO: 31.5 PG (ref 26.5–33)
MCHC RBC AUTO-ENTMCNC: 35.4 G/DL (ref 31.5–36.5)
MCV RBC AUTO: 89 FL (ref 78–100)
PLATELET # BLD AUTO: 192 10E3/UL (ref 150–450)
RBC # BLD AUTO: 4.99 10E6/UL (ref 4.4–5.9)
WBC # BLD AUTO: 8 10E3/UL (ref 4–11)

## 2023-12-01 PROCEDURE — 99213 OFFICE O/P EST LOW 20 MIN: CPT | Mod: 25 | Performed by: FAMILY MEDICINE

## 2023-12-01 PROCEDURE — 93005 ELECTROCARDIOGRAM TRACING: CPT | Performed by: FAMILY MEDICINE

## 2023-12-01 PROCEDURE — 80048 BASIC METABOLIC PNL TOTAL CA: CPT | Performed by: FAMILY MEDICINE

## 2023-12-01 PROCEDURE — 36415 COLL VENOUS BLD VENIPUNCTURE: CPT | Performed by: FAMILY MEDICINE

## 2023-12-01 PROCEDURE — 85027 COMPLETE CBC AUTOMATED: CPT | Performed by: FAMILY MEDICINE

## 2023-12-01 NOTE — H&P (VIEW-ONLY)
[address] [address]  Phone: [phone]  Fax: [phone]  Primary Provider: Archie Mosher  Pre-op Performing Provider: NADER STONE      PREOPERATIVE EVALUATION:  Today's date: 12/1/2023    Ken is a 58 year old, presenting for the following:    -Pre-Op Exam (CYSTOSCOPY, RIGHT RETROGRADE PYELOGRAM, RIGHT URETEROSCOPY WITH LASER LITHOTRIPSY, RIGHT URETERAL STENT PLACEMENT on 12/12/23.     -RIGHT SUPERFICIAL PAROTIDECTOMY on 12/26/23) due to a nodule         12/1/2023    11:44 AM   Additional Questions   Roomed by Jake Na   Accompanied by N/A       Surgical Information:  Surgery/Procedure: CYSTOSCOPY, RIGHT RETROGRADE PYELOGRAM, RIGHT URETEROSCOPY WITH LASER LITHOTRIPSY, RIGHT URETERAL STENT PLACEMENT     -RIGHT SUPERFICIAL PAROTIDECTOMY on 12/26/23) due to a nodule     Surgery Location:  [facility]   Surgeon: Reginaldo Pritchett   Surgery Date: 12/12/23 and 12/26/23  Time of Surgery: 11:30AM  Where patient plans to recover: At home with family  Fax number for surgical facility: Note does not need to be faxed, will be available electronically in Epic.    Assessment & Plan     The proposed surgical procedure is considered LOW risk.    Problem List Items Addressed This Visit       JUAN on CPAP    Diabetes mellitus, type 2 (H)     Other Visit Diagnoses       Pre-op evaluation    -  Primary    Relevant Orders    REVIEW OF HEALTH MAINTENANCE PROTOCOL ORDERS (Completed)    EKG 12-lead, tracing only (Completed)    CBC with platelets    Basic metabolic panel    Ureteral calculi        Parotid nodule                        - No identified additional risk factors other than previously addressed    Antiplatelet or Anticoagulation Medication Instructions:   - Patient is on no antiplatelet or anticoagulation medications.    Additional Medication Instructions:  Patient is to take all scheduled medications on the day of  surgery    RECOMMENDATION:  APPROVAL GIVEN to proceed with proposed procedure, without further diagnostic evaluation.            Subjective         12/1/2023    11:42 AM   Preop Questions   1. Have you ever had a heart attack or stroke? No   2. Have you ever had surgery on your heart or blood vessels, such as a stent placement, a coronary artery bypass, or surgery on an artery in your head, neck, heart, or legs? No   3. Do you have chest pain with activity? No   4. Do you have a history of  heart failure? No   5. Do you currently have a cold, bronchitis or symptoms of other infection? No   6. Do you have a cough, shortness of breath, or wheezing? No   7. Do you or anyone in your family have previous history of blood clots? No   8. Do you or does anyone in your family have a serious bleeding problem such as prolonged bleeding following surgeries or cuts? No   9. Have you ever had problems with anemia or been told to take iron pills? No   10. Have you had any abnormal blood loss such as black, tarry or bloody stools? No   11. Have you ever had a blood transfusion? No   12. Are you willing to have a blood transfusion if it is medically needed before, during, or after your surgery? Yes   13. Have you or any of your relatives ever had problems with anesthesia? No   14. Do you have sleep apnea, excessive snoring or daytime drowsiness? YES -    14a. Do you have a CPAP machine? Yes   15. Do you have any artifical heart valves or other implanted medical devices like a pacemaker, defibrillator, or continuous glucose monitor? No   16. Do you have artificial joints? No   17. Are you allergic to latex? No       Preoperative Review of :   reviewed - no record of controlled substances prescribed.        Review of Systems  Constitutional, neuro, ENT, endocrine, pulmonary, cardiac, gastrointestinal, genitourinary, musculoskeletal, integument and psychiatric systems are negative, except as otherwise noted.    Patient Active  Problem List    Diagnosis Date Noted    Diabetes mellitus, type 2 (H) 03/30/2023     Priority: Medium    Diabetic ketoacidosis without coma associated with type 2 diabetes mellitus (H) 03/30/2023     Priority: Medium    Impaired fasting glucose 06/04/2021     Priority: Medium    Morbid obesity (H) 06/04/2021     Priority: Medium    History of colonic polyps 12/11/2019     Priority: Medium    JUAN on CPAP 12/11/2019     Priority: Medium    Fatty liver 06/28/2017     Priority: Medium    Cervicalgia 12/29/2005     Priority: Medium     Non-specific Cervical Spine Pain      Lumbago 12/29/2005     Priority: Medium     Mechanical Low Back Pain      Chronic suppurative otitis media 12/07/2005     Priority: Medium     Problem list name updated by automated process. Provider to review        Past Medical History:   Diagnosis Date    Cervicalgia     Non-specific Cervical Spine Pain    Lumbago     Mechanical Low Back Pain    Muscular wasting and disuse atrophy, not elsewhere classified     Deconditioning Syndrome    Unspecified chronic suppurative otitis media      Past Surgical History:   Procedure Laterality Date    NO PAST SURGERIES       Current Outpatient Medications   Medication Sig Dispense Refill    albuterol (PROAIR HFA/PROVENTIL HFA/VENTOLIN HFA) 108 (90 Base) MCG/ACT inhaler Inhale 2 puffs into the lungs every 6 hours as needed for shortness of breath, wheezing or cough 18 g 0    Continuous Blood Gluc Sensor (FREESTYLE LUKE 2 SENSOR) MISC Change every 14 days. 6 each 1    HYDROcodone-acetaminophen (NORCO) 5-325 MG tablet TAKE 1 TO 2 TABLETS BY MOUTH EVERY 4 HOURS AS NEEDED FOR PAIN.      ondansetron (ZOFRAN) 8 MG tablet Take 8 mg by mouth every 8 hours as needed      oxyCODONE (ROXICODONE) 5 MG tablet Take 5-10 mg by mouth every 4 hours as needed      Semaglutide, 1 MG/DOSE, (OZEMPIC) 4 MG/3ML pen Inject 1 mg Subcutaneous every 7 days 3 mL 3    testosterone (ANDROGEL 1.62 % PUMP) 20.25 MG/ACT gel Place 40.5 mg onto  "the skin daily Apply from dispenser to clean, dry, intact skin of the upper arms and shoulders. 75 g 1    losartan (COZAAR) 25 MG tablet Take 1 tablet (25 mg) by mouth daily (Patient not taking: Reported on 12/1/2023) 90 tablet 0    multivitamin w/minerals (MULTIVITAMIN, THERAPEUTIC WITH MINERALS) tablet Take 1 tablet by mouth daily (Patient not taking: Reported on 12/1/2023)      STATIN NOT PRESCRIBED (INTENTIONAL) Please choose reason not prescribed from choices below. (Patient not taking: Reported on 12/1/2023)      tamsulosin (FLOMAX) 0.4 MG capsule Take 0.4 mg by mouth daily (Patient not taking: Reported on 12/1/2023)         No Known Allergies     Social History     Tobacco Use    Smoking status: Never    Smokeless tobacco: Never   Substance Use Topics    Alcohol use: Yes     Comment: Alcoholic Drinks/day: rare       History   Drug Use Unknown         Objective     BP (!) 144/86 (BP Location: Right arm, Patient Position: Sitting, Cuff Size: Adult Regular)   Pulse 86   Temp 98.1  F (36.7  C) (Oral)   Resp 18   Ht 1.778 m (5' 10\")   Wt 125.6 kg (277 lb)   SpO2 96%   BMI 39.75 kg/m      Physical Exam    GENERAL APPEARANCE: healthy, alert and no distress     EYES: EOMI,  PERRL     HENT: oropharynx clear     NECK: no adenopathy, no asymmetry, masses, or scars and thyroid normal to palpation     RESP: lungs clear to auscultation - no rales, rhonchi or wheezes     CV: regular rates and rhythm, normal S1 S2, no S3 or S4 and no murmur, click or rub     ABDOMEN:  soft, nontender, no HSM or masses and bowel sounds normal     MS: extremities normal- no gross deformities noted, no evidence of inflammation in joints, FROM in all extremities.     SKIN: no suspicious lesions or rashes     NEURO: Normal strength and tone, sensory exam grossly normal, mentation intact and speech normal     PSYCH: mentation appears normal. and affect normal/bright     LYMPHATICS: No cervical adenopathy    Recent Labs   Lab Test " 10/23/23  1539 06/13/23  1435 06/08/23  1432 03/28/23  1459   HGB 15.4  --   --  14.9     --   --  153    139  --  144   POTASSIUM 4.7 4.6  --  4.2   CR 1.24* 1.09   < > 1.11   A1C 5.7* 6.3*  --  7.6*    < > = values in this interval not displayed.        Diagnostics:  Recent Results (from the past 720 hour(s))   EKG 12-lead, tracing only    Collection Time: 12/01/23 12:29 PM   Result Value Ref Range    Systolic Blood Pressure  mmHg    Diastolic Blood Pressure  mmHg    Ventricular Rate 76 BPM    Atrial Rate 76 BPM    NY Interval 150 ms    QRS Duration 92 ms     ms    QTc 411 ms    P Axis 32 degrees    R AXIS -1 degrees    T Axis 17 degrees    Interpretation ECG       Sinus rhythm  Normal ECG  No previous ECGs available  Confirmed by JOHNNIE MELLO MD LOC: ([id number]) on 12/2/2023 11:54:36 AM     CBC with platelets    Collection Time: 12/01/23 12:57 PM   Result Value Ref Range    WBC Count 8.0 4.0 - 11.0 10e3/uL    RBC Count 4.99 4.40 - 5.90 10e6/uL    Hemoglobin 15.7 13.3 - 17.7 g/dL    Hematocrit 44.3 40.0 - 53.0 %    MCV 89 78 - 100 fL    MCH 31.5 26.5 - 33.0 pg    MCHC 35.4 31.5 - 36.5 g/dL    RDW 12.4 10.0 - 15.0 %    Platelet Count 192 150 - 450 10e3/uL   Basic metabolic panel    Collection Time: 12/01/23 12:57 PM   Result Value Ref Range    Sodium 137 135 - 145 mmol/L    Potassium 4.2 3.4 - 5.3 mmol/L    Chloride 101 98 - 107 mmol/L    Carbon Dioxide (CO2) 27 22 - 29 mmol/L    Anion Gap 9 7 - 15 mmol/L    Urea Nitrogen 15.0 8.0 - 23.0 mg/dL    Creatinine 1.07 0.67 - 1.17 mg/dL    GFR Estimate 80 >60 mL/min/1.73m2    Calcium 9.1 8.6 - 10.0 mg/dL    Glucose 117 (H) 70 - 99 mg/dL          Revised Cardiac Risk Index (RCRI):  The patient has the following serious cardiovascular risks for perioperative complications:   - No serious cardiac risks = 0 points     RCRI Interpretation: 0 points: Class I (very low risk - 0.4% complication rate)         Signed Electronically by: Odessa Hull MD  Copy  of this evaluation report is provided to requesting physician.

## 2023-12-01 NOTE — PROGRESS NOTES
[address] [address]  Phone: [phone]  Fax: [phone]  Primary Provider: Archie Mosher  Pre-op Performing Provider: NADER STONE      PREOPERATIVE EVALUATION:  Today's date: 12/1/2023    Ken is a 58 year old, presenting for the following:    -Pre-Op Exam (CYSTOSCOPY, RIGHT RETROGRADE PYELOGRAM, RIGHT URETEROSCOPY WITH LASER LITHOTRIPSY, RIGHT URETERAL STENT PLACEMENT on 12/12/23.     -RIGHT SUPERFICIAL PAROTIDECTOMY on 12/26/23) due to a nodule         12/1/2023    11:44 AM   Additional Questions   Roomed by Jake Na   Accompanied by N/A       Surgical Information:  Surgery/Procedure: CYSTOSCOPY, RIGHT RETROGRADE PYELOGRAM, RIGHT URETEROSCOPY WITH LASER LITHOTRIPSY, RIGHT URETERAL STENT PLACEMENT     -RIGHT SUPERFICIAL PAROTIDECTOMY on 12/26/23) due to a nodule     Surgery Location:  [facility]   Surgeon: Reginaldo Pritchett   Surgery Date: 12/12/23 and 12/26/23  Time of Surgery: 11:30AM  Where patient plans to recover: At home with family  Fax number for surgical facility: Note does not need to be faxed, will be available electronically in Epic.    Assessment & Plan     The proposed surgical procedure is considered LOW risk.    Problem List Items Addressed This Visit       JUAN on CPAP    Diabetes mellitus, type 2 (H)     Other Visit Diagnoses       Pre-op evaluation    -  Primary    Relevant Orders    REVIEW OF HEALTH MAINTENANCE PROTOCOL ORDERS (Completed)    EKG 12-lead, tracing only (Completed)    CBC with platelets    Basic metabolic panel    Ureteral calculi        Parotid nodule                        - No identified additional risk factors other than previously addressed    Antiplatelet or Anticoagulation Medication Instructions:   - Patient is on no antiplatelet or anticoagulation medications.    Additional Medication Instructions:  Patient is to take all scheduled medications on the day of  surgery    RECOMMENDATION:  APPROVAL GIVEN to proceed with proposed procedure, without further diagnostic evaluation.            Subjective         12/1/2023    11:42 AM   Preop Questions   1. Have you ever had a heart attack or stroke? No   2. Have you ever had surgery on your heart or blood vessels, such as a stent placement, a coronary artery bypass, or surgery on an artery in your head, neck, heart, or legs? No   3. Do you have chest pain with activity? No   4. Do you have a history of  heart failure? No   5. Do you currently have a cold, bronchitis or symptoms of other infection? No   6. Do you have a cough, shortness of breath, or wheezing? No   7. Do you or anyone in your family have previous history of blood clots? No   8. Do you or does anyone in your family have a serious bleeding problem such as prolonged bleeding following surgeries or cuts? No   9. Have you ever had problems with anemia or been told to take iron pills? No   10. Have you had any abnormal blood loss such as black, tarry or bloody stools? No   11. Have you ever had a blood transfusion? No   12. Are you willing to have a blood transfusion if it is medically needed before, during, or after your surgery? Yes   13. Have you or any of your relatives ever had problems with anesthesia? No   14. Do you have sleep apnea, excessive snoring or daytime drowsiness? YES -    14a. Do you have a CPAP machine? Yes   15. Do you have any artifical heart valves or other implanted medical devices like a pacemaker, defibrillator, or continuous glucose monitor? No   16. Do you have artificial joints? No   17. Are you allergic to latex? No       Preoperative Review of :   reviewed - no record of controlled substances prescribed.        Review of Systems  Constitutional, neuro, ENT, endocrine, pulmonary, cardiac, gastrointestinal, genitourinary, musculoskeletal, integument and psychiatric systems are negative, except as otherwise noted.    Patient Active  Problem List    Diagnosis Date Noted    Diabetes mellitus, type 2 (H) 03/30/2023     Priority: Medium    Diabetic ketoacidosis without coma associated with type 2 diabetes mellitus (H) 03/30/2023     Priority: Medium    Impaired fasting glucose 06/04/2021     Priority: Medium    Morbid obesity (H) 06/04/2021     Priority: Medium    History of colonic polyps 12/11/2019     Priority: Medium    JUAN on CPAP 12/11/2019     Priority: Medium    Fatty liver 06/28/2017     Priority: Medium    Cervicalgia 12/29/2005     Priority: Medium     Non-specific Cervical Spine Pain      Lumbago 12/29/2005     Priority: Medium     Mechanical Low Back Pain      Chronic suppurative otitis media 12/07/2005     Priority: Medium     Problem list name updated by automated process. Provider to review        Past Medical History:   Diagnosis Date    Cervicalgia     Non-specific Cervical Spine Pain    Lumbago     Mechanical Low Back Pain    Muscular wasting and disuse atrophy, not elsewhere classified     Deconditioning Syndrome    Unspecified chronic suppurative otitis media      Past Surgical History:   Procedure Laterality Date    NO PAST SURGERIES       Current Outpatient Medications   Medication Sig Dispense Refill    albuterol (PROAIR HFA/PROVENTIL HFA/VENTOLIN HFA) 108 (90 Base) MCG/ACT inhaler Inhale 2 puffs into the lungs every 6 hours as needed for shortness of breath, wheezing or cough 18 g 0    Continuous Blood Gluc Sensor (FREESTYLE LUKE 2 SENSOR) MISC Change every 14 days. 6 each 1    HYDROcodone-acetaminophen (NORCO) 5-325 MG tablet TAKE 1 TO 2 TABLETS BY MOUTH EVERY 4 HOURS AS NEEDED FOR PAIN.      ondansetron (ZOFRAN) 8 MG tablet Take 8 mg by mouth every 8 hours as needed      oxyCODONE (ROXICODONE) 5 MG tablet Take 5-10 mg by mouth every 4 hours as needed      Semaglutide, 1 MG/DOSE, (OZEMPIC) 4 MG/3ML pen Inject 1 mg Subcutaneous every 7 days 3 mL 3    testosterone (ANDROGEL 1.62 % PUMP) 20.25 MG/ACT gel Place 40.5 mg onto  "the skin daily Apply from dispenser to clean, dry, intact skin of the upper arms and shoulders. 75 g 1    losartan (COZAAR) 25 MG tablet Take 1 tablet (25 mg) by mouth daily (Patient not taking: Reported on 12/1/2023) 90 tablet 0    multivitamin w/minerals (MULTIVITAMIN, THERAPEUTIC WITH MINERALS) tablet Take 1 tablet by mouth daily (Patient not taking: Reported on 12/1/2023)      STATIN NOT PRESCRIBED (INTENTIONAL) Please choose reason not prescribed from choices below. (Patient not taking: Reported on 12/1/2023)      tamsulosin (FLOMAX) 0.4 MG capsule Take 0.4 mg by mouth daily (Patient not taking: Reported on 12/1/2023)         No Known Allergies     Social History     Tobacco Use    Smoking status: Never    Smokeless tobacco: Never   Substance Use Topics    Alcohol use: Yes     Comment: Alcoholic Drinks/day: rare       History   Drug Use Unknown         Objective     BP (!) 144/86 (BP Location: Right arm, Patient Position: Sitting, Cuff Size: Adult Regular)   Pulse 86   Temp 98.1  F (36.7  C) (Oral)   Resp 18   Ht 1.778 m (5' 10\")   Wt 125.6 kg (277 lb)   SpO2 96%   BMI 39.75 kg/m      Physical Exam    GENERAL APPEARANCE: healthy, alert and no distress     EYES: EOMI,  PERRL     HENT: oropharynx clear     NECK: no adenopathy, no asymmetry, masses, or scars and thyroid normal to palpation     RESP: lungs clear to auscultation - no rales, rhonchi or wheezes     CV: regular rates and rhythm, normal S1 S2, no S3 or S4 and no murmur, click or rub     ABDOMEN:  soft, nontender, no HSM or masses and bowel sounds normal     MS: extremities normal- no gross deformities noted, no evidence of inflammation in joints, FROM in all extremities.     SKIN: no suspicious lesions or rashes     NEURO: Normal strength and tone, sensory exam grossly normal, mentation intact and speech normal     PSYCH: mentation appears normal. and affect normal/bright     LYMPHATICS: No cervical adenopathy    Recent Labs   Lab Test " 10/23/23  1539 06/13/23  1435 06/08/23  1432 03/28/23  1459   HGB 15.4  --   --  14.9     --   --  153    139  --  144   POTASSIUM 4.7 4.6  --  4.2   CR 1.24* 1.09   < > 1.11   A1C 5.7* 6.3*  --  7.6*    < > = values in this interval not displayed.        Diagnostics:  Recent Results (from the past 720 hour(s))   EKG 12-lead, tracing only    Collection Time: 12/01/23 12:29 PM   Result Value Ref Range    Systolic Blood Pressure  mmHg    Diastolic Blood Pressure  mmHg    Ventricular Rate 76 BPM    Atrial Rate 76 BPM    NV Interval 150 ms    QRS Duration 92 ms     ms    QTc 411 ms    P Axis 32 degrees    R AXIS -1 degrees    T Axis 17 degrees    Interpretation ECG       Sinus rhythm  Normal ECG  No previous ECGs available  Confirmed by JOHNNIE MELLO MD LOC: ([id number]) on 12/2/2023 11:54:36 AM     CBC with platelets    Collection Time: 12/01/23 12:57 PM   Result Value Ref Range    WBC Count 8.0 4.0 - 11.0 10e3/uL    RBC Count 4.99 4.40 - 5.90 10e6/uL    Hemoglobin 15.7 13.3 - 17.7 g/dL    Hematocrit 44.3 40.0 - 53.0 %    MCV 89 78 - 100 fL    MCH 31.5 26.5 - 33.0 pg    MCHC 35.4 31.5 - 36.5 g/dL    RDW 12.4 10.0 - 15.0 %    Platelet Count 192 150 - 450 10e3/uL   Basic metabolic panel    Collection Time: 12/01/23 12:57 PM   Result Value Ref Range    Sodium 137 135 - 145 mmol/L    Potassium 4.2 3.4 - 5.3 mmol/L    Chloride 101 98 - 107 mmol/L    Carbon Dioxide (CO2) 27 22 - 29 mmol/L    Anion Gap 9 7 - 15 mmol/L    Urea Nitrogen 15.0 8.0 - 23.0 mg/dL    Creatinine 1.07 0.67 - 1.17 mg/dL    GFR Estimate 80 >60 mL/min/1.73m2    Calcium 9.1 8.6 - 10.0 mg/dL    Glucose 117 (H) 70 - 99 mg/dL          Revised Cardiac Risk Index (RCRI):  The patient has the following serious cardiovascular risks for perioperative complications:   - No serious cardiac risks = 0 points     RCRI Interpretation: 0 points: Class I (very low risk - 0.4% complication rate)         Signed Electronically by: Odessa Hull MD  Copy  of this evaluation report is provided to requesting physician.

## 2023-12-02 LAB
ANION GAP SERPL CALCULATED.3IONS-SCNC: 9 MMOL/L (ref 7–15)
ATRIAL RATE - MUSE: 76 BPM
BUN SERPL-MCNC: 15 MG/DL (ref 8–23)
CALCIUM SERPL-MCNC: 9.1 MG/DL (ref 8.6–10)
CHLORIDE SERPL-SCNC: 101 MMOL/L (ref 98–107)
CREAT SERPL-MCNC: 1.07 MG/DL (ref 0.67–1.17)
DEPRECATED HCO3 PLAS-SCNC: 27 MMOL/L (ref 22–29)
DIASTOLIC BLOOD PRESSURE - MUSE: NORMAL MMHG
EGFRCR SERPLBLD CKD-EPI 2021: 80 ML/MIN/1.73M2
GLUCOSE SERPL-MCNC: 117 MG/DL (ref 70–99)
INTERPRETATION ECG - MUSE: NORMAL
P AXIS - MUSE: 32 DEGREES
POTASSIUM SERPL-SCNC: 4.2 MMOL/L (ref 3.4–5.3)
PR INTERVAL - MUSE: 150 MS
QRS DURATION - MUSE: 92 MS
QT - MUSE: 366 MS
QTC - MUSE: 411 MS
R AXIS - MUSE: -1 DEGREES
SODIUM SERPL-SCNC: 137 MMOL/L (ref 135–145)
SYSTOLIC BLOOD PRESSURE - MUSE: NORMAL MMHG
T AXIS - MUSE: 17 DEGREES
VENTRICULAR RATE- MUSE: 76 BPM

## 2023-12-02 PROCEDURE — 93010 ELECTROCARDIOGRAM REPORT: CPT | Performed by: INTERNAL MEDICINE

## 2023-12-11 RX ORDER — TAMSULOSIN HYDROCHLORIDE 0.4 MG/1
0.4 CAPSULE ORAL DAILY
COMMUNITY
End: 2024-04-05

## 2023-12-11 RX ORDER — OXYCODONE HYDROCHLORIDE 5 MG/1
5-10 TABLET ORAL EVERY 4 HOURS PRN
Status: ON HOLD | COMMUNITY
End: 2023-12-26

## 2023-12-12 ENCOUNTER — HOSPITAL ENCOUNTER (OUTPATIENT)
Facility: HOSPITAL | Age: 58
Discharge: HOME | End: 2023-12-12
Attending: UROLOGY | Admitting: UROLOGY
Payer: COMMERCIAL

## 2023-12-12 ENCOUNTER — ANESTHESIA (OUTPATIENT)
Dept: SURGERY | Facility: HOSPITAL | Age: 58
End: 2023-12-12
Payer: COMMERCIAL

## 2023-12-12 ENCOUNTER — ANESTHESIA EVENT (OUTPATIENT)
Dept: SURGERY | Facility: HOSPITAL | Age: 58
End: 2023-12-12
Payer: COMMERCIAL

## 2023-12-12 ENCOUNTER — APPOINTMENT (OUTPATIENT)
Dept: RADIOLOGY | Facility: HOSPITAL | Age: 58
End: 2023-12-12
Attending: UROLOGY
Payer: COMMERCIAL

## 2023-12-12 VITALS
RESPIRATION RATE: 16 BRPM | DIASTOLIC BLOOD PRESSURE: 92 MMHG | BODY MASS INDEX: 38.74 KG/M2 | WEIGHT: 270 LBS | TEMPERATURE: 97.9 F | HEART RATE: 71 BPM | SYSTOLIC BLOOD PRESSURE: 150 MMHG | OXYGEN SATURATION: 98 %

## 2023-12-12 DIAGNOSIS — N20.1 URETERAL CALCULUS: Primary | ICD-10-CM

## 2023-12-12 LAB
GLUCOSE BLDC GLUCOMTR-MCNC: 103 MG/DL (ref 70–99)
GLUCOSE BLDC GLUCOMTR-MCNC: 98 MG/DL (ref 70–99)

## 2023-12-12 PROCEDURE — 258N000003 HC RX IP 258 OP 636: Performed by: ANESTHESIOLOGY

## 2023-12-12 PROCEDURE — 250N000013 HC RX MED GY IP 250 OP 250 PS 637: Performed by: ANESTHESIOLOGY

## 2023-12-12 PROCEDURE — 710N000012 HC RECOVERY PHASE 2, PER MINUTE: Performed by: UROLOGY

## 2023-12-12 PROCEDURE — 258N000003 HC RX IP 258 OP 636: Performed by: NURSE ANESTHETIST, CERTIFIED REGISTERED

## 2023-12-12 PROCEDURE — 250N000011 HC RX IP 250 OP 636: Performed by: NURSE ANESTHETIST, CERTIFIED REGISTERED

## 2023-12-12 PROCEDURE — C1894 INTRO/SHEATH, NON-LASER: HCPCS | Performed by: UROLOGY

## 2023-12-12 PROCEDURE — C1769 GUIDE WIRE: HCPCS | Performed by: UROLOGY

## 2023-12-12 PROCEDURE — 250N000025 HC SEVOFLURANE, PER MIN: Performed by: UROLOGY

## 2023-12-12 PROCEDURE — 82962 GLUCOSE BLOOD TEST: CPT

## 2023-12-12 PROCEDURE — 250N000011 HC RX IP 250 OP 636: Performed by: NURSE PRACTITIONER

## 2023-12-12 PROCEDURE — 710N000009 HC RECOVERY PHASE 1, LEVEL 1, PER MIN: Performed by: UROLOGY

## 2023-12-12 PROCEDURE — C2617 STENT, NON-COR, TEM W/O DEL: HCPCS | Performed by: UROLOGY

## 2023-12-12 PROCEDURE — 255N000002 HC RX 255 OP 636: Performed by: UROLOGY

## 2023-12-12 PROCEDURE — 360N000084 HC SURGERY LEVEL 4 W/ FLUORO, PER MIN: Performed by: UROLOGY

## 2023-12-12 PROCEDURE — 370N000017 HC ANESTHESIA TECHNICAL FEE, PER MIN: Performed by: UROLOGY

## 2023-12-12 PROCEDURE — 999N000180 XR SURGERY CARM FLUORO LESS THAN 5 MIN

## 2023-12-12 PROCEDURE — 272N000001 HC OR GENERAL SUPPLY STERILE: Performed by: UROLOGY

## 2023-12-12 PROCEDURE — 250N000009 HC RX 250: Performed by: NURSE ANESTHETIST, CERTIFIED REGISTERED

## 2023-12-12 PROCEDURE — 999N000141 HC STATISTIC PRE-PROCEDURE NURSING ASSESSMENT: Performed by: UROLOGY

## 2023-12-12 PROCEDURE — 258N000001 HC RX 258: Performed by: UROLOGY

## 2023-12-12 PROCEDURE — 250N000011 HC RX IP 250 OP 636: Mod: JZ | Performed by: ANESTHESIOLOGY

## 2023-12-12 DEVICE — URETERAL STENT
Type: IMPLANTABLE DEVICE | Site: URETER | Status: FUNCTIONAL
Brand: PERCUFLEX™ PLUS

## 2023-12-12 RX ORDER — PROPOFOL 10 MG/ML
INJECTION, EMULSION INTRAVENOUS CONTINUOUS PRN
Status: DISCONTINUED | OUTPATIENT
Start: 2023-12-12 | End: 2023-12-12

## 2023-12-12 RX ORDER — ONDANSETRON 2 MG/ML
4 INJECTION INTRAMUSCULAR; INTRAVENOUS EVERY 30 MIN PRN
Status: DISCONTINUED | OUTPATIENT
Start: 2023-12-12 | End: 2023-12-12 | Stop reason: HOSPADM

## 2023-12-12 RX ORDER — OXYCODONE HYDROCHLORIDE 5 MG/1
5 TABLET ORAL EVERY 4 HOURS PRN
Status: DISCONTINUED | OUTPATIENT
Start: 2023-12-12 | End: 2023-12-12 | Stop reason: HOSPADM

## 2023-12-12 RX ORDER — KETAMINE HYDROCHLORIDE 10 MG/ML
INJECTION INTRAMUSCULAR; INTRAVENOUS PRN
Status: DISCONTINUED | OUTPATIENT
Start: 2023-12-12 | End: 2023-12-12

## 2023-12-12 RX ORDER — DEXAMETHASONE SODIUM PHOSPHATE 10 MG/ML
INJECTION, SOLUTION INTRAMUSCULAR; INTRAVENOUS PRN
Status: DISCONTINUED | OUTPATIENT
Start: 2023-12-12 | End: 2023-12-12

## 2023-12-12 RX ORDER — HALOPERIDOL 5 MG/ML
1 INJECTION INTRAMUSCULAR
Status: DISCONTINUED | OUTPATIENT
Start: 2023-12-12 | End: 2023-12-12 | Stop reason: HOSPADM

## 2023-12-12 RX ORDER — ACETAMINOPHEN 325 MG/1
650 TABLET ORAL EVERY 4 HOURS PRN
Status: DISCONTINUED | OUTPATIENT
Start: 2023-12-12 | End: 2023-12-12 | Stop reason: HOSPADM

## 2023-12-12 RX ORDER — FENTANYL CITRATE 50 UG/ML
25 INJECTION, SOLUTION INTRAMUSCULAR; INTRAVENOUS EVERY 5 MIN PRN
Status: DISCONTINUED | OUTPATIENT
Start: 2023-12-12 | End: 2023-12-12 | Stop reason: HOSPADM

## 2023-12-12 RX ORDER — SODIUM CHLORIDE, SODIUM LACTATE, POTASSIUM CHLORIDE, CALCIUM CHLORIDE 600; 310; 30; 20 MG/100ML; MG/100ML; MG/100ML; MG/100ML
INJECTION, SOLUTION INTRAVENOUS CONTINUOUS
Status: DISCONTINUED | OUTPATIENT
Start: 2023-12-12 | End: 2023-12-12 | Stop reason: HOSPADM

## 2023-12-12 RX ORDER — ONDANSETRON 2 MG/ML
INJECTION INTRAMUSCULAR; INTRAVENOUS PRN
Status: DISCONTINUED | OUTPATIENT
Start: 2023-12-12 | End: 2023-12-12

## 2023-12-12 RX ORDER — OXYCODONE HYDROCHLORIDE 5 MG/1
10 TABLET ORAL EVERY 4 HOURS PRN
Status: DISCONTINUED | OUTPATIENT
Start: 2023-12-12 | End: 2023-12-12 | Stop reason: HOSPADM

## 2023-12-12 RX ORDER — MAGNESIUM SULFATE 4 G/50ML
4 INJECTION INTRAVENOUS ONCE
Status: COMPLETED | OUTPATIENT
Start: 2023-12-12 | End: 2023-12-12

## 2023-12-12 RX ORDER — MEPERIDINE HYDROCHLORIDE 25 MG/ML
12.5 INJECTION INTRAMUSCULAR; INTRAVENOUS; SUBCUTANEOUS EVERY 5 MIN PRN
Status: DISCONTINUED | OUTPATIENT
Start: 2023-12-12 | End: 2023-12-12 | Stop reason: HOSPADM

## 2023-12-12 RX ORDER — FENTANYL CITRATE 50 UG/ML
25 INJECTION, SOLUTION INTRAMUSCULAR; INTRAVENOUS
Status: DISCONTINUED | OUTPATIENT
Start: 2023-12-12 | End: 2023-12-12 | Stop reason: HOSPADM

## 2023-12-12 RX ORDER — OXYBUTYNIN CHLORIDE 5 MG/1
5 TABLET ORAL 3 TIMES DAILY PRN
Qty: 30 TABLET | Refills: 1 | Status: SHIPPED | OUTPATIENT
Start: 2023-12-12 | End: 2024-04-05

## 2023-12-12 RX ORDER — NALOXONE HYDROCHLORIDE 0.4 MG/ML
0.4 INJECTION, SOLUTION INTRAMUSCULAR; INTRAVENOUS; SUBCUTANEOUS
Status: DISCONTINUED | OUTPATIENT
Start: 2023-12-12 | End: 2023-12-12 | Stop reason: HOSPADM

## 2023-12-12 RX ORDER — PROPOFOL 10 MG/ML
INJECTION, EMULSION INTRAVENOUS PRN
Status: DISCONTINUED | OUTPATIENT
Start: 2023-12-12 | End: 2023-12-12

## 2023-12-12 RX ORDER — ACETAMINOPHEN 325 MG/1
975 TABLET ORAL ONCE
Status: COMPLETED | OUTPATIENT
Start: 2023-12-12 | End: 2023-12-12

## 2023-12-12 RX ORDER — LORAZEPAM 2 MG/ML
.5-1 INJECTION INTRAMUSCULAR
Status: DISCONTINUED | OUTPATIENT
Start: 2023-12-12 | End: 2023-12-12 | Stop reason: HOSPADM

## 2023-12-12 RX ORDER — HYDROCODONE BITARTRATE AND ACETAMINOPHEN 5; 325 MG/1; MG/1
1-2 TABLET ORAL EVERY 4 HOURS PRN
Qty: 12 TABLET | Refills: 0 | Status: ON HOLD | OUTPATIENT
Start: 2023-12-12 | End: 2023-12-26

## 2023-12-12 RX ORDER — OXYBUTYNIN CHLORIDE 5 MG/1
5 TABLET ORAL 3 TIMES DAILY PRN
Status: DISCONTINUED | OUTPATIENT
Start: 2023-12-12 | End: 2023-12-12 | Stop reason: HOSPADM

## 2023-12-12 RX ORDER — ONDANSETRON 4 MG/1
4 TABLET, ORALLY DISINTEGRATING ORAL EVERY 30 MIN PRN
Status: DISCONTINUED | OUTPATIENT
Start: 2023-12-12 | End: 2023-12-12 | Stop reason: HOSPADM

## 2023-12-12 RX ORDER — CEFAZOLIN SODIUM/WATER 3 G/30 ML
3 SYRINGE (ML) INTRAVENOUS SEE ADMIN INSTRUCTIONS
Status: DISCONTINUED | OUTPATIENT
Start: 2023-12-12 | End: 2023-12-12 | Stop reason: HOSPADM

## 2023-12-12 RX ORDER — NALOXONE HYDROCHLORIDE 0.4 MG/ML
0.2 INJECTION, SOLUTION INTRAMUSCULAR; INTRAVENOUS; SUBCUTANEOUS
Status: DISCONTINUED | OUTPATIENT
Start: 2023-12-12 | End: 2023-12-12 | Stop reason: HOSPADM

## 2023-12-12 RX ORDER — LIDOCAINE 40 MG/G
CREAM TOPICAL
Status: DISCONTINUED | OUTPATIENT
Start: 2023-12-12 | End: 2023-12-12 | Stop reason: HOSPADM

## 2023-12-12 RX ORDER — KETOROLAC TROMETHAMINE 30 MG/ML
15 INJECTION, SOLUTION INTRAMUSCULAR; INTRAVENOUS
Status: DISCONTINUED | OUTPATIENT
Start: 2023-12-12 | End: 2023-12-12 | Stop reason: HOSPADM

## 2023-12-12 RX ORDER — ACETAMINOPHEN 325 MG/1
975 TABLET ORAL
Status: DISCONTINUED | OUTPATIENT
Start: 2023-12-12 | End: 2023-12-12 | Stop reason: HOSPADM

## 2023-12-12 RX ORDER — LIDOCAINE HYDROCHLORIDE 10 MG/ML
INJECTION, SOLUTION INFILTRATION; PERINEURAL PRN
Status: DISCONTINUED | OUTPATIENT
Start: 2023-12-12 | End: 2023-12-12

## 2023-12-12 RX ORDER — CEFAZOLIN SODIUM/WATER 3 G/30 ML
3 SYRINGE (ML) INTRAVENOUS
Status: COMPLETED | OUTPATIENT
Start: 2023-12-12 | End: 2023-12-12

## 2023-12-12 RX ORDER — HYDROCODONE BITARTRATE AND ACETAMINOPHEN 5; 325 MG/1; MG/1
1 TABLET ORAL EVERY 4 HOURS PRN
Status: DISCONTINUED | OUTPATIENT
Start: 2023-12-12 | End: 2023-12-12 | Stop reason: HOSPADM

## 2023-12-12 RX ORDER — FENTANYL CITRATE 50 UG/ML
INJECTION, SOLUTION INTRAMUSCULAR; INTRAVENOUS PRN
Status: DISCONTINUED | OUTPATIENT
Start: 2023-12-12 | End: 2023-12-12

## 2023-12-12 RX ORDER — FENTANYL CITRATE 50 UG/ML
50 INJECTION, SOLUTION INTRAMUSCULAR; INTRAVENOUS EVERY 5 MIN PRN
Status: DISCONTINUED | OUTPATIENT
Start: 2023-12-12 | End: 2023-12-12 | Stop reason: HOSPADM

## 2023-12-12 RX ADMIN — MIDAZOLAM 2 MG: 1 INJECTION INTRAMUSCULAR; INTRAVENOUS at 10:10

## 2023-12-12 RX ADMIN — Medication 3 G: at 10:10

## 2023-12-12 RX ADMIN — FENTANYL CITRATE 100 MCG: 50 INJECTION INTRAMUSCULAR; INTRAVENOUS at 10:20

## 2023-12-12 RX ADMIN — MAGNESIUM SULFATE HEPTAHYDRATE 4 G: 80 INJECTION, SOLUTION INTRAVENOUS at 09:58

## 2023-12-12 RX ADMIN — PHENYLEPHRINE HYDROCHLORIDE 100 MCG: 10 INJECTION INTRAVENOUS at 10:52

## 2023-12-12 RX ADMIN — SODIUM CHLORIDE, POTASSIUM CHLORIDE, SODIUM LACTATE AND CALCIUM CHLORIDE: 600; 310; 30; 20 INJECTION, SOLUTION INTRAVENOUS at 09:50

## 2023-12-12 RX ADMIN — SUGAMMADEX 260 MG: 100 INJECTION, SOLUTION INTRAVENOUS at 11:15

## 2023-12-12 RX ADMIN — ACETAMINOPHEN 975 MG: 325 TABLET ORAL at 09:57

## 2023-12-12 RX ADMIN — PHENYLEPHRINE HYDROCHLORIDE 0.4 MCG/KG/MIN: 10 INJECTION INTRAVENOUS at 10:54

## 2023-12-12 RX ADMIN — PHENYLEPHRINE HYDROCHLORIDE 100 MCG: 10 INJECTION INTRAVENOUS at 10:38

## 2023-12-12 RX ADMIN — PROPOFOL 200 MG: 10 INJECTION, EMULSION INTRAVENOUS at 10:20

## 2023-12-12 RX ADMIN — LIDOCAINE HYDROCHLORIDE 5 ML: 10 INJECTION, SOLUTION INFILTRATION; PERINEURAL at 10:20

## 2023-12-12 RX ADMIN — KETAMINE HYDROCHLORIDE 50 MG: 10 INJECTION INTRAMUSCULAR; INTRAVENOUS at 10:20

## 2023-12-12 RX ADMIN — ONDANSETRON 4 MG: 2 INJECTION INTRAMUSCULAR; INTRAVENOUS at 11:15

## 2023-12-12 RX ADMIN — PROPOFOL 50 MCG/KG/MIN: 10 INJECTION, EMULSION INTRAVENOUS at 10:30

## 2023-12-12 RX ADMIN — ROCURONIUM BROMIDE 50 MG: 50 INJECTION, SOLUTION INTRAVENOUS at 10:21

## 2023-12-12 RX ADMIN — DEXAMETHASONE SODIUM PHOSPHATE 5 MG: 10 INJECTION, SOLUTION INTRAMUSCULAR; INTRAVENOUS at 10:49

## 2023-12-12 RX ADMIN — OXYCODONE HYDROCHLORIDE 5 MG: 5 TABLET ORAL at 13:52

## 2023-12-12 RX ADMIN — SODIUM CHLORIDE, POTASSIUM CHLORIDE, SODIUM LACTATE AND CALCIUM CHLORIDE: 600; 310; 30; 20 INJECTION, SOLUTION INTRAVENOUS at 11:49

## 2023-12-12 ASSESSMENT — ACTIVITIES OF DAILY LIVING (ADL)
ADLS_ACUITY_SCORE: 33
ADLS_ACUITY_SCORE: 20

## 2023-12-12 NOTE — ANESTHESIA POSTPROCEDURE EVALUATION
Patient: Juanito Delacruz    Procedure: Procedure(s):  CYSTOSCOPY, RIGHT RETROGRADE PYELOGRAM, RIGHT URETEROSCOPY WITH LASER STANDBY, RIGHT URETERAL STENT PLACEMENT       Anesthesia Type:  General    Note:  Disposition: Outpatient   Postop Pain Control: Uneventful            Sign Out: Well controlled pain   PONV: No   Neuro/Psych: Uneventful            Sign Out: Acceptable/Baseline neuro status   Airway/Respiratory: Uneventful            Sign Out: Acceptable/Baseline resp. status   CV/Hemodynamics: Uneventful            Sign Out: Acceptable CV status; No obvious hypovolemia; No obvious fluid overload   Other NRE: NONE   DID A NON-ROUTINE EVENT OCCUR? No           Last vitals:  Vitals Value Taken Time   /80 12/12/23 1215   Temp 36.2  C (97.2  F) 12/12/23 1215   Pulse 67 12/12/23 1225   Resp 11 12/12/23 1225   SpO2 96 % 12/12/23 1225   Vitals shown include unfiled device data.    Electronically Signed By: Archie Laura MD  December 12, 2023  3:27 PM

## 2023-12-12 NOTE — OP NOTE
Location: [facility]     Patient Name: Juanito Delacruz  Patient : 1965  Patient MRN: [id number]  Patient CSN: [id number]  Patient PCP: Archie Mosher    Date of Service: 23     Pre-operative diagnosis: Right ureteral stone    Post-operative diagnosis: Right ureteral stone    Procedure:  Cystoscopy, Right retrograde pyelogram, Right ureteroscopy, Right 6 Fr x 28 cm ureteral stent placement    Surgeon: Dr. Reginaldo Pritchett    EBL: 5 mL    Anesthesia: General    Indication: 58 year old male who presented with on 23 with gross hematuria. Cystoscopy was negative and a CT urogram on 23 showed a 5 mm right lower pole renal stone. Patient contacted me on 23 with concerns that he was passing the right kidney stone.  A CT scan on 23 showed a 6 x 5 x 4 mm right UPJ stone. After discussion he wanted to try pass the stone and I recommended getting a CT scan in 3-4 weeks. Repeat imaging was done on 23 which showed that the right UPJ stone was now in the distal ureter. I recommended ureteroscopy with laser lithotripsy.  Risks, benefits, and alternatives to treatment were discussed with the patient and the patient elected to proceed.    Findings: His urethra was normal. His prostate had bilobar hyperplasia with a mild elevated bladder neck. His bilateral ureteral orifices were normal.  No stones or tumors were in the bladder.  The right distal ureteral stone was seen on  imaging.  No right hydronephrosis was present. The distal ureter was too tight for placement of the rigid ureteroscope with the wire in place.  I was able to get the flexible ureteroscope into the distal ureter over a Super Stiff wire. I was able to visualize the stone but was not able to maneuver the flexible ureteroscope up to the level of the stone.  A right ureteral stent was in good position.    Specimens: None    Procedure:  After written informed consent was obtained the patient was brought into the operating room.   The patient was properly identified prior to the procedure, received preprocedure antibiotics, and had sequential compression devices on the legs.  The patient was then induced under anesthesia.    The patient was placed in dorsal lithotomy position and prepped and draped in the usual sterile fashion.  His urethral meatus was calibrated to 24 Fr using Tucker sounds. Cystoscopy was performed with the above findings.    A Right retrograde pyelogram was performed with the above findings.    A straight sensor wire was placed into the Right kidney. The 8/10 Fr coaxial dilators were then used to dilate the ureteral orifice.  Right rigid ureteroscopy was attempted but the ureter was too tight.  I attempted placement of the flexible ureteroscope over the straight sensor wire without success. I exchanged the straight sensor wire for the super stiff wire using the 5 Fr open-ended ureteral catheter.  I then passed the 8/10 Fr coaxial dilators again and this time was able to get to the level of the stone.  Over the super stiff wire, I was able to advance the flexible ureteroscope to the level of the stone. The super stiff wire was then removed.  I could see the stone via ureteroscopy but I could not drive proximal or maneuver effectively to safely treat the stone.  At this point, I elected to place a stent.  The straight sensor wire was replaced into the right kidney under direct vision and the flexible ureteroscope removed leaving the straight sensor wire in place.  The straight sensor wire was exchanged for the Super Stiff wire. A 6 Fr x 28 cm ureteral stent was then deployed over the Super Stiff wire. There was a good coil in the kidney and a good coil in the bladder.  I verified the distal coil via cystoscopy. I emptied the bladder and removed the scope. At this point, the procedure was completed.  He tolerated the procedure well.    Plan: Home. Will arrange for repeat surgery after allowing for passive ureteral  dilation from the stent.    Reginaldo Pritchett MD  11:15 AM

## 2023-12-12 NOTE — ANESTHESIA CARE TRANSFER NOTE
Patient: Juanito Delacruz    Procedure: Procedure(s):  CYSTOSCOPY, RIGHT RETROGRADE PYELOGRAM, RIGHT URETEROSCOPY WITH LASER STANDBY, RIGHT URETERAL STENT PLACEMENT       Diagnosis: Ureteric stone [N20.1]  Diagnosis Additional Information: No value filed.    Anesthesia Type:   General     Note:    Oropharynx: oropharynx clear of all foreign objects and spontaneously breathing  Level of Consciousness: awake  Oxygen Supplementation: face mask  Level of Supplemental Oxygen (L/min / FiO2): 10  Independent Airway: airway patency satisfactory and stable  Dentition: dentition unchanged  Vital Signs Stable: post-procedure vital signs reviewed and stable  Report to RN Given: handoff report given  Patient transferred to: PACU  Comments: Sats 94%  Handoff Report: Identifed the Patient, Identified the Reponsible Provider, Reviewed the pertinent medical history, Discussed the surgical course, Reviewed Intra-OP anesthesia mangement and issues during anesthesia, Set expectations for post-procedure period and Allowed opportunity for questions and acknowledgement of understanding    Vitals:  Vitals Value Taken Time   /77 12/12/23 1136   Temp     Pulse 84 12/12/23 1138   Resp 12 12/12/23 1138   SpO2 93 % 12/12/23 1138   Vitals shown include unfiled device data.    Electronically Signed By: CICI Alva CRNA  December 12, 2023  11:40 AM

## 2023-12-12 NOTE — INTERVAL H&P NOTE
"I have reviewed the surgical (or preoperative) H&P that is linked to this encounter, and examined the patient. There are no significant changes    Clinical Conditions Present on Arrival:  Clinically Significant Risk Factors Present on Admission                  # Obesity: Estimated body mass index is 39.75 kg/m  as calculated from the following:    Height as of 12/1/23: 1.778 m (5' 10\").    Weight as of 12/1/23: 125.6 kg (277 lb).       "

## 2023-12-12 NOTE — ANESTHESIA PREPROCEDURE EVALUATION
Anesthesia Pre-Procedure Evaluation    Patient: Juanito Delacruz   MRN: [id number] : 1965        Procedure : Procedure(s):  CYSTOSCOPY, RIGHT RETROGRADE PYELOGRAM, RIGHT URETEROSCOPY WITH LASER LITHOTRIPSY, RIGHT URETERAL STENT PLACEMENT          Past Medical History:   Diagnosis Date    Cervicalgia     Non-specific Cervical Spine Pain    Diabetic ketoacidosis without coma associated with type 2 diabetes mellitus (H)     Lumbago     Mechanical Low Back Pain    Muscular wasting and disuse atrophy, not elsewhere classified     Deconditioning Syndrome    Parotid nodule     Sleep apnea     Unspecified chronic suppurative otitis media     Ureteral stone       Past Surgical History:   Procedure Laterality Date    NO PAST SURGERIES        No Known Allergies   Social History     Tobacco Use    Smoking status: Never    Smokeless tobacco: Never   Substance Use Topics    Alcohol use: Yes     Comment: Alcoholic Drinks/day: rare      Wt Readings from Last 1 Encounters:   23 122.5 kg (270 lb)        Anesthesia Evaluation   Pt has had prior anesthetic.     No history of anesthetic complications       ROS/MED HX  ENT/Pulmonary:     (+) sleep apnea,                                      Neurologic:  - neg neurologic ROS     Cardiovascular:  - neg cardiovascular ROS     METS/Exercise Tolerance:     Hematologic:  - neg hematologic  ROS     Musculoskeletal:  - neg musculoskeletal ROS     GI/Hepatic:     (+)             liver disease,       Renal/Genitourinary:       Endo:     (+) type I DM,              Obesity,       Psychiatric/Substance Use:  - neg psychiatric ROS     Infectious Disease:  - neg infectious disease ROS     Malignancy:  - neg malignancy ROS     Other:  - neg other ROS          Physical Exam    Airway        Mallampati: II   TM distance: > 3 FB   Neck ROM: full     Respiratory Devices and Support         Dental       (+) Minor Abnormalities - some fillings, tiny chips      Cardiovascular   cardiovascular exam  "normal          Pulmonary   pulmonary exam normal                OUTSIDE LABS:  CBC:   Lab Results   Component Value Date    WBC 8.0 12/01/2023    WBC 7.3 10/23/2023    HGB 15.7 12/01/2023    HGB 15.4 10/23/2023    HCT 44.3 12/01/2023    HCT 43.9 10/23/2023     12/01/2023     10/23/2023     BMP:   Lab Results   Component Value Date     12/01/2023     10/23/2023    POTASSIUM 4.2 12/01/2023    POTASSIUM 4.7 10/23/2023    CHLORIDE 101 12/01/2023    CHLORIDE 102 10/23/2023    CO2 27 12/01/2023    CO2 26 10/23/2023    BUN 15.0 12/01/2023    BUN 19.9 10/23/2023    CR 1.07 12/01/2023    CR 1.24 (H) 10/23/2023     (H) 12/12/2023     (H) 12/01/2023     COAGS: No results found for: \"PTT\", \"INR\", \"FIBR\"  POC: No results found for: \"BGM\", \"HCG\", \"HCGS\"  HEPATIC:   Lab Results   Component Value Date    ALBUMIN 4.5 03/28/2023    PROTTOTAL 7.2 03/28/2023    ALT 77 (H) 03/28/2023    AST 44 03/28/2023    ALKPHOS 55 03/28/2023    BILITOTAL 0.3 03/28/2023     OTHER:   Lab Results   Component Value Date    A1C 5.7 (H) 10/23/2023    MARYANNE 9.1 12/01/2023    TSH 1.02 09/28/2022    SED 7 03/28/2023       Anesthesia Plan    ASA Status:  2    NPO Status:  NPO Appropriate    Anesthesia Type: General.     - Airway: ETT   Induction: Intravenous.   Maintenance: Balanced.   Techniques and Equipment:     - Airway: Video-Laryngoscope       Consents    Anesthesia Plan(s) and associated risks, benefits, and realistic alternatives discussed. Questions answered and patient/representative(s) expressed understanding.     - Discussed:     - Discussed with:  Patient      - Extended Intubation/Ventilatory Support Discussed: No.      - Patient is DNR/DNI Status: No     Use of blood products discussed: No .     Postoperative Care    Pain management: IV analgesics, Oral pain medications, Multi-modal analgesia.   PONV prophylaxis: Ondansetron (or other 5HT-3), Dexamethasone or Solumedrol     Comments:    Other Comments: " "Decadron, Zofran.  Diprivan infusion.  Toradol, Tylenol.  Ketamine (0.5 mg/kg).  Magnesium.           Archie Laura MD    I have reviewed the pertinent notes and labs in the chart from the past 30 days and (re)examined the patient.  Any updates or changes from those notes are reflected in this note.              # Obesity: Estimated body mass index is 38.74 kg/m  as calculated from the following:    Height as of 12/1/23: 1.778 m (5' 10\").    Weight as of this encounter: 122.5 kg (270 lb).      "

## 2023-12-12 NOTE — ANESTHESIA PROCEDURE NOTES
Airway       Patient location during procedure: OR       Procedure Start/Stop Times: 12/12/2023 10:24 AM  Staff -        CRNA: Toshia Lang APRN CRNA       Performed By: CRNA  Consent for Airway        Urgency: elective  Indications and Patient Condition       Indications for airway management: kedar-procedural       Induction type:intravenous       Mask difficulty assessment: 2 - vent by mask + OA or adjuvant +/- NMBA    Final Airway Details       Final airway type: endotracheal airway       Successful airway: ETT - single and Oral  Endotracheal Airway Details        ETT size (mm): 7.5       Cuffed: yes       Cuff volume (mL): 5       Successful intubation technique: direct laryngoscopy and video laryngoscopy       VL Blade Size: Glidescope 4       Grade View of Cords: 1       Adjucts: stylet       Position: Right       Measured from: lips       Secured at (cm): 23       Bite block used: None    Post intubation assessment        Placement verified by: capnometry, equal breath sounds and chest rise        Number of attempts at approach: 1       Number of other approaches attempted: 0       Secured with: tape       Ease of procedure: easy       Dentition: Intact and Unchanged    Medication(s) Administered   Medication Administration Time: 12/12/2023 10:24 AM

## 2023-12-20 ENCOUNTER — TRANSFERRED RECORDS (OUTPATIENT)
Dept: HEALTH INFORMATION MANAGEMENT | Facility: CLINIC | Age: 58
End: 2023-12-20
Payer: COMMERCIAL

## 2023-12-26 ENCOUNTER — ANESTHESIA EVENT (OUTPATIENT)
Dept: SURGERY | Facility: HOSPITAL | Age: 58
End: 2023-12-26
Payer: COMMERCIAL

## 2023-12-26 ENCOUNTER — HOSPITAL ENCOUNTER (OUTPATIENT)
Facility: HOSPITAL | Age: 58
Discharge: HOME | End: 2023-12-26
Attending: OTOLARYNGOLOGY | Admitting: OTOLARYNGOLOGY
Payer: COMMERCIAL

## 2023-12-26 ENCOUNTER — ANESTHESIA (OUTPATIENT)
Dept: SURGERY | Facility: HOSPITAL | Age: 58
End: 2023-12-26
Payer: COMMERCIAL

## 2023-12-26 VITALS
WEIGHT: 271.1 LBS | RESPIRATION RATE: 16 BRPM | BODY MASS INDEX: 38.9 KG/M2 | OXYGEN SATURATION: 94 % | SYSTOLIC BLOOD PRESSURE: 160 MMHG | HEART RATE: 102 BPM | DIASTOLIC BLOOD PRESSURE: 90 MMHG | TEMPERATURE: 98.2 F

## 2023-12-26 DIAGNOSIS — K11.8 PAROTID MASS: Primary | ICD-10-CM

## 2023-12-26 PROCEDURE — 999N000141 HC STATISTIC PRE-PROCEDURE NURSING ASSESSMENT: Performed by: OTOLARYNGOLOGY

## 2023-12-26 PROCEDURE — 360N000076 HC SURGERY LEVEL 3, PER MIN: Performed by: OTOLARYNGOLOGY

## 2023-12-26 PROCEDURE — 250N000013 HC RX MED GY IP 250 OP 250 PS 637: Performed by: ANESTHESIOLOGY

## 2023-12-26 PROCEDURE — 258N000003 HC RX IP 258 OP 636: Performed by: ANESTHESIOLOGY

## 2023-12-26 PROCEDURE — 250N000013 HC RX MED GY IP 250 OP 250 PS 637: Performed by: OTOLARYNGOLOGY

## 2023-12-26 PROCEDURE — 258N000003 HC RX IP 258 OP 636: Performed by: NURSE ANESTHETIST, CERTIFIED REGISTERED

## 2023-12-26 PROCEDURE — 250N000009 HC RX 250: Performed by: OTOLARYNGOLOGY

## 2023-12-26 PROCEDURE — 250N000011 HC RX IP 250 OP 636: Performed by: OTOLARYNGOLOGY

## 2023-12-26 PROCEDURE — 272N000001 HC OR GENERAL SUPPLY STERILE: Performed by: OTOLARYNGOLOGY

## 2023-12-26 PROCEDURE — 88307 TISSUE EXAM BY PATHOLOGIST: CPT | Mod: TC | Performed by: OTOLARYNGOLOGY

## 2023-12-26 PROCEDURE — 710N000012 HC RECOVERY PHASE 2, PER MINUTE: Performed by: OTOLARYNGOLOGY

## 2023-12-26 PROCEDURE — 250N000025 HC SEVOFLURANE, PER MIN: Performed by: OTOLARYNGOLOGY

## 2023-12-26 PROCEDURE — 250N000011 HC RX IP 250 OP 636: Performed by: ANESTHESIOLOGY

## 2023-12-26 PROCEDURE — 250N000013 HC RX MED GY IP 250 OP 250 PS 637: Performed by: PHYSICIAN ASSISTANT

## 2023-12-26 PROCEDURE — 250N000011 HC RX IP 250 OP 636: Mod: JZ | Performed by: OTOLARYNGOLOGY

## 2023-12-26 PROCEDURE — 250N000011 HC RX IP 250 OP 636: Mod: JZ | Performed by: NURSE ANESTHETIST, CERTIFIED REGISTERED

## 2023-12-26 PROCEDURE — 710N000010 HC RECOVERY PHASE 1, LEVEL 2, PER MIN: Performed by: OTOLARYNGOLOGY

## 2023-12-26 PROCEDURE — 370N000017 HC ANESTHESIA TECHNICAL FEE, PER MIN: Performed by: OTOLARYNGOLOGY

## 2023-12-26 PROCEDURE — 250N000009 HC RX 250: Performed by: NURSE ANESTHETIST, CERTIFIED REGISTERED

## 2023-12-26 RX ORDER — ONDANSETRON 2 MG/ML
4 INJECTION INTRAMUSCULAR; INTRAVENOUS EVERY 30 MIN PRN
Status: DISCONTINUED | OUTPATIENT
Start: 2023-12-26 | End: 2023-12-26 | Stop reason: HOSPADM

## 2023-12-26 RX ORDER — NALOXONE HYDROCHLORIDE 0.4 MG/ML
0.4 INJECTION, SOLUTION INTRAMUSCULAR; INTRAVENOUS; SUBCUTANEOUS
Status: DISCONTINUED | OUTPATIENT
Start: 2023-12-26 | End: 2023-12-26 | Stop reason: HOSPADM

## 2023-12-26 RX ORDER — OXYCODONE HYDROCHLORIDE 5 MG/1
5-10 TABLET ORAL EVERY 4 HOURS PRN
Qty: 10 TABLET | Refills: 0 | Status: SHIPPED | OUTPATIENT
Start: 2023-12-26 | End: 2023-12-26

## 2023-12-26 RX ORDER — SODIUM CHLORIDE, SODIUM LACTATE, POTASSIUM CHLORIDE, CALCIUM CHLORIDE 600; 310; 30; 20 MG/100ML; MG/100ML; MG/100ML; MG/100ML
INJECTION, SOLUTION INTRAVENOUS CONTINUOUS
Status: DISCONTINUED | OUTPATIENT
Start: 2023-12-26 | End: 2023-12-26 | Stop reason: HOSPADM

## 2023-12-26 RX ORDER — FENTANYL CITRATE 50 UG/ML
25 INJECTION, SOLUTION INTRAMUSCULAR; INTRAVENOUS EVERY 5 MIN PRN
Status: DISCONTINUED | OUTPATIENT
Start: 2023-12-26 | End: 2023-12-26 | Stop reason: HOSPADM

## 2023-12-26 RX ORDER — OXYCODONE HYDROCHLORIDE 5 MG/1
5-10 TABLET ORAL EVERY 6 HOURS PRN
Qty: 10 TABLET | Refills: 0 | Status: SHIPPED | OUTPATIENT
Start: 2023-12-26 | End: 2023-12-29

## 2023-12-26 RX ORDER — NALOXONE HYDROCHLORIDE 0.4 MG/ML
0.2 INJECTION, SOLUTION INTRAMUSCULAR; INTRAVENOUS; SUBCUTANEOUS
Status: DISCONTINUED | OUTPATIENT
Start: 2023-12-26 | End: 2023-12-26 | Stop reason: HOSPADM

## 2023-12-26 RX ORDER — FENTANYL CITRATE 50 UG/ML
50 INJECTION, SOLUTION INTRAMUSCULAR; INTRAVENOUS EVERY 5 MIN PRN
Status: DISCONTINUED | OUTPATIENT
Start: 2023-12-26 | End: 2023-12-26 | Stop reason: HOSPADM

## 2023-12-26 RX ORDER — ACETAMINOPHEN 500 MG
1000 TABLET ORAL EVERY 6 HOURS PRN
COMMUNITY
Start: 2023-12-26

## 2023-12-26 RX ORDER — DEXAMETHASONE SODIUM PHOSPHATE 4 MG/ML
INJECTION, SOLUTION INTRA-ARTICULAR; INTRALESIONAL; INTRAMUSCULAR; INTRAVENOUS; SOFT TISSUE PRN
Status: DISCONTINUED | OUTPATIENT
Start: 2023-12-26 | End: 2023-12-26

## 2023-12-26 RX ORDER — MEPERIDINE HYDROCHLORIDE 25 MG/ML
12.5 INJECTION INTRAMUSCULAR; INTRAVENOUS; SUBCUTANEOUS EVERY 5 MIN PRN
Status: DISCONTINUED | OUTPATIENT
Start: 2023-12-26 | End: 2023-12-26 | Stop reason: HOSPADM

## 2023-12-26 RX ORDER — LIDOCAINE HYDROCHLORIDE 10 MG/ML
INJECTION, SOLUTION INFILTRATION; PERINEURAL PRN
Status: DISCONTINUED | OUTPATIENT
Start: 2023-12-26 | End: 2023-12-26

## 2023-12-26 RX ORDER — ONDANSETRON 4 MG/1
4 TABLET, ORALLY DISINTEGRATING ORAL EVERY 30 MIN PRN
Status: DISCONTINUED | OUTPATIENT
Start: 2023-12-26 | End: 2023-12-26 | Stop reason: HOSPADM

## 2023-12-26 RX ORDER — OXYCODONE HYDROCHLORIDE 5 MG/1
5 TABLET ORAL
Status: DISCONTINUED | OUTPATIENT
Start: 2023-12-26 | End: 2023-12-26 | Stop reason: HOSPADM

## 2023-12-26 RX ORDER — ONDANSETRON 2 MG/ML
INJECTION INTRAMUSCULAR; INTRAVENOUS PRN
Status: DISCONTINUED | OUTPATIENT
Start: 2023-12-26 | End: 2023-12-26

## 2023-12-26 RX ORDER — PROPOFOL 10 MG/ML
INJECTION, EMULSION INTRAVENOUS PRN
Status: DISCONTINUED | OUTPATIENT
Start: 2023-12-26 | End: 2023-12-26

## 2023-12-26 RX ORDER — ACETAMINOPHEN 325 MG/1
975 TABLET ORAL
Status: COMPLETED | OUTPATIENT
Start: 2023-12-26 | End: 2023-12-26

## 2023-12-26 RX ORDER — DEXAMETHASONE SODIUM PHOSPHATE 10 MG/ML
10 INJECTION, SOLUTION INTRAMUSCULAR; INTRAVENOUS ONCE
Status: COMPLETED | OUTPATIENT
Start: 2023-12-26 | End: 2023-12-26

## 2023-12-26 RX ORDER — ACETAMINOPHEN 325 MG/1
975 TABLET ORAL ONCE
Status: COMPLETED | OUTPATIENT
Start: 2023-12-26 | End: 2023-12-26

## 2023-12-26 RX ORDER — CEFAZOLIN SODIUM/WATER 3 G/30 ML
3 SYRINGE (ML) INTRAVENOUS SEE ADMIN INSTRUCTIONS
Status: DISCONTINUED | OUTPATIENT
Start: 2023-12-26 | End: 2023-12-26 | Stop reason: HOSPADM

## 2023-12-26 RX ORDER — SODIUM CHLORIDE, SODIUM LACTATE, POTASSIUM CHLORIDE, CALCIUM CHLORIDE 600; 310; 30; 20 MG/100ML; MG/100ML; MG/100ML; MG/100ML
INJECTION, SOLUTION INTRAVENOUS CONTINUOUS PRN
Status: DISCONTINUED | OUTPATIENT
Start: 2023-12-26 | End: 2023-12-26

## 2023-12-26 RX ORDER — FENTANYL CITRATE 50 UG/ML
INJECTION, SOLUTION INTRAMUSCULAR; INTRAVENOUS PRN
Status: DISCONTINUED | OUTPATIENT
Start: 2023-12-26 | End: 2023-12-26

## 2023-12-26 RX ORDER — LIDOCAINE 40 MG/G
CREAM TOPICAL
Status: DISCONTINUED | OUTPATIENT
Start: 2023-12-26 | End: 2023-12-26 | Stop reason: HOSPADM

## 2023-12-26 RX ORDER — CEFAZOLIN SODIUM/WATER 3 G/30 ML
3 SYRINGE (ML) INTRAVENOUS
Status: DISCONTINUED | OUTPATIENT
Start: 2023-12-26 | End: 2023-12-26 | Stop reason: HOSPADM

## 2023-12-26 RX ORDER — FENTANYL CITRATE 50 UG/ML
25 INJECTION, SOLUTION INTRAMUSCULAR; INTRAVENOUS
Status: DISCONTINUED | OUTPATIENT
Start: 2023-12-26 | End: 2023-12-26 | Stop reason: HOSPADM

## 2023-12-26 RX ORDER — ACETAMINOPHEN 325 MG/1
650 TABLET ORAL
Status: DISCONTINUED | OUTPATIENT
Start: 2023-12-26 | End: 2023-12-26 | Stop reason: HOSPADM

## 2023-12-26 RX ORDER — OXYCODONE HYDROCHLORIDE 5 MG/1
10 TABLET ORAL
Status: COMPLETED | OUTPATIENT
Start: 2023-12-26 | End: 2023-12-26

## 2023-12-26 RX ORDER — LIDOCAINE HYDROCHLORIDE AND EPINEPHRINE 10; 10 MG/ML; UG/ML
INJECTION, SOLUTION INFILTRATION; PERINEURAL PRN
Status: DISCONTINUED | OUTPATIENT
Start: 2023-12-26 | End: 2023-12-26 | Stop reason: HOSPADM

## 2023-12-26 RX ADMIN — FENTANYL CITRATE 25 MCG: 50 INJECTION, SOLUTION INTRAMUSCULAR; INTRAVENOUS at 17:22

## 2023-12-26 RX ADMIN — Medication 100 MG: at 13:14

## 2023-12-26 RX ADMIN — ONDANSETRON 4 MG: 2 INJECTION INTRAMUSCULAR; INTRAVENOUS at 17:25

## 2023-12-26 RX ADMIN — SODIUM CHLORIDE, POTASSIUM CHLORIDE, SODIUM LACTATE AND CALCIUM CHLORIDE: 600; 310; 30; 20 INJECTION, SOLUTION INTRAVENOUS at 12:53

## 2023-12-26 RX ADMIN — LIDOCAINE HYDROCHLORIDE 30 MG: 10 INJECTION, SOLUTION INFILTRATION; PERINEURAL at 13:14

## 2023-12-26 RX ADMIN — OXYCODONE HYDROCHLORIDE 10 MG: 5 TABLET ORAL at 18:05

## 2023-12-26 RX ADMIN — FENTANYL CITRATE 100 MCG: 50 INJECTION INTRAMUSCULAR; INTRAVENOUS at 13:43

## 2023-12-26 RX ADMIN — PROPOFOL 160 MG: 10 INJECTION, EMULSION INTRAVENOUS at 13:14

## 2023-12-26 RX ADMIN — FENTANYL CITRATE 50 MCG: 50 INJECTION, SOLUTION INTRAMUSCULAR; INTRAVENOUS at 17:14

## 2023-12-26 RX ADMIN — DEXAMETHASONE SODIUM PHOSPHATE 10 MG: 10 INJECTION INTRAMUSCULAR; INTRAVENOUS at 12:30

## 2023-12-26 RX ADMIN — FENTANYL CITRATE 100 MCG: 50 INJECTION INTRAMUSCULAR; INTRAVENOUS at 13:14

## 2023-12-26 RX ADMIN — SODIUM CHLORIDE, POTASSIUM CHLORIDE, SODIUM LACTATE AND CALCIUM CHLORIDE: 600; 310; 30; 20 INJECTION, SOLUTION INTRAVENOUS at 12:26

## 2023-12-26 RX ADMIN — Medication 3 G: at 13:20

## 2023-12-26 RX ADMIN — ACETAMINOPHEN 650 MG: 325 TABLET ORAL at 18:05

## 2023-12-26 RX ADMIN — DEXAMETHASONE SODIUM PHOSPHATE 10 MG: 4 INJECTION, SOLUTION INTRA-ARTICULAR; INTRALESIONAL; INTRAMUSCULAR; INTRAVENOUS; SOFT TISSUE at 13:41

## 2023-12-26 RX ADMIN — ACETAMINOPHEN 975 MG: 325 TABLET ORAL at 12:30

## 2023-12-26 RX ADMIN — PROCHLORPERAZINE EDISYLATE 5 MG: 5 INJECTION INTRAMUSCULAR; INTRAVENOUS at 18:06

## 2023-12-26 RX ADMIN — ONDANSETRON 4 MG: 2 INJECTION INTRAMUSCULAR; INTRAVENOUS at 15:54

## 2023-12-26 RX ADMIN — FENTANYL CITRATE 25 MCG: 50 INJECTION, SOLUTION INTRAMUSCULAR; INTRAVENOUS at 17:08

## 2023-12-26 ASSESSMENT — ACTIVITIES OF DAILY LIVING (ADL)
ADLS_ACUITY_SCORE: 35

## 2023-12-26 NOTE — BRIEF OP NOTE
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]  Post-operative diagnosis Same as pre-operative diagnosis    Procedure: RIGHT SUPERFICIAL PAROTIDECTOMY, Right - Head    Surgeon: Surgeon(s) and Role:     * Peggy Reese MD - Primary     * Leah Beach PA-C  Anesthesia: General   Estimated Blood Loss: Less than 50 ml    Drains: Sam-Ramirez  Specimens:   ID Type Source Tests Collected by Time Destination   1 : Right inferior superficial parotid, stitch superior superficial posterior, section tissue to ensure mass present Tissue Parotid Gland, Right SURGICAL PATHOLOGY EXAM Peggy Reese MD 12/26/2023  3:25 PM    2 : Right kedar parotid tissue Tissue Parotid Gland, Right SURGICAL PATHOLOGY EXAM Peggy Reese MD 12/26/2023  3:49 PM      Findings:   Small mass inferior right parotid .  Complications: none.  Implants: * No implants in log *

## 2023-12-26 NOTE — ANESTHESIA PROCEDURE NOTES
Airway       Patient location during procedure: OR       Procedure Start/Stop Times: 12/26/2023 1:16 PM  Staff -        CRNA: Archie Paiz APRN CRNA       Performed By: CRNA  Consent for Airway        Urgency: elective  Indications and Patient Condition       Indications for airway management: kedar-procedural       Induction type:intravenous       Mask difficulty assessment: 1 - vent by mask    Final Airway Details       Final airway type: endotracheal airway       Successful airway: ETT - single  Endotracheal Airway Details        ETT size (mm): 7.5       Cuffed: yes       Successful intubation technique: direct laryngoscopy       DL Blade Type: Valentino 2       Grade View of Cords: 1       Adjucts: stylet       Position: Left       Measured from: lips       Secured at (cm): 22       Bite block used: None    Post intubation assessment        Placement verified by: capnometry, equal breath sounds and chest rise        Number of attempts at approach: 1       Number of other approaches attempted: 0       Secured with: tape       Ease of procedure: easy       Dentition: Intact and Unchanged       Dental guard used and removed. Dental Guard Type: Standard White.    Medication(s) Administered   Medication Administration Time: 12/26/2023 1:16 PM

## 2023-12-26 NOTE — ANESTHESIA CARE TRANSFER NOTE
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    Anesthesia Type:   General     Note:    Oropharynx: oropharynx clear of all foreign objects and spontaneously breathing  Level of Consciousness: drowsy  Oxygen Supplementation: face mask  Level of Supplemental Oxygen (L/min / FiO2): 8  Independent Airway: airway patency satisfactory and stable    Vital Signs Stable: post-procedure vital signs reviewed and stable  Report to RN Given: handoff report given  Patient transferred to: PACU    Handoff Report: Identifed the Patient, Identified the Reponsible Provider, Reviewed the pertinent medical history, Discussed the surgical course, Reviewed Intra-OP anesthesia mangement and issues during anesthesia, Set expectations for post-procedure period and Allowed opportunity for questions and acknowledgement of understanding      Vitals:  Vitals Value Taken Time   BP     Temp     Pulse     Resp     SpO2         Electronically Signed By: CICI Hines CRNA  December 26, 2023  4:23 PM

## 2023-12-26 NOTE — ANESTHESIA POSTPROCEDURE EVALUATION
Patient: Juanito Delacruz    Procedure: Procedure(s):  RIGHT SUPERFICIAL PAROTIDECTOMY       Anesthesia Type:  General    Note:  Disposition: Outpatient   Postop Pain Control: Uneventful            Sign Out: Well controlled pain   PONV: No   Neuro/Psych: Uneventful            Sign Out: Acceptable/Baseline neuro status   Airway/Respiratory: Uneventful            Sign Out: Acceptable/Baseline resp. status   CV/Hemodynamics: Uneventful            Sign Out: Acceptable CV status; No obvious hypovolemia; No obvious fluid overload   Other NRE:    DID A NON-ROUTINE EVENT OCCUR?            Last vitals:  Vitals Value Taken Time   /91 12/26/23 1700   Temp 36.3  C (97.4  F) 12/26/23 1627   Pulse 94 12/26/23 1703   Resp 19 12/26/23 1703   SpO2 93 % 12/26/23 1703   Vitals shown include unfiled device data.    Electronically Signed By: Shamika Bucio MD  December 26, 2023  5:04 PM

## 2023-12-26 NOTE — DISCHARGE INSTRUCTIONS
Follow up in with [facility] for drain removal when drain output is <20cc in a 24 hour period (likely in 2-3 days). Follow up a second time in approximately 1 week for suture removal.

## 2023-12-26 NOTE — ANESTHESIA PREPROCEDURE EVALUATION
Anesthesia Pre-Procedure Evaluation    Patient: Juanito Delacruz   MRN: [id number] : 1965        Procedure : Procedure(s):  RIGHT SUPERFICIAL PAROTIDECTOMY          Past Medical History:   Diagnosis Date    Cervicalgia     Non-specific Cervical Spine Pain    Diabetes mellitus, type 2 (H)     Diabetic ketoacidosis without coma associated with type 2 diabetes mellitus (H)     Fatty liver     History of colonic polyps     Kidney stone     Lumbago     Mechanical Low Back Pain    Morbid obesity (H)     Muscular wasting and disuse atrophy, not elsewhere classified     Deconditioning Syndrome    Parotid nodule     Sleep apnea     cpap    Unspecified chronic suppurative otitis media     Ureteral stone       Past Surgical History:   Procedure Laterality Date    CYSTOURETEROSCOPY, WITH LITHOTRIPSY USING LETY 120P LASER AND URETERAL STENT INSERTION Right 2023    Procedure: CYSTOSCOPY, RIGHT RETROGRADE PYELOGRAM, RIGHT URETEROSCOPY WITH LASER STANDBY, RIGHT URETERAL STENT PLACEMENT;  Surgeon: Reginaldo Pritchett MD;  Location: [facility] OR    NO PAST SURGERIES        No Known Allergies   Social History     Tobacco Use    Smoking status: Never    Smokeless tobacco: Never   Substance Use Topics    Alcohol use: Yes     Comment: Alcoholic Drinks/day: rare      Wt Readings from Last 1 Encounters:   23 123 kg (271 lb 1.6 oz)        Anesthesia Evaluation            ROS/MED HX  ENT/Pulmonary:     (+) sleep apnea, uses CPAP,                                      Neurologic:  - neg neurologic ROS     Cardiovascular:  - neg cardiovascular ROS     METS/Exercise Tolerance:     Hematologic:       Musculoskeletal:       GI/Hepatic:     (+)             liver disease,       Renal/Genitourinary:     (+) renal disease, type: CRI,            Endo:     (+)  type II DM,             Obesity (bmi 40),       Psychiatric/Substance Use:       Infectious Disease:       Malignancy:       Other:            Physical Exam    Airway       "  Mallampati: II   TM distance: > 3 FB   Neck ROM: full   Mouth opening: > 3 cm    Respiratory Devices and Support         Dental           Cardiovascular          Rhythm and rate: regular     Pulmonary           breath sounds clear to auscultation           OUTSIDE LABS:  CBC:   Lab Results   Component Value Date    WBC 8.0 12/01/2023    WBC 7.3 10/23/2023    HGB 15.7 12/01/2023    HGB 15.4 10/23/2023    HCT 44.3 12/01/2023    HCT 43.9 10/23/2023     12/01/2023     10/23/2023     BMP:   Lab Results   Component Value Date     12/01/2023     10/23/2023    POTASSIUM 4.2 12/01/2023    POTASSIUM 4.7 10/23/2023    CHLORIDE 101 12/01/2023    CHLORIDE 102 10/23/2023    CO2 27 12/01/2023    CO2 26 10/23/2023    BUN 15.0 12/01/2023    BUN 19.9 10/23/2023    CR 1.07 12/01/2023    CR 1.24 (H) 10/23/2023    GLC 98 12/12/2023     (H) 12/12/2023     COAGS: No results found for: \"PTT\", \"INR\", \"FIBR\"  POC: No results found for: \"BGM\", \"HCG\", \"HCGS\"  HEPATIC:   Lab Results   Component Value Date    ALBUMIN 4.5 03/28/2023    PROTTOTAL 7.2 03/28/2023    ALT 77 (H) 03/28/2023    AST 44 03/28/2023    ALKPHOS 55 03/28/2023    BILITOTAL 0.3 03/28/2023     OTHER:   Lab Results   Component Value Date    A1C 5.7 (H) 10/23/2023    MARYANNE 9.1 12/01/2023    TSH 1.02 09/28/2022    SED 7 03/28/2023       Anesthesia Plan    ASA Status:  2    NPO Status:  NPO Appropriate    Anesthesia Type: General.     - Airway: ETT         Techniques and Equipment:     - Airway: Video-Laryngoscope       Consents    Anesthesia Plan(s) and associated risks, benefits, and realistic alternatives discussed. Questions answered and patient/representative(s) expressed understanding.     - Discussed: Risks, Benefits and Alternatives for BOTH SEDATION and the PROCEDURE were discussed     - Discussed with:  Patient            Postoperative Care            Comments:               Shamika Bucio MD      "

## 2023-12-27 NOTE — OR NURSING
Patient in Phase II s/p right superficial parotidectomy-right with AHMET bulb intact. Complaining of nausea, patient was given 4 mg of Zofran in PACU. This RN administered 5 mg Compazine, accupressure sea bands, peppermint aromatherapy, and ice pack applied.     Patient to be discharged with AHMET bulb. RN will educate patient and his wife, Dara and will send home extra specimen cups for emptying.

## 2023-12-27 NOTE — OP NOTE
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  Needle aspirate had shown oncocytic cells but did not yield a definitive diagnosis.  CT confirmed a lesion in the inferior aspect of the right parotid.  Risk, benefits, and expectations of surgical resection were discussed and consent obtained.    The patient was brought to the operating room and positively identified.  General anesthesia was induced without difficulty.  Neuromonitoring electrodes were placed in the orbicularis oris and orbicularis oculi and grounding electrodes were placed over the sternum, and the nerve monitoring system was set up with normal impedance values and normal tap test.  He was placed on a shoulder roll.  Right parotidectomy incision was marked out.  A surgical briefing and timeout were performed and surgery site confirmed.  1% lidocaine 1-100,000 epinephrine was injected into the parotidectomy incision and anterior subcutaneous plane.  He was prepped and draped in a sterile manner, exposing the right side of the face for monitoring.    An incision was made with a 15 blade.  Skin flap was raised anteriorly using Metzenbaum scissors to dissect and a LigaSure to cauterize.  Lone Star retractors were placed.  The earlobe was dissected away from surrounding tissue and a 2-0 silk stitch was placed in the earlobe and used to retract.  Inferiorly, a dissection was taken along the  posterior edge of the parotid down to the sternocleidomastoid which was then traced up to the mastoid.  Superiorly, the tragus was dissected down to the tragal pointer.  As the dissection was taken anterior to the mastoid, a Mily facial nerve dissector was used to dissect the tissue layer by layer, and a LigaSure or bipolar were used to come through the tissue.  There were several bridging veins and a couple of small arteries over the expected course of the facial nerve.  These were divided and ligated with 3-0 silk ties.  The facial nerve was then identified in its anatomic position both visually and confirmed with a nerve stimulator.  The nerve was then dissected from proximal to distal.  The takeoff of the superior division was exposed, but individual branches did not have to be dissected out due to the inferior nature of the tumor.  The branches of the inferior division of the facial nerve were dissected out as the parotid was rolled from posterior to anterior off of the nerve.  The dissection was then taken through parotid fascia and inferiorly it was taken down to the edge of the parotid gland.  The stitch was used to katlyn orientation before that I inferior portion of the superficial parotid was removed.  There was a palpable nodule in the expected location, but it was not quite as large or distinct as anticipated.  Surrounding parotid tissue was fairly firm as well.  I sent the tissue to pathology for it to be cut into to ensure that they felt that the tumor was in the expected location which it was.  At this point, all exposed branches of the facial nerve stimulated normally.  There were no other palpable lesions still in the neck.  The neck was irrigated out copiously.  There was what appeared to be a mildly prominent lymph node just inferior to where the parotid been.  This was dissected out with a LigaSure and sent as periparotid tissue for routine pathology.  A 15 Talat drain was then placed and  secured with a drain stitch.  One 3-0 Vicryl was placed between the superficial parotid fascia and the digastric to smooth contour.  The skin was closed in a layered manner, with deep 4-0 Vicryl sutures and then a running locking 5-0 nylon on the skin.  The inferior most aspect of the surgical site had enough of a soft tissue defect and redundancy of skin to cause fairly deep dimpling of the skin.  Thus, redundant skin was excised with the Metzenbaum scissors in order to create a smoother contour.  This aspect was then also closed with 4-0 Vicryl and 5-0 nylon.  The patient then awoke from anesthesia without difficulty and was transported in stable condition to the postanesthesia care unit.

## 2023-12-29 LAB
PATH REPORT.COMMENTS IMP SPEC: NORMAL
PATH REPORT.COMMENTS IMP SPEC: NORMAL
PATH REPORT.FINAL DX SPEC: NORMAL
PATH REPORT.GROSS SPEC: NORMAL
PATH REPORT.MICROSCOPIC SPEC OTHER STN: NORMAL
PATH REPORT.RELEVANT HX SPEC: NORMAL
PHOTO IMAGE: NORMAL

## 2023-12-29 PROCEDURE — 88305 TISSUE EXAM BY PATHOLOGIST: CPT | Mod: 26 | Performed by: PATHOLOGY

## 2023-12-29 PROCEDURE — 88307 TISSUE EXAM BY PATHOLOGIST: CPT | Mod: 26 | Performed by: PATHOLOGY

## 2024-01-02 ENCOUNTER — OFFICE VISIT (OUTPATIENT)
Dept: FAMILY MEDICINE | Facility: CLINIC | Age: 59
End: 2024-01-02
Payer: COMMERCIAL

## 2024-01-02 VITALS
HEIGHT: 70 IN | SYSTOLIC BLOOD PRESSURE: 120 MMHG | WEIGHT: 270 LBS | DIASTOLIC BLOOD PRESSURE: 74 MMHG | BODY MASS INDEX: 38.65 KG/M2 | HEART RATE: 80 BPM | RESPIRATION RATE: 16 BRPM | TEMPERATURE: 98 F | OXYGEN SATURATION: 100 %

## 2024-01-02 DIAGNOSIS — G47.33 OSA ON CPAP: ICD-10-CM

## 2024-01-02 DIAGNOSIS — R79.89 LOW TESTOSTERONE: ICD-10-CM

## 2024-01-02 DIAGNOSIS — N20.0 KIDNEY STONE: ICD-10-CM

## 2024-01-02 DIAGNOSIS — Z01.818 PREOP GENERAL PHYSICAL EXAM: Primary | ICD-10-CM

## 2024-01-02 DIAGNOSIS — E11.65 TYPE 2 DIABETES MELLITUS WITH HYPERGLYCEMIA, WITHOUT LONG-TERM CURRENT USE OF INSULIN (H): ICD-10-CM

## 2024-01-02 LAB
ERYTHROCYTE [DISTWIDTH] IN BLOOD BY AUTOMATED COUNT: 12.8 % (ref 10–15)
HBA1C MFR BLD: 5.7 % (ref 0–5.6)
HCT VFR BLD AUTO: 44.9 % (ref 40–53)
HGB BLD-MCNC: 15.1 G/DL (ref 13.3–17.7)
MCH RBC QN AUTO: 30.8 PG (ref 26.5–33)
MCHC RBC AUTO-ENTMCNC: 33.6 G/DL (ref 31.5–36.5)
MCV RBC AUTO: 92 FL (ref 78–100)
PLATELET # BLD AUTO: 178 10E3/UL (ref 150–450)
RBC # BLD AUTO: 4.9 10E6/UL (ref 4.4–5.9)
WBC # BLD AUTO: 10.7 10E3/UL (ref 4–11)

## 2024-01-02 PROCEDURE — 99214 OFFICE O/P EST MOD 30 MIN: CPT | Performed by: NURSE PRACTITIONER

## 2024-01-02 PROCEDURE — 36415 COLL VENOUS BLD VENIPUNCTURE: CPT | Performed by: NURSE PRACTITIONER

## 2024-01-02 PROCEDURE — 85027 COMPLETE CBC AUTOMATED: CPT | Performed by: NURSE PRACTITIONER

## 2024-01-02 PROCEDURE — 80053 COMPREHEN METABOLIC PANEL: CPT | Performed by: NURSE PRACTITIONER

## 2024-01-02 PROCEDURE — 83036 HEMOGLOBIN GLYCOSYLATED A1C: CPT | Performed by: NURSE PRACTITIONER

## 2024-01-02 RX ORDER — OXYCODONE HYDROCHLORIDE 5 MG/1
5 TABLET ORAL EVERY 6 HOURS PRN
COMMUNITY
End: 2024-04-05

## 2024-01-02 SDOH — HEALTH STABILITY: PHYSICAL HEALTH: ON AVERAGE, HOW MANY DAYS PER WEEK DO YOU ENGAGE IN MODERATE TO STRENUOUS EXERCISE (LIKE A BRISK WALK)?: 3 DAYS

## 2024-01-02 SDOH — HEALTH STABILITY: PHYSICAL HEALTH: ON AVERAGE, HOW MANY MINUTES DO YOU ENGAGE IN EXERCISE AT THIS LEVEL?: 30 MIN

## 2024-01-02 ASSESSMENT — SOCIAL DETERMINANTS OF HEALTH (SDOH)
HOW OFTEN DO YOU ATTEND CHURCH OR RELIGIOUS SERVICES?: MORE THAN 4 TIMES PER YEAR
DO YOU BELONG TO ANY CLUBS OR ORGANIZATIONS SUCH AS CHURCH GROUPS UNIONS, FRATERNAL OR ATHLETIC GROUPS, OR SCHOOL GROUPS?: YES
HOW OFTEN DO YOU GET TOGETHER WITH FRIENDS OR RELATIVES?: ONCE A WEEK
HOW OFTEN DO YOU ATTENT MEETINGS OF THE CLUB OR ORGANIZATION YOU BELONG TO?: MORE THAN 4 TIMES PER YEAR
IN A TYPICAL WEEK, HOW MANY TIMES DO YOU TALK ON THE PHONE WITH FAMILY, FRIENDS, OR NEIGHBORS?: MORE THAN THREE TIMES A WEEK

## 2024-01-02 ASSESSMENT — LIFESTYLE VARIABLES
AUDIT-C TOTAL SCORE: 1
HOW OFTEN DO YOU HAVE A DRINK CONTAINING ALCOHOL: MONTHLY OR LESS
HOW MANY STANDARD DRINKS CONTAINING ALCOHOL DO YOU HAVE ON A TYPICAL DAY: 1 OR 2
HOW OFTEN DO YOU HAVE SIX OR MORE DRINKS ON ONE OCCASION: NEVER
SKIP TO QUESTIONS 9-10: 1

## 2024-01-02 NOTE — PROGRESS NOTES
[facility]  [address] [address] [address]  Phone: [phone]  Primary Provider: Archie Mosher  Pre-op Performing Provider: HAASE, SUSAN RACHELE    {  PREOPERATIVE EVALUATION:  Today's date: 1/2/2024    Ken is a 58 year old, presenting for the following:  Pre-Op Exam        1/2/2024     2:13 PM   Additional Questions   Roomed by Amina MEDLEY       Surgical Information:  Surgery/Procedure: Cystoscopy right retrograde pyelogram, right ureteroscopy  Surgery Location: [facility]  Surgeon: Dr. Pritchett  Surgery Date: 01/15/2024  Time of Surgery: 10:15 am   Where patient plans to recover: At home with family  Fax number for surgical facility: Note does not need to be faxed, will be available electronically in Epic.    Assessment & Plan     The proposed surgical procedure is considered LOW risk.    Preop general physical exam    - CBC with platelets  - Comprehensive metabolic panel    Kidney stone:  6 mm right ureteral stone,     JUAN on CPAP:  will bring CPAP to surgery    Type 2 diabetes mellitus with hyperglycemia, without long-term current use of insulin (H)  A1C 5.7%, well controlled.       - No identified additional risk factors other than previously addressed    Antiplatelet or Anticoagulation Medication Instructions:   - Patient is on no antiplatelet or anticoagulation medications.    Additional Medication Instructions:   - GLP-1 Injectable (exenitide, liraglutide, semaglutide, dulaglutide, etc.): HOLD 7 days before surgery   Do not take any OTC ibuprofen or aspirin containing medications 7 days prior to surgery, take tylenol only for headaches/pain etc.     RECOMMENDATION:  APPROVAL GIVEN to proceed with proposed procedure, without further diagnostic evaluation.      Subjective       HPI related to upcoming procedure: 6 mm right ureteral stone, had cystoscopy on 12/12 with stent placement, will need to have the stent replaced and possible retrieval of  jefferson.        1/2/2024     2:15 PM   Preop Questions   1. Have you ever had a heart attack or stroke? No   2. Have you ever had surgery on your heart or blood vessels, such as a stent placement, a coronary artery bypass, or surgery on an artery in your head, neck, heart, or legs? No   3. Do you have chest pain with activity? No   4. Do you have a history of  heart failure? No   5. Do you currently have a cold, bronchitis or symptoms of other infection? No   6. Do you have a cough, shortness of breath, or wheezing? No   7. Do you or anyone in your family have previous history of blood clots? No   8. Do you or does anyone in your family have a serious bleeding problem such as prolonged bleeding following surgeries or cuts? No   9. Have you ever had problems with anemia or been told to take iron pills? No   10. Have you had any abnormal blood loss such as black, tarry or bloody stools? No   11. Have you ever had a blood transfusion? No   12. Are you willing to have a blood transfusion if it is medically needed before, during, or after your surgery? Yes   13. Have you or any of your relatives ever had problems with anesthesia? No   14. Do you have sleep apnea, excessive snoring or daytime drowsiness? YES - sleep apnea   14a. Do you have a CPAP machine? Yes   15. Do you have any artifical heart valves or other implanted medical devices like a pacemaker, defibrillator, or continuous glucose monitor? No   16. Do you have artificial joints? No   17. Are you allergic to latex? No     HTN: /74, taking Losartan 25 mg, daily.   Type 2 DM:  taking Ozempic, last dose 12/27/2023.  Last A1C 5.7%.   Right parotidectomy:  had surgery on 12/26/2023, continues to take oxycodone on limited bases for pain control.      Health Care Directive:  Patient does not have a Health Care Directive or Living Will: Discussed advance care planning with patient; however, patient declined at this time.    Preoperative Review of : norco and  oxy   reviewed - controlled substances reflected in medication list.      Review of Systems  CONSTITUTIONAL: NEGATIVE for fever, chills, change in weight  INTEGUMENTARY/SKIN: NEGATIVE for worrisome rashes, moles or lesions  EYES: NEGATIVE for vision changes or irritation  ENT/MOUTH: NEGATIVE for ear, mouth and throat problems  RESP: NEGATIVE for significant cough or SOB  CV: NEGATIVE for chest pain, palpitations or peripheral edema  GI: NEGATIVE for nausea, abdominal pain, heartburn, or change in bowel habits  : Positive for dysuria, right flank pain, hematuria  MUSCULOSKELETAL: NEGATIVE for significant arthralgias or myalgia  NEURO: NEGATIVE for weakness, dizziness or paresthesias  ENDOCRINE: NEGATIVE for temperature intolerance, skin/hair changes  HEME: NEGATIVE for bleeding problems  PSYCHIATRIC: NEGATIVE for changes in mood or affect    Patient Active Problem List    Diagnosis Date Noted    Diabetes mellitus, type 2 (H) 03/30/2023     Priority: Medium    Morbid obesity (H) 06/04/2021     Priority: Medium    History of colonic polyps 12/11/2019     Priority: Medium    JUAN on CPAP 12/11/2019     Priority: Medium    Fatty liver 06/28/2017     Priority: Medium    Cervicalgia 12/29/2005     Priority: Medium     Non-specific Cervical Spine Pain      Lumbago 12/29/2005     Priority: Medium     Mechanical Low Back Pain      Chronic suppurative otitis media 12/07/2005     Priority: Medium     Problem list name updated by automated process. Provider to review        Past Medical History:   Diagnosis Date    Cervicalgia     Non-specific Cervical Spine Pain    Diabetes mellitus, type 2 (H)     Diabetic ketoacidosis without coma associated with type 2 diabetes mellitus (H)     Fatty liver     History of colonic polyps     Kidney stone     Lumbago     Mechanical Low Back Pain    Morbid obesity (H)     Muscular wasting and disuse atrophy, not elsewhere classified     Deconditioning Syndrome    Parotid nodule     Sleep  apnea     cpap    Unspecified chronic suppurative otitis media     Ureteral stone      Past Surgical History:   Procedure Laterality Date    CYSTOURETEROSCOPY, WITH LITHOTRIPSY USING LETY 120P LASER AND URETERAL STENT INSERTION Right 12/12/2023    Procedure: CYSTOSCOPY, RIGHT RETROGRADE PYELOGRAM, RIGHT URETEROSCOPY WITH LASER STANDBY, RIGHT URETERAL STENT PLACEMENT;  Surgeon: Reginaldo Pritchett MD;  [place] [facility]    NO PAST SURGERIES      PAROTIDECTOMY Right 12/26/2023    Procedure: RIGHT SUPERFICIAL PAROTIDECTOMY;  Surgeon: Peggy Reese MD;  [place] [facility]     Current Outpatient Medications   Medication Sig Dispense Refill    acetaminophen (TYLENOL) 500 MG tablet Take 2 tablets (1,000 mg) by mouth every 6 hours as needed for mild pain      albuterol (PROAIR HFA/PROVENTIL HFA/VENTOLIN HFA) 108 (90 Base) MCG/ACT inhaler Inhale 2 puffs into the lungs every 6 hours as needed for shortness of breath, wheezing or cough 18 g 0    losartan (COZAAR) 25 MG tablet Take 1 tablet (25 mg) by mouth daily 90 tablet 0    ondansetron (ZOFRAN) 8 MG tablet Take 8 mg by mouth every 8 hours as needed      oxyBUTYnin (DITROPAN) 5 MG tablet Take 1 tablet (5 mg) by mouth 3 times daily as needed for bladder spasms 30 tablet 1    Semaglutide, 1 MG/DOSE, (OZEMPIC) 4 MG/3ML pen Inject 1 mg Subcutaneous every 7 days 3 mL 3    tamsulosin (FLOMAX) 0.4 MG capsule Take 0.4 mg by mouth daily      testosterone (ANDROGEL 1.62 % PUMP) 20.25 MG/ACT gel Place 40.5 mg onto the skin daily Apply from dispenser to clean, dry, intact skin of the upper arms and shoulders. 75 g 1    Continuous Blood Gluc Sensor (FREESTYLE LUKE 2 SENSOR) MISC Change every 14 days. 6 each 1    multivitamin w/minerals (MULTIVITAMIN, THERAPEUTIC WITH MINERALS) tablet Take 1 tablet by mouth daily (Patient not taking: Reported on 1/2/2024)      STATIN NOT PRESCRIBED (INTENTIONAL) Please choose reason not prescribed from choices below. (Patient not  "taking: Reported on 12/1/2023)         No Known Allergies     Social History     Tobacco Use    Smoking status: Never    Smokeless tobacco: Never   Substance Use Topics    Alcohol use: Yes     Comment: Alcoholic Drinks/day: rare       History   Drug Use Unknown         Objective     /74 (Cuff Size: Adult Large)   Pulse 80   Temp 98  F (36.7  C) (Oral)   Resp 16   Ht 1.784 m (5' 10.25\")   Wt 122.5 kg (270 lb)   SpO2 100%   BMI 38.47 kg/m      Physical Exam    GENERAL APPEARANCE: healthy, alert and no distress     HENT: ear canals and TM's normal and nose and mouth without ulcers or lesions, right side of face from ear to jaw with incision that has sutures in place, clean, dry and intact.      NECK: no adenopathy, no asymmetry, masses, or scars and thyroid normal to palpation     RESP: lungs clear to auscultation - no rales, rhonchi or wheezes     CV: regular rates and rhythm, normal S1 S2, no S3 or S4 and no murmur, click or rub     ABDOMEN:  soft, nontender, no HSM or masses and bowel sounds normal     MS: extremities normal- no gross deformities noted,      SKIN: no suspicious lesions or rashes     NEURO: Normal strength and tone, sensory exam grossly normal, mentation intact and speech normal     PSYCH: mentation appears normal. and affect normal/bright     LYMPHATICS: No cervical adenopathy    Recent Labs   Lab Test 12/01/23  1257 10/23/23  1539 06/13/23  1435   HGB 15.7 15.4  --     174  --     140 139   POTASSIUM 4.2 4.7 4.6   CR 1.07 1.24* 1.09   A1C  --  5.7* 6.3*        Diagnostics:  Recent Results (from the past 24 hour(s))   CBC with platelets    Collection Time: 01/02/24  3:05 PM   Result Value Ref Range    WBC Count 10.7 4.0 - 11.0 10e3/uL    RBC Count 4.90 4.40 - 5.90 10e6/uL    Hemoglobin 15.1 13.3 - 17.7 g/dL    Hematocrit 44.9 40.0 - 53.0 %    MCV 92 78 - 100 fL    MCH 30.8 26.5 - 33.0 pg    MCHC 33.6 31.5 - 36.5 g/dL    RDW 12.8 10.0 - 15.0 %    Platelet Count 178 150 - 450 " 10e3/uL      No EKG this visit, completed in the last 90 days.    Revised Cardiac Risk Index (RCRI):  The patient has the following serious cardiovascular risks for perioperative complications:   - No serious cardiac risks = 0 points     RCRI Interpretation: 0 points: Class I (very low risk - 0.4% complication rate)         Signed Electronically by: Susan Haase, APRN CNP  Copy of this evaluation report is provided to requesting physician.

## 2024-01-02 NOTE — PATIENT INSTRUCTIONS
Preparing for Your Surgery  Getting started  A nurse will call you to review your health history and instructions. They will give you an arrival time based on your scheduled surgery time. Please be ready to share:  Your doctor's clinic name and phone number  Your medical, surgical, and anesthesia history  A list of allergies and sensitivities  A list of medicines, including herbal treatments and over-the-counter drugs  Whether the patient has a legal guardian (ask how to send us the papers in advance)  Please tell us if you're pregnant--or if there's any chance you might be pregnant. Some surgeries may injure a fetus (unborn baby), so they require a pregnancy test. Surgeries that are safe for a fetus don't always need a test, and you can choose whether to have one.   If you have a child who's having surgery, please ask for a copy of Preparing for Your Child's Surgery.    Preparing for surgery  Within 10 to 30 days of surgery: Have a pre-op exam (sometimes called an H&P, or History and Physical). This can be done at a clinic or pre-operative center.  If you're having a , you may not need this exam. Talk to your care team.  At your pre-op exam, talk to your care team about all medicines you take. If you need to stop any medicines before surgery, ask when to start taking them again.  We do this for your safety. Many medicines can make you bleed too much during surgery. Some change how well surgery (anesthesia) drugs work.  Call your insurance company to let them know you're having surgery. (If you don't have insurance, call [phone].)  Call your clinic if there's any change in your health. This includes signs of a cold or flu (sore throat, runny nose, cough, rash, fever). It also includes a scrape or scratch near the surgery site.  If you have questions on the day of surgery, call your hospital or surgery center.  Eating and drinking guidelines  For your safety: Unless your surgeon tells you otherwise,  follow the guidelines below.  Eat and drink as usual until 8 hours before you arrive for surgery. After that, no food or milk.  Drink clear liquids until 2 hours before you arrive. These are liquids you can see through, like water, Gatorade, and Propel Water. They also include plain black coffee and tea (no cream or milk), candy, and breath mints. You can spit out gum when you arrive.  If you drink alcohol: Stop drinking it the night before surgery.  If your care team tells you to take medicine on the morning of surgery, it's okay to take it with a sip of water.  Preventing infection  Shower or bathe the night before and morning of your surgery. Follow the instructions your clinic gave you. (If no instructions, use regular soap.)  Don't shave or clip hair near your surgery site. We'll remove the hair if needed.  Don't smoke or vape the morning of surgery. You may chew nicotine gum up to 2 hours before surgery. A nicotine patch is okay.  Note: Some surgeries require you to completely quit smoking and nicotine. Check with your surgeon.  Your care team will make every effort to keep you safe from infection. We will:  Clean our hands often with soap and water (or an alcohol-based hand rub).  Clean the skin at your surgery site with a special soap that kills germs.  Give you a special gown to keep you warm. (Cold raises the risk of infection.)  Wear special hair covers, masks, gowns and gloves during surgery.  Give antibiotic medicine, if prescribed. Not all surgeries need antibiotics.  What to bring on the day of surgery  Photo ID and insurance card  Copy of your health care directive, if you have one  Glasses and hearing aids (bring cases)  You can't wear contacts during surgery  Inhaler and eye drops, if you use them (tell us about these when you arrive)  CPAP machine or breathing device, if you use them  A few personal items, if spending the night  If you have . . .  A pacemaker, ICD (cardiac defibrillator) or other  implant: Bring the ID card.  An implanted stimulator: Bring the remote control.  A legal guardian: Bring a copy of the certified (court-stamped) guardianship papers.  Please remove any jewelry, including body piercings. Leave jewelry and other valuables at home.  If you're going home the day of surgery  You must have a responsible adult drive you home. They should stay with you overnight as well.  If you don't have someone to stay with you, and you aren't safe to go home alone, we may keep you overnight. Insurance often won't pay for this.  After surgery  If it's hard to control your pain or you need more pain medicine, please call your surgeon's office.  Questions?   If you have any questions for your care team, list them here: _________________________________________________________________________________________________________________________________________________________________________ ____________________________________ ____________________________________ ____________________________________  For informational purposes only. Not to replace the advice of your health care provider. Copyright   2003, 2019 [facility]. All rights reserved. Clinically reviewed by Shayna Gage MD. SMARTworks 581815 - REV 12/22.

## 2024-01-02 NOTE — COMMUNITY RESOURCES LIST (ENGLISH)
01/02/2024    [employer] [place] [employer]  N/A  For questions about this resource list or additional care needs, please contact your primary care clinic or care manager.  Phone: [phone]   Email: N/A   Address: [address] [place] [id number]   Hours: N/A        Financial Stability       Rent and mortgage payment assistance  1  [facility] ([facility] Office - Emergency Housing Assistance - Rent and mortgage payment assistance Distance: 11.51 miles      In-Person   [address] [place] [address]  Language: English  Hours: Mon - Fri 8:00 AM - 4:30 PM  Fees: Free   Phone: (123) 975-2653 Email: troy@[facility]Pages[facility]phion Website: https://www.[facility]spect[facility]ise/agency-information     2  [employer] Distance: 11.84 miles      In-Person, Phone/Virtual   [address] [place] [address]  Language: English  Hours: Mon - Sun Appt. Only  Fees: Free   Phone: (261) 204-2088 Email: shamir@[employer] Website: http://www.communityhelpinghand.org          Important Numbers & Websites       Emergency Services   911  City Services   311  Poison Control   (253) 552-2537  Suicide Prevention Lifeline   (238) 362-9659 (TALK)  Child Abuse Hotline   (436) 462-4239 (4-A-Child)  Sexual Assault Hotline   (807) 169-2318 (HOPE)  National Runaway Safeline   (951) 818-4758 (RUNAWAY)  All-Options Talkline   (478) 445-1122  Substance Abuse Referral   (490) 426-6197 (HELP)

## 2024-01-02 NOTE — H&P (VIEW-ONLY)
[facility]  [address] [facility] [address]  Phone: [phone]  Primary Provider: Archie Mosher  Pre-op Performing Provider: HAASE, SUSAN RACHELE    {  PREOPERATIVE EVALUATION:  Today's date: 1/2/2024    Ken is a 58 year old, presenting for the following:  Pre-Op Exam        1/2/2024     2:13 PM   Additional Questions   Roomed by Amina MEDLEY       Surgical Information:  Surgery/Procedure: Cystoscopy right retrograde pyelogram, right ureteroscopy  Surgery Location: [facility]'s  Surgeon: Dr. Pritchett  Surgery Date: 01/15/2024  Time of Surgery: 10:15 am   Where patient plans to recover: At home with family  Fax number for surgical facility: Note does not need to be faxed, will be available electronically in Epic.    Assessment & Plan     The proposed surgical procedure is considered LOW risk.    Preop general physical exam    - CBC with platelets  - Comprehensive metabolic panel    Kidney stone:  6 mm right ureteral stone,     JUAN on CPAP:  will bring CPAP to surgery    Type 2 diabetes mellitus with hyperglycemia, without long-term current use of insulin (H)  A1C 5.7%, well controlled.       - No identified additional risk factors other than previously addressed    Antiplatelet or Anticoagulation Medication Instructions:   - Patient is on no antiplatelet or anticoagulation medications.    Additional Medication Instructions:   - GLP-1 Injectable (exenitide, liraglutide, semaglutide, dulaglutide, etc.): HOLD 7 days before surgery   Do not take any OTC ibuprofen or aspirin containing medications 7 days prior to surgery, take tylenol only for headaches/pain etc.     RECOMMENDATION:  APPROVAL GIVEN to proceed with proposed procedure, without further diagnostic evaluation.      Subjective       HPI related to upcoming procedure: 6 mm right ureteral stone, had cystoscopy on 12/12 with stent placement, will need to have the stent replaced and possible retrieval of  jefferson.        1/2/2024     2:15 PM   Preop Questions   1. Have you ever had a heart attack or stroke? No   2. Have you ever had surgery on your heart or blood vessels, such as a stent placement, a coronary artery bypass, or surgery on an artery in your head, neck, heart, or legs? No   3. Do you have chest pain with activity? No   4. Do you have a history of  heart failure? No   5. Do you currently have a cold, bronchitis or symptoms of other infection? No   6. Do you have a cough, shortness of breath, or wheezing? No   7. Do you or anyone in your family have previous history of blood clots? No   8. Do you or does anyone in your family have a serious bleeding problem such as prolonged bleeding following surgeries or cuts? No   9. Have you ever had problems with anemia or been told to take iron pills? No   10. Have you had any abnormal blood loss such as black, tarry or bloody stools? No   11. Have you ever had a blood transfusion? No   12. Are you willing to have a blood transfusion if it is medically needed before, during, or after your surgery? Yes   13. Have you or any of your relatives ever had problems with anesthesia? No   14. Do you have sleep apnea, excessive snoring or daytime drowsiness? YES - sleep apnea   14a. Do you have a CPAP machine? Yes   15. Do you have any artifical heart valves or other implanted medical devices like a pacemaker, defibrillator, or continuous glucose monitor? No   16. Do you have artificial joints? No   17. Are you allergic to latex? No     HTN: /74, taking Losartan 25 mg, daily.   Type 2 DM:  taking Ozempic, last dose 12/27/2023.  Last A1C 5.7%.   Right parotidectomy:  had surgery on 12/26/2023, continues to take oxycodone on limited bases for pain control.      Health Care Directive:  Patient does not have a Health Care Directive or Living Will: Discussed advance care planning with patient; however, patient declined at this time.    Preoperative Review of : norco and  oxy   reviewed - controlled substances reflected in medication list.      Review of Systems  CONSTITUTIONAL: NEGATIVE for fever, chills, change in weight  INTEGUMENTARY/SKIN: NEGATIVE for worrisome rashes, moles or lesions  EYES: NEGATIVE for vision changes or irritation  ENT/MOUTH: NEGATIVE for ear, mouth and throat problems  RESP: NEGATIVE for significant cough or SOB  CV: NEGATIVE for chest pain, palpitations or peripheral edema  GI: NEGATIVE for nausea, abdominal pain, heartburn, or change in bowel habits  : Positive for dysuria, right flank pain, hematuria  MUSCULOSKELETAL: NEGATIVE for significant arthralgias or myalgia  NEURO: NEGATIVE for weakness, dizziness or paresthesias  ENDOCRINE: NEGATIVE for temperature intolerance, skin/hair changes  HEME: NEGATIVE for bleeding problems  PSYCHIATRIC: NEGATIVE for changes in mood or affect    Patient Active Problem List    Diagnosis Date Noted    Diabetes mellitus, type 2 (H) 03/30/2023     Priority: Medium    Morbid obesity (H) 06/04/2021     Priority: Medium    History of colonic polyps 12/11/2019     Priority: Medium    JUAN on CPAP 12/11/2019     Priority: Medium    Fatty liver 06/28/2017     Priority: Medium    Cervicalgia 12/29/2005     Priority: Medium     Non-specific Cervical Spine Pain      Lumbago 12/29/2005     Priority: Medium     Mechanical Low Back Pain      Chronic suppurative otitis media 12/07/2005     Priority: Medium     Problem list name updated by automated process. Provider to review        Past Medical History:   Diagnosis Date    Cervicalgia     Non-specific Cervical Spine Pain    Diabetes mellitus, type 2 (H)     Diabetic ketoacidosis without coma associated with type 2 diabetes mellitus (H)     Fatty liver     History of colonic polyps     Kidney stone     Lumbago     Mechanical Low Back Pain    Morbid obesity (H)     Muscular wasting and disuse atrophy, not elsewhere classified     Deconditioning Syndrome    Parotid nodule     Sleep  apnea     cpap    Unspecified chronic suppurative otitis media     Ureteral stone      Past Surgical History:   Procedure Laterality Date    CYSTOURETEROSCOPY, WITH LITHOTRIPSY USING LETY 120P LASER AND URETERAL STENT INSERTION Right 12/12/2023    Procedure: CYSTOSCOPY, RIGHT RETROGRADE PYELOGRAM, RIGHT URETEROSCOPY WITH LASER STANDBY, RIGHT URETERAL STENT PLACEMENT;  Surgeon: Reginaldo Pritchett MD;  Location: [facility] OR    NO PAST SURGERIES      PAROTIDECTOMY Right 12/26/2023    Procedure: RIGHT SUPERFICIAL PAROTIDECTOMY;  Surgeon: Peggy Reese MD;  Location: [facility] OR     Current Outpatient Medications   Medication Sig Dispense Refill    acetaminophen (TYLENOL) 500 MG tablet Take 2 tablets (1,000 mg) by mouth every 6 hours as needed for mild pain      albuterol (PROAIR HFA/PROVENTIL HFA/VENTOLIN HFA) 108 (90 Base) MCG/ACT inhaler Inhale 2 puffs into the lungs every 6 hours as needed for shortness of breath, wheezing or cough 18 g 0    losartan (COZAAR) 25 MG tablet Take 1 tablet (25 mg) by mouth daily 90 tablet 0    ondansetron (ZOFRAN) 8 MG tablet Take 8 mg by mouth every 8 hours as needed      oxyBUTYnin (DITROPAN) 5 MG tablet Take 1 tablet (5 mg) by mouth 3 times daily as needed for bladder spasms 30 tablet 1    Semaglutide, 1 MG/DOSE, (OZEMPIC) 4 MG/3ML pen Inject 1 mg Subcutaneous every 7 days 3 mL 3    tamsulosin (FLOMAX) 0.4 MG capsule Take 0.4 mg by mouth daily      testosterone (ANDROGEL 1.62 % PUMP) 20.25 MG/ACT gel Place 40.5 mg onto the skin daily Apply from dispenser to clean, dry, intact skin of the upper arms and shoulders. 75 g 1    Continuous Blood Gluc Sensor (FREESTYLE LUKE 2 SENSOR) MISC Change every 14 days. 6 each 1    multivitamin w/minerals (MULTIVITAMIN, THERAPEUTIC WITH MINERALS) tablet Take 1 tablet by mouth daily (Patient not taking: Reported on 1/2/2024)      STATIN NOT PRESCRIBED (INTENTIONAL) Please choose reason not prescribed from choices below. (Patient not  "taking: Reported on 12/1/2023)         No Known Allergies     Social History     Tobacco Use    Smoking status: Never    Smokeless tobacco: Never   Substance Use Topics    Alcohol use: Yes     Comment: Alcoholic Drinks/day: rare       History   Drug Use Unknown         Objective     /74 (Cuff Size: Adult Large)   Pulse 80   Temp 98  F (36.7  C) (Oral)   Resp 16   Ht 1.784 m (5' 10.25\")   Wt 122.5 kg (270 lb)   SpO2 100%   BMI 38.47 kg/m      Physical Exam    GENERAL APPEARANCE: healthy, alert and no distress     HENT: ear canals and TM's normal and nose and mouth without ulcers or lesions, right side of face from ear to jaw with incision that has sutures in place, clean, dry and intact.      NECK: no adenopathy, no asymmetry, masses, or scars and thyroid normal to palpation     RESP: lungs clear to auscultation - no rales, rhonchi or wheezes     CV: regular rates and rhythm, normal S1 S2, no S3 or S4 and no murmur, click or rub     ABDOMEN:  soft, nontender, no HSM or masses and bowel sounds normal     MS: extremities normal- no gross deformities noted,      SKIN: no suspicious lesions or rashes     NEURO: Normal strength and tone, sensory exam grossly normal, mentation intact and speech normal     PSYCH: mentation appears normal. and affect normal/bright     LYMPHATICS: No cervical adenopathy    Recent Labs   Lab Test 12/01/23  1257 10/23/23  1539 06/13/23  1435   HGB 15.7 15.4  --     174  --     140 139   POTASSIUM 4.2 4.7 4.6   CR 1.07 1.24* 1.09   A1C  --  5.7* 6.3*        Diagnostics:  Recent Results (from the past 24 hour(s))   CBC with platelets    Collection Time: 01/02/24  3:05 PM   Result Value Ref Range    WBC Count 10.7 4.0 - 11.0 10e3/uL    RBC Count 4.90 4.40 - 5.90 10e6/uL    Hemoglobin 15.1 13.3 - 17.7 g/dL    Hematocrit 44.9 40.0 - 53.0 %    MCV 92 78 - 100 fL    MCH 30.8 26.5 - 33.0 pg    MCHC 33.6 31.5 - 36.5 g/dL    RDW 12.8 10.0 - 15.0 %    Platelet Count 178 150 - 450 " 10e3/uL      No EKG this visit, completed in the last 90 days.    Revised Cardiac Risk Index (RCRI):  The patient has the following serious cardiovascular risks for perioperative complications:   - No serious cardiac risks = 0 points     RCRI Interpretation: 0 points: Class I (very low risk - 0.4% complication rate)         Signed Electronically by: Susan Haase, APRN CNP  Copy of this evaluation report is provided to requesting physician.

## 2024-01-03 LAB
ALBUMIN SERPL BCG-MCNC: 4.4 G/DL (ref 3.5–5.2)
ALP SERPL-CCNC: 49 U/L (ref 40–150)
ALT SERPL W P-5'-P-CCNC: 31 U/L (ref 0–70)
ANION GAP SERPL CALCULATED.3IONS-SCNC: 12 MMOL/L (ref 7–15)
AST SERPL W P-5'-P-CCNC: 25 U/L (ref 0–45)
BILIRUB SERPL-MCNC: 0.5 MG/DL
BUN SERPL-MCNC: 14.4 MG/DL (ref 8–23)
CALCIUM SERPL-MCNC: 9.4 MG/DL (ref 8.6–10)
CHLORIDE SERPL-SCNC: 103 MMOL/L (ref 98–107)
CREAT SERPL-MCNC: 1.08 MG/DL (ref 0.67–1.17)
DEPRECATED HCO3 PLAS-SCNC: 25 MMOL/L (ref 22–29)
EGFRCR SERPLBLD CKD-EPI 2021: 79 ML/MIN/1.73M2
GLUCOSE SERPL-MCNC: 80 MG/DL (ref 70–99)
POTASSIUM SERPL-SCNC: 4.6 MMOL/L (ref 3.4–5.3)
PROT SERPL-MCNC: 7 G/DL (ref 6.4–8.3)
SODIUM SERPL-SCNC: 140 MMOL/L (ref 135–145)

## 2024-01-03 RX ORDER — TESTOSTERONE 1.62 MG/G
40.5 GEL TRANSDERMAL DAILY
Qty: 75 G | Refills: 1 | Status: SHIPPED | OUTPATIENT
Start: 2024-01-03 | End: 2024-04-10

## 2024-01-15 ENCOUNTER — ANESTHESIA (OUTPATIENT)
Dept: SURGERY | Facility: HOSPITAL | Age: 59
End: 2024-01-15
Payer: COMMERCIAL

## 2024-01-15 ENCOUNTER — APPOINTMENT (OUTPATIENT)
Dept: RADIOLOGY | Facility: HOSPITAL | Age: 59
End: 2024-01-15
Attending: UROLOGY
Payer: COMMERCIAL

## 2024-01-15 ENCOUNTER — ANESTHESIA EVENT (OUTPATIENT)
Dept: SURGERY | Facility: HOSPITAL | Age: 59
End: 2024-01-15
Payer: COMMERCIAL

## 2024-01-15 ENCOUNTER — HOSPITAL ENCOUNTER (OUTPATIENT)
Facility: HOSPITAL | Age: 59
Discharge: HOME | End: 2024-01-15
Attending: UROLOGY | Admitting: UROLOGY
Payer: COMMERCIAL

## 2024-01-15 VITALS
RESPIRATION RATE: 14 BRPM | SYSTOLIC BLOOD PRESSURE: 142 MMHG | WEIGHT: 270.9 LBS | TEMPERATURE: 97.6 F | BODY MASS INDEX: 38.78 KG/M2 | HEART RATE: 72 BPM | DIASTOLIC BLOOD PRESSURE: 78 MMHG | OXYGEN SATURATION: 93 % | HEIGHT: 70 IN

## 2024-01-15 LAB
GLUCOSE BLDC GLUCOMTR-MCNC: 104 MG/DL (ref 70–99)
GLUCOSE BLDC GLUCOMTR-MCNC: 89 MG/DL (ref 70–99)

## 2024-01-15 PROCEDURE — C1894 INTRO/SHEATH, NON-LASER: HCPCS | Performed by: UROLOGY

## 2024-01-15 PROCEDURE — 82365 CALCULUS SPECTROSCOPY: CPT | Performed by: UROLOGY

## 2024-01-15 PROCEDURE — C1769 GUIDE WIRE: HCPCS | Performed by: UROLOGY

## 2024-01-15 PROCEDURE — 250N000009 HC RX 250: Performed by: ANESTHESIOLOGY

## 2024-01-15 PROCEDURE — 250N000011 HC RX IP 250 OP 636: Performed by: NURSE PRACTITIONER

## 2024-01-15 PROCEDURE — C2617 STENT, NON-COR, TEM W/O DEL: HCPCS | Performed by: UROLOGY

## 2024-01-15 PROCEDURE — 710N000012 HC RECOVERY PHASE 2, PER MINUTE: Performed by: UROLOGY

## 2024-01-15 PROCEDURE — 250N000011 HC RX IP 250 OP 636: Performed by: ANESTHESIOLOGY

## 2024-01-15 PROCEDURE — 258N000003 HC RX IP 258 OP 636: Performed by: ANESTHESIOLOGY

## 2024-01-15 PROCEDURE — 255N000002 HC RX 255 OP 636: Performed by: UROLOGY

## 2024-01-15 PROCEDURE — 999N000180 XR SURGERY CARM FLUORO LESS THAN 5 MIN

## 2024-01-15 PROCEDURE — 360N000084 HC SURGERY LEVEL 4 W/ FLUORO, PER MIN: Performed by: UROLOGY

## 2024-01-15 PROCEDURE — 250N000025 HC SEVOFLURANE, PER MIN: Performed by: UROLOGY

## 2024-01-15 PROCEDURE — 999N000141 HC STATISTIC PRE-PROCEDURE NURSING ASSESSMENT: Performed by: UROLOGY

## 2024-01-15 PROCEDURE — 370N000017 HC ANESTHESIA TECHNICAL FEE, PER MIN: Performed by: UROLOGY

## 2024-01-15 PROCEDURE — 82962 GLUCOSE BLOOD TEST: CPT

## 2024-01-15 PROCEDURE — 250N000013 HC RX MED GY IP 250 OP 250 PS 637: Performed by: ANESTHESIOLOGY

## 2024-01-15 PROCEDURE — 710N000009 HC RECOVERY PHASE 1, LEVEL 1, PER MIN: Performed by: UROLOGY

## 2024-01-15 PROCEDURE — 272N000001 HC OR GENERAL SUPPLY STERILE: Performed by: UROLOGY

## 2024-01-15 DEVICE — URETERAL STENT
Type: IMPLANTABLE DEVICE | Site: URETER | Status: FUNCTIONAL
Brand: PERCUFLEX™ PLUS

## 2024-01-15 RX ORDER — LIDOCAINE HYDROCHLORIDE 10 MG/ML
INJECTION, SOLUTION INFILTRATION; PERINEURAL PRN
Status: DISCONTINUED | OUTPATIENT
Start: 2024-01-15 | End: 2024-01-15

## 2024-01-15 RX ORDER — MEPERIDINE HYDROCHLORIDE 25 MG/ML
12.5 INJECTION INTRAMUSCULAR; INTRAVENOUS; SUBCUTANEOUS EVERY 5 MIN PRN
Status: DISCONTINUED | OUTPATIENT
Start: 2024-01-15 | End: 2024-01-15 | Stop reason: HOSPADM

## 2024-01-15 RX ORDER — FENTANYL CITRATE 50 UG/ML
INJECTION, SOLUTION INTRAMUSCULAR; INTRAVENOUS PRN
Status: DISCONTINUED | OUTPATIENT
Start: 2024-01-15 | End: 2024-01-15

## 2024-01-15 RX ORDER — FENTANYL CITRATE 50 UG/ML
25 INJECTION, SOLUTION INTRAMUSCULAR; INTRAVENOUS EVERY 5 MIN PRN
Status: DISCONTINUED | OUTPATIENT
Start: 2024-01-15 | End: 2024-01-15 | Stop reason: HOSPADM

## 2024-01-15 RX ORDER — ONDANSETRON 4 MG/1
4 TABLET, ORALLY DISINTEGRATING ORAL EVERY 30 MIN PRN
Status: DISCONTINUED | OUTPATIENT
Start: 2024-01-15 | End: 2024-01-15 | Stop reason: HOSPADM

## 2024-01-15 RX ORDER — MAGNESIUM SULFATE 4 G/50ML
4 INJECTION INTRAVENOUS ONCE
Status: COMPLETED | OUTPATIENT
Start: 2024-01-15 | End: 2024-01-15

## 2024-01-15 RX ORDER — OXYCODONE HYDROCHLORIDE 5 MG/1
5-10 TABLET ORAL EVERY 4 HOURS PRN
Status: DISCONTINUED | OUTPATIENT
Start: 2024-01-15 | End: 2024-01-15 | Stop reason: HOSPADM

## 2024-01-15 RX ORDER — DEXAMETHASONE SODIUM PHOSPHATE 10 MG/ML
INJECTION, SOLUTION INTRAMUSCULAR; INTRAVENOUS PRN
Status: DISCONTINUED | OUTPATIENT
Start: 2024-01-15 | End: 2024-01-15

## 2024-01-15 RX ORDER — SODIUM CHLORIDE, SODIUM LACTATE, POTASSIUM CHLORIDE, CALCIUM CHLORIDE 600; 310; 30; 20 MG/100ML; MG/100ML; MG/100ML; MG/100ML
INJECTION, SOLUTION INTRAVENOUS CONTINUOUS
Status: DISCONTINUED | OUTPATIENT
Start: 2024-01-15 | End: 2024-01-15 | Stop reason: HOSPADM

## 2024-01-15 RX ORDER — ACETAMINOPHEN 325 MG/1
975 TABLET ORAL
Status: DISCONTINUED | OUTPATIENT
Start: 2024-01-15 | End: 2024-01-15 | Stop reason: HOSPADM

## 2024-01-15 RX ORDER — HALOPERIDOL 5 MG/ML
1 INJECTION INTRAMUSCULAR
Status: DISCONTINUED | OUTPATIENT
Start: 2024-01-15 | End: 2024-01-15 | Stop reason: HOSPADM

## 2024-01-15 RX ORDER — ONDANSETRON 2 MG/ML
4 INJECTION INTRAMUSCULAR; INTRAVENOUS EVERY 30 MIN PRN
Status: DISCONTINUED | OUTPATIENT
Start: 2024-01-15 | End: 2024-01-15 | Stop reason: HOSPADM

## 2024-01-15 RX ORDER — NALOXONE HYDROCHLORIDE 0.4 MG/ML
0.4 INJECTION, SOLUTION INTRAMUSCULAR; INTRAVENOUS; SUBCUTANEOUS
Status: DISCONTINUED | OUTPATIENT
Start: 2024-01-15 | End: 2024-01-15 | Stop reason: HOSPADM

## 2024-01-15 RX ORDER — PROPOFOL 10 MG/ML
INJECTION, EMULSION INTRAVENOUS PRN
Status: DISCONTINUED | OUTPATIENT
Start: 2024-01-15 | End: 2024-01-15

## 2024-01-15 RX ORDER — ONDANSETRON 2 MG/ML
INJECTION INTRAMUSCULAR; INTRAVENOUS PRN
Status: DISCONTINUED | OUTPATIENT
Start: 2024-01-15 | End: 2024-01-15

## 2024-01-15 RX ORDER — ACETAMINOPHEN 325 MG/1
975 TABLET ORAL ONCE
Status: COMPLETED | OUTPATIENT
Start: 2024-01-15 | End: 2024-01-15

## 2024-01-15 RX ORDER — OXYCODONE HYDROCHLORIDE 5 MG/1
5 TABLET ORAL EVERY 4 HOURS PRN
Status: DISCONTINUED | OUTPATIENT
Start: 2024-01-15 | End: 2024-01-15 | Stop reason: HOSPADM

## 2024-01-15 RX ORDER — KETOROLAC TROMETHAMINE 30 MG/ML
15 INJECTION, SOLUTION INTRAMUSCULAR; INTRAVENOUS
Status: DISCONTINUED | OUTPATIENT
Start: 2024-01-15 | End: 2024-01-15 | Stop reason: HOSPADM

## 2024-01-15 RX ORDER — HYDROMORPHONE HYDROCHLORIDE 1 MG/ML
0.2 INJECTION, SOLUTION INTRAMUSCULAR; INTRAVENOUS; SUBCUTANEOUS EVERY 5 MIN PRN
Status: DISCONTINUED | OUTPATIENT
Start: 2024-01-15 | End: 2024-01-15 | Stop reason: HOSPADM

## 2024-01-15 RX ORDER — IBUPROFEN 200 MG
800 TABLET ORAL EVERY 4 HOURS PRN
COMMUNITY
End: 2024-04-05

## 2024-01-15 RX ORDER — CEFAZOLIN SODIUM/WATER 3 G/30 ML
3 SYRINGE (ML) INTRAVENOUS SEE ADMIN INSTRUCTIONS
Status: DISCONTINUED | OUTPATIENT
Start: 2024-01-15 | End: 2024-01-15 | Stop reason: HOSPADM

## 2024-01-15 RX ORDER — LIDOCAINE 40 MG/G
CREAM TOPICAL
Status: DISCONTINUED | OUTPATIENT
Start: 2024-01-15 | End: 2024-01-15 | Stop reason: HOSPADM

## 2024-01-15 RX ORDER — FENTANYL CITRATE 50 UG/ML
50 INJECTION, SOLUTION INTRAMUSCULAR; INTRAVENOUS EVERY 5 MIN PRN
Status: DISCONTINUED | OUTPATIENT
Start: 2024-01-15 | End: 2024-01-15 | Stop reason: HOSPADM

## 2024-01-15 RX ORDER — ACETAMINOPHEN 325 MG/1
650 TABLET ORAL EVERY 4 HOURS PRN
Status: DISCONTINUED | OUTPATIENT
Start: 2024-01-15 | End: 2024-01-15 | Stop reason: HOSPADM

## 2024-01-15 RX ORDER — FENTANYL CITRATE 50 UG/ML
25 INJECTION, SOLUTION INTRAMUSCULAR; INTRAVENOUS
Status: DISCONTINUED | OUTPATIENT
Start: 2024-01-15 | End: 2024-01-15 | Stop reason: HOSPADM

## 2024-01-15 RX ORDER — LORAZEPAM 2 MG/ML
.5-1 INJECTION INTRAMUSCULAR
Status: DISCONTINUED | OUTPATIENT
Start: 2024-01-15 | End: 2024-01-15 | Stop reason: HOSPADM

## 2024-01-15 RX ORDER — HYDROMORPHONE HYDROCHLORIDE 1 MG/ML
0.4 INJECTION, SOLUTION INTRAMUSCULAR; INTRAVENOUS; SUBCUTANEOUS EVERY 5 MIN PRN
Status: DISCONTINUED | OUTPATIENT
Start: 2024-01-15 | End: 2024-01-15 | Stop reason: HOSPADM

## 2024-01-15 RX ORDER — CEFAZOLIN SODIUM/WATER 3 G/30 ML
3 SYRINGE (ML) INTRAVENOUS
Status: DISCONTINUED | OUTPATIENT
Start: 2024-01-15 | End: 2024-01-15 | Stop reason: HOSPADM

## 2024-01-15 RX ORDER — NALOXONE HYDROCHLORIDE 0.4 MG/ML
0.2 INJECTION, SOLUTION INTRAMUSCULAR; INTRAVENOUS; SUBCUTANEOUS
Status: DISCONTINUED | OUTPATIENT
Start: 2024-01-15 | End: 2024-01-15 | Stop reason: HOSPADM

## 2024-01-15 RX ORDER — OXYCODONE HYDROCHLORIDE 5 MG/1
10 TABLET ORAL EVERY 4 HOURS PRN
Status: DISCONTINUED | OUTPATIENT
Start: 2024-01-15 | End: 2024-01-15 | Stop reason: HOSPADM

## 2024-01-15 RX ADMIN — SUGAMMADEX 200 MG: 100 INJECTION, SOLUTION INTRAVENOUS at 11:10

## 2024-01-15 RX ADMIN — PHENYLEPHRINE HYDROCHLORIDE 200 MCG: 10 INJECTION INTRAVENOUS at 10:56

## 2024-01-15 RX ADMIN — Medication 3 G: at 10:20

## 2024-01-15 RX ADMIN — FENTANYL CITRATE 100 MCG: 50 INJECTION INTRAMUSCULAR; INTRAVENOUS at 10:23

## 2024-01-15 RX ADMIN — SODIUM CHLORIDE, POTASSIUM CHLORIDE, SODIUM LACTATE AND CALCIUM CHLORIDE: 600; 310; 30; 20 INJECTION, SOLUTION INTRAVENOUS at 11:41

## 2024-01-15 RX ADMIN — FENTANYL CITRATE 100 MCG: 50 INJECTION INTRAMUSCULAR; INTRAVENOUS at 10:50

## 2024-01-15 RX ADMIN — SUGAMMADEX 100 MG: 100 INJECTION, SOLUTION INTRAVENOUS at 11:14

## 2024-01-15 RX ADMIN — PROPOFOL 150 MG: 10 INJECTION, EMULSION INTRAVENOUS at 10:23

## 2024-01-15 RX ADMIN — ROCURONIUM BROMIDE 10 MG: 50 INJECTION, SOLUTION INTRAVENOUS at 11:04

## 2024-01-15 RX ADMIN — MAGNESIUM SULFATE HEPTAHYDRATE 4 G: 80 INJECTION, SOLUTION INTRAVENOUS at 09:30

## 2024-01-15 RX ADMIN — PHENYLEPHRINE HYDROCHLORIDE 100 MCG: 10 INJECTION INTRAVENOUS at 11:09

## 2024-01-15 RX ADMIN — DEXAMETHASONE SODIUM PHOSPHATE 4 MG: 10 INJECTION, SOLUTION INTRAMUSCULAR; INTRAVENOUS at 10:27

## 2024-01-15 RX ADMIN — PHENYLEPHRINE HYDROCHLORIDE 100 MCG: 10 INJECTION INTRAVENOUS at 11:06

## 2024-01-15 RX ADMIN — ROCURONIUM BROMIDE 50 MG: 50 INJECTION, SOLUTION INTRAVENOUS at 10:24

## 2024-01-15 RX ADMIN — ONDANSETRON 4 MG: 2 INJECTION INTRAMUSCULAR; INTRAVENOUS at 11:08

## 2024-01-15 RX ADMIN — LIDOCAINE HYDROCHLORIDE 5 ML: 10 INJECTION, SOLUTION INFILTRATION; PERINEURAL at 10:23

## 2024-01-15 RX ADMIN — PROPOFOL 50 MG: 10 INJECTION, EMULSION INTRAVENOUS at 10:50

## 2024-01-15 RX ADMIN — SODIUM CHLORIDE, POTASSIUM CHLORIDE, SODIUM LACTATE AND CALCIUM CHLORIDE: 600; 310; 30; 20 INJECTION, SOLUTION INTRAVENOUS at 11:26

## 2024-01-15 RX ADMIN — SODIUM CHLORIDE, POTASSIUM CHLORIDE, SODIUM LACTATE AND CALCIUM CHLORIDE: 600; 310; 30; 20 INJECTION, SOLUTION INTRAVENOUS at 09:30

## 2024-01-15 RX ADMIN — ACETAMINOPHEN 975 MG: 325 TABLET ORAL at 09:20

## 2024-01-15 RX ADMIN — MAGNESIUM SULFATE HEPTAHYDRATE 4 G: 80 INJECTION, SOLUTION INTRAVENOUS at 10:16

## 2024-01-15 ASSESSMENT — ACTIVITIES OF DAILY LIVING (ADL)
ADLS_ACUITY_SCORE: 35

## 2024-01-15 ASSESSMENT — LIFESTYLE VARIABLES: TOBACCO_USE: 0

## 2024-01-15 NOTE — ANESTHESIA PROCEDURE NOTES
Airway       Patient location during procedure: OR       Procedure Start/Stop Times: 1/15/2024 10:26 AM  Staff -        CRNA: Dilma Bradley APRN CRNA       Performed By: CRNA  Consent for Airway        Urgency: elective  Indications and Patient Condition       Indications for airway management: kedar-procedural         Mask difficulty assessment: 2 - vent by mask + OA or adjuvant +/- NMBA    Final Airway Details       Final airway type: endotracheal airway       Successful airway: ETT - single  Endotracheal Airway Details        ETT size (mm): 7.5       Cuffed: yes       Cuff volume (mL): 7       Successful intubation technique: direct laryngoscopy       Grade View of Cords: 3       Adjucts: stylet       Position: Center       Measured from: gums/teeth       Secured at (cm): 23       Bite block used: None    Post intubation assessment        Placement verified by: capnometry, equal breath sounds and chest rise        Number of attempts at approach: 1       Number of other approaches attempted: 0       Secured with: tape       Ease of procedure: easy       Dentition: Intact and Unchanged    Medication(s) Administered   Medication Administration Time: 1/15/2024 10:26 AM

## 2024-01-15 NOTE — OP NOTE
Location: [facility]      Patient Name: Juanito Delacruz  Patient : 1965  Patient MRN: [id number]  Patient CSN: [id number]  Patient PCP: Archie Mosher    Date of Service: 01/15/24     Pre-operative diagnosis: Right ureteral stone     Post-operative diagnosis: Right ureteral stone     Procedure:  Cystoscopy, Right retrograde pyelogram, Right ureteroscopy with laser lithotripsy, Right 6 Fr x 28 cm ureteral stent exchange     Surgeon: Dr. Reginaldo Pritchett     EBL: 5 mL     Anesthesia: General     Indication: 58 year old male who presented with on 23 with gross hematuria. Cystoscopy was negative and a CT urogram on 23 showed a 5 mm right lower pole renal stone. Patient contacted me on 23 with concerns that he was passing the right kidney stone.  A CT scan on 23 showed a 6 x 5 x 4 mm right UPJ stone. After discussion he wanted to try pass the stone and I recommended getting a CT scan in 3-4 weeks. Repeat imaging was done on 23 which showed that the right UPJ stone was now in the distal ureter. I attempted to treat the stone on 23 but the distal ureter was too tight to allow passage of the rigid and flexible ureteroscopes.  A right ureteral stent was placed and he presents today for repeat ureteroscopy.  Risks, benefits, and alternatives to treatment were discussed with the patient and the patient elected to proceed.     Findings: His urethra was normal. His prostate had bilobar hyperplasia with a mild elevated bladder neck. His bilateral ureteral orifices were normal.  No stones or tumors were in the bladder.  The right distal ureteral stone was seen on  imaging.  No right hydronephrosis was present. I was able to treat the stone in the ureter without difficulty. A new right ureteral stent was in good position.    Specimens: Right ureteral stone    Procedure:  After written informed consent was obtained the patient was brought into the operating room.  The patient was properly  identified prior to the procedure, received preprocedure antibiotics, and had sequential compression devices on the legs.  The patient was then induced under anesthesia.    The patient was placed in dorsal lithotomy position and prepped and draped in the usual sterile fashion.  His urethral meatus was calibrated to 24 Fr using a [place] sound. Cystoscopy was performed with the above findings.    The Right ureteral stent was grasped and brought to the urethral meatus.  A straight sensor wire was placed into the Right kidney through the stent and the stent was removed.     Right rigid ureteroscopy was performed but I could not reach the stone in this manner.  Via the rigid ureteroscope, a super stiff wire was passed into the right kidney.     Over the Super Stiff wire and under fluoroscopic guidance, a 11/13 Fr x 28 cm [employer] ureteral access sheath was placed. This was sutured in place using 2-0 Silk.  Flexible ureteroscopy was performed as above. The stone was broken into small fragments using the Holmium laser and 200 nm laser fiber. The [place] Halo basket was used to remove all stone fragments.  The access sheath was removed under direct vision.  The straight sensor wire was exchanged for the Super Stiff wire using a 5 Fr open-ended ureteral catheter (with opacification of the kidney before the Super Stiff wire was placed).  A 6 Fr x 28 cm ureteral stent was then deployed over the Super Stiff wire. There was a good coil in the kidney and a good coil in the bladder.  I verified the distal coil via cystoscopy. I emptied the bladder and removed the scope. At this point, the procedure was completed.  He tolerated the procedure well.    Plan: Home. Follow up on 1/26/24 for stent removal in the office.    Reginaldo Pritchett MD  11:27 AM

## 2024-01-15 NOTE — ANESTHESIA PREPROCEDURE EVALUATION
Anesthesia Pre-Procedure Evaluation    Patient: Juanito Delacruz   MRN: [id number] : 1965        Procedure : Procedure(s):  CYSTOSCOPY RIGHT RETROGRADE PYELOGRAM, RIGHT URETEROSCOPY WITH LASER LITHOTRIPSY, RIGHT URETERAL STENT EXCHANGE          Past Medical History:   Diagnosis Date    Cervicalgia     Non-specific Cervical Spine Pain    Diabetes mellitus, type 2 (H)     Diabetic ketoacidosis without coma associated with type 2 diabetes mellitus (H)     Fatty liver     History of colonic polyps     Kidney stone     Lumbago     Mechanical Low Back Pain    Morbid obesity (H)     Motion sickness     Muscular wasting and disuse atrophy, not elsewhere classified     Deconditioning Syndrome    Parotid nodule     Sleep apnea     cpap    Unspecified chronic suppurative otitis media     Ureteral stone       Past Surgical History:   Procedure Laterality Date    CYSTOURETEROSCOPY, WITH LITHOTRIPSY USING LETY 120P LASER AND URETERAL STENT INSERTION Right 2023    Procedure: CYSTOSCOPY, RIGHT RETROGRADE PYELOGRAM, RIGHT URETEROSCOPY WITH LASER STANDBY, RIGHT URETERAL STENT PLACEMENT;  Surgeon: Reginaldo Pritchett MD;  Location: [facility] OR    NO PAST SURGERIES      PAROTIDECTOMY Right 2023    Procedure: RIGHT SUPERFICIAL PAROTIDECTOMY;  Surgeon: Peggy Reese MD;  Location: [facility] OR      No Known Allergies   Social History     Tobacco Use    Smoking status: Never    Smokeless tobacco: Never   Substance Use Topics    Alcohol use: Yes     Comment: Alcoholic Drinks/day: rare      Wt Readings from Last 1 Encounters:   01/15/24 122.9 kg (270 lb 14.4 oz)        Anesthesia Evaluation        History of anesthetic complications  - motion sickness.      ROS/MED HX  ENT/Pulmonary:     (+) sleep apnea,                                    (-) tobacco use   Neurologic:       Cardiovascular:       METS/Exercise Tolerance:     Hematologic:       Musculoskeletal:   (+)  arthritis,             GI/Hepatic:     (+)  "            liver disease,       Renal/Genitourinary:     (+) renal disease,             Endo:     (+)  type II DM,             Obesity,       Psychiatric/Substance Use:       Infectious Disease:       Malignancy:  - neg malignancy ROS     Other:  - neg other ROS        Physical Exam    Airway        Mallampati: II   TM distance: > 3 FB   Neck ROM: full   Mouth opening: > 3 cm    Respiratory Devices and Support         Dental           Cardiovascular             Pulmonary                   OUTSIDE LABS:  CBC:   Lab Results   Component Value Date    WBC 10.7 01/02/2024    WBC 8.0 12/01/2023    HGB 15.1 01/02/2024    HGB 15.7 12/01/2023    HCT 44.9 01/02/2024    HCT 44.3 12/01/2023     01/02/2024     12/01/2023     BMP:   Lab Results   Component Value Date     01/02/2024     12/01/2023    POTASSIUM 4.6 01/02/2024    POTASSIUM 4.2 12/01/2023    CHLORIDE 103 01/02/2024    CHLORIDE 101 12/01/2023    CO2 25 01/02/2024    CO2 27 12/01/2023    BUN 14.4 01/02/2024    BUN 15.0 12/01/2023    CR 1.08 01/02/2024    CR 1.07 12/01/2023    GLC 80 01/02/2024    GLC 98 12/12/2023     COAGS: No results found for: \"PTT\", \"INR\", \"FIBR\"  POC: No results found for: \"BGM\", \"HCG\", \"HCGS\"  HEPATIC:   Lab Results   Component Value Date    ALBUMIN 4.4 01/02/2024    PROTTOTAL 7.0 01/02/2024    ALT 31 01/02/2024    AST 25 01/02/2024    ALKPHOS 49 01/02/2024    BILITOTAL 0.5 01/02/2024     OTHER:   Lab Results   Component Value Date    A1C 5.7 (H) 01/02/2024    MARYANNE 9.4 01/02/2024    TSH 1.02 09/28/2022    SED 7 03/28/2023       Anesthesia Plan    ASA Status:  3    NPO Status:  NPO Appropriate    Anesthesia Type: General.     - Airway: LMA   Induction: Intravenous.   Maintenance: Balanced.        Consents    Anesthesia Plan(s) and associated risks, benefits, and realistic alternatives discussed. Questions answered and patient/representative(s) expressed understanding.     - Discussed:     - Discussed with:  Patient      - " "Extended Intubation/Ventilatory Support Discussed: No.      - Patient is DNR/DNI Status: No     Use of blood products discussed: No .     Postoperative Care    Pain management: IV analgesics, Oral pain medications, Multi-modal analgesia.   PONV prophylaxis: Ondansetron (or other 5HT-3), Dexamethasone or Solumedrol     Comments:    Other Comments: Decadron, Zofran.  Diprivan infusion.  Toradol, Tylenol.  Ketamine (0.5 mg/kg).  Magnesium.         Archie Laura MD    I have reviewed the pertinent notes and labs in the chart from the past 30 days and (re)examined the patient.  Any updates or changes from those notes are reflected in this note.              # Obesity: Estimated body mass index is 38.87 kg/m  as calculated from the following:    Height as of this encounter: 1.778 m (5' 10\").    Weight as of this encounter: 122.9 kg (270 lb 14.4 oz).      "

## 2024-01-15 NOTE — ANESTHESIA POSTPROCEDURE EVALUATION
Patient: Juanito Delacruz    Procedure: Procedure(s):  CYSTOSCOPY RIGHT RETROGRADE PYELOGRAM, RIGHT URETEROSCOPY WITH LASER LITHOTRIPSY, RIGHT URETERAL STENT EXCHANGE       Anesthesia Type:  General    Note:  Disposition: Outpatient   Postop Pain Control: Uneventful            Sign Out: Well controlled pain   PONV: No   Neuro/Psych: Uneventful            Sign Out: Acceptable/Baseline neuro status   Airway/Respiratory: Uneventful            Sign Out: Acceptable/Baseline resp. status   CV/Hemodynamics: Uneventful            Sign Out: Acceptable CV status   Other NRE:    DID A NON-ROUTINE EVENT OCCUR?            Last vitals:  Vitals Value Taken Time   /73 01/15/24 1215   Temp 36.4  C (97.5  F) 01/15/24 1200   Pulse 68 01/15/24 1219   Resp 10 01/15/24 1219   SpO2 95 % 01/15/24 1219   Vitals shown include unfiled device data.    Electronically Signed By: Raymundo Kwan MD  January 15, 2024  3:58 PM

## 2024-01-15 NOTE — INTERVAL H&P NOTE
"I have reviewed the surgical (or preoperative) H&P that is linked to this encounter, and examined the patient. There are no significant changes    Clinical Conditions Present on Arrival:  Clinically Significant Risk Factors Present on Admission                  # Obesity: Estimated body mass index is 38.87 kg/m  as calculated from the following:    Height as of this encounter: 1.778 m (5' 10\").    Weight as of this encounter: 122.9 kg (270 lb 14.4 oz).       "

## 2024-01-15 NOTE — ANESTHESIA CARE TRANSFER NOTE
Patient: Juanito Delacruz    Procedure: Procedure(s):  CYSTOSCOPY RIGHT RETROGRADE PYELOGRAM, RIGHT URETEROSCOPY WITH LASER LITHOTRIPSY, RIGHT URETERAL STENT EXCHANGE       Diagnosis: Calculus of ureter [N20.1]  Diagnosis Additional Information: No value filed.    Anesthesia Type:   General     Note:    Oropharynx: oropharynx clear of all foreign objects  Level of Consciousness: awake  Oxygen Supplementation: face mask (Oxymask)  Level of Supplemental Oxygen (L/min / FiO2): 10  Independent Airway: airway patency satisfactory and stable  Dentition: dentition unchanged  Vital Signs Stable: post-procedure vital signs reviewed and stable  Report to RN Given: handoff report given    Comments: Pt awake, alert.        Vitals:  Vitals Value Taken Time   /70 01/15/24 1130   Temp     Pulse 81 01/15/24 1130   Resp 13 01/15/24 1130   SpO2 96 % 01/15/24 1130   Vitals shown include unfiled device data.    Electronically Signed By: CICI Ruvalcaba CRNA  January 15, 2024  11:32 AM

## 2024-01-19 LAB
APPEARANCE STONE: NORMAL
COMPN STONE: NORMAL
SPECIMEN WT: 81 MG

## 2024-01-26 ENCOUNTER — TRANSFERRED RECORDS (OUTPATIENT)
Dept: HEALTH INFORMATION MANAGEMENT | Facility: CLINIC | Age: 59
End: 2024-01-26
Payer: COMMERCIAL

## 2024-01-30 ENCOUNTER — PATIENT OUTREACH (OUTPATIENT)
Dept: GASTROENTEROLOGY | Facility: CLINIC | Age: 59
End: 2024-01-30
Payer: COMMERCIAL

## 2024-01-30 ENCOUNTER — LAB (OUTPATIENT)
Dept: LAB | Facility: HOSPITAL | Age: 59
End: 2024-01-30
Payer: COMMERCIAL

## 2024-01-30 DIAGNOSIS — N20.0 CALCULUS OF KIDNEY: Primary | ICD-10-CM

## 2024-01-30 LAB
ALBUMIN UR-MCNC: 20 MG/DL
APPEARANCE UR: ABNORMAL
BACTERIA #/AREA URNS HPF: ABNORMAL /HPF
BILIRUB UR QL STRIP: NEGATIVE
COLOR UR AUTO: YELLOW
GLUCOSE UR STRIP-MCNC: NEGATIVE MG/DL
HGB UR QL STRIP: ABNORMAL
KETONES UR STRIP-MCNC: NEGATIVE MG/DL
LEUKOCYTE ESTERASE UR QL STRIP: NEGATIVE
NITRATE UR QL: POSITIVE
PH UR STRIP: 6 [PH] (ref 5–7)
RBC URINE: 2 /HPF
SP GR UR STRIP: 1.02 (ref 1–1.03)
UROBILINOGEN UR STRIP-MCNC: <2 MG/DL
WBC URINE: 1 /HPF

## 2024-01-30 PROCEDURE — 81001 URINALYSIS AUTO W/SCOPE: CPT

## 2024-01-30 PROCEDURE — 87186 SC STD MICRODIL/AGAR DIL: CPT

## 2024-01-30 PROCEDURE — 87086 URINE CULTURE/COLONY COUNT: CPT

## 2024-02-01 LAB — BACTERIA UR CULT: ABNORMAL

## 2024-02-12 ENCOUNTER — TELEPHONE (OUTPATIENT)
Dept: ENDOCRINOLOGY | Facility: CLINIC | Age: 59
End: 2024-02-12
Payer: COMMERCIAL

## 2024-02-12 DIAGNOSIS — E11.65 TYPE 2 DIABETES MELLITUS WITH HYPERGLYCEMIA, WITHOUT LONG-TERM CURRENT USE OF INSULIN (H): ICD-10-CM

## 2024-02-12 NOTE — TELEPHONE ENCOUNTER
Please schedule patient for first available follow up.    Requested Prescriptions   Pending Prescriptions Disp Refills    Semaglutide (1 MG/DOSE) (OZEMPIC) 4 MG/3ML pen 3 mL 3     Sig: Inject 1 mg Subcutaneous every 7 days       GLP-1 Agonists Protocol Passed - 2/12/2024  9:40 AM        Passed - HgbA1C in past 3 or 6 months     If HgbA1C is 8 or greater, it needs to be on file within the past 3 months.  If less than 8, must be on file within the past 6 months.     Recent Labs   Lab Test 01/02/24  1505   A1C 5.7*             Passed - Medication is active on med list        Passed - Has GFR on file in past 12 months and most recent value is normal        Passed - Recent (6 mo) or future (90 days) visit within the authorizing provider's specialty     The patient must have completed an in-person or virtual visit within the past 6 months or has a future visit scheduled within the next 90 days with the authorizing provider s specialty.  Urgent care and e-visits do not quality as an office visit for this protocol.          Passed - Medication indicated for associated diagnosis     Medication is associated with one or more of the following diagnoses:     Type 2 diabetes mellitus           Passed - Patient is age 18 or older

## 2024-02-19 DIAGNOSIS — I10 BENIGN ESSENTIAL HYPERTENSION: ICD-10-CM

## 2024-02-19 RX ORDER — LOSARTAN POTASSIUM 25 MG/1
25 TABLET ORAL DAILY
Qty: 90 TABLET | Refills: 1 | Status: SHIPPED | OUTPATIENT
Start: 2024-02-19

## 2024-02-19 NOTE — TELEPHONE ENCOUNTER
Pt would like appt at 3pm, says that Abiola see's him around that time. Okay to use 3pm SIMI spot for follow up?

## 2024-02-19 NOTE — TELEPHONE ENCOUNTER
Requested Prescriptions   Pending Prescriptions Disp Refills    losartan (COZAAR) 25 MG tablet 90 tablet 0     Sig: Take 1 tablet (25 mg) by mouth daily       Angiotensin-II Receptors Failed - 2/19/2024  1:58 PM        Failed - Last blood pressure under 140/90 in past 12 months     BP Readings from Last 3 Encounters:   01/15/24 (!) 142/78   01/02/24 120/74   12/26/23 (!) 160/90                 Passed - Medication is active on med list        Passed - Has GFR on file in past 12 months and most recent value is normal        Passed - Medication indicated for associated diagnosis     The medication is prescribed for one or more of the following conditions:    Chronic Kidney Disease (CDK)    Heart Failure (HF)    Diabetes, Nephropathy   Hypertension    Coronary Artery Disease (CAD)            Passed - Recent (12 mo) or future (90days) visit within the authorizing provider's specialty     The patient must have completed an in-person or virtual visit within the past 12 months or has a future visit scheduled within the next 90 days with the authorizing provider s specialty.  Urgent care and e-visits do not quality as an office visit for this protocol.          Passed - Patient is age 18 or older        Passed - Normal serum potassium on file in past 12 months     Recent Labs   Lab Test 01/02/24  1505   POTASSIUM 4.6

## 2024-03-13 ENCOUNTER — OFFICE VISIT (OUTPATIENT)
Dept: DERMATOLOGY | Facility: CLINIC | Age: 59
End: 2024-03-13
Payer: COMMERCIAL

## 2024-03-13 DIAGNOSIS — D22.9 MULTIPLE BENIGN NEVI: ICD-10-CM

## 2024-03-13 DIAGNOSIS — D23.9 DERMAL NEVUS: Primary | ICD-10-CM

## 2024-03-13 DIAGNOSIS — L81.4 LENTIGO: ICD-10-CM

## 2024-03-13 DIAGNOSIS — D18.01 ANGIOMA OF SKIN: ICD-10-CM

## 2024-03-13 DIAGNOSIS — L82.1 SEBORRHEIC KERATOSES: ICD-10-CM

## 2024-03-13 PROCEDURE — 99203 OFFICE O/P NEW LOW 30 MIN: CPT | Performed by: DERMATOLOGY

## 2024-03-13 ASSESSMENT — PAIN SCALES - GENERAL: PAINLEVEL: NO PAIN (0)

## 2024-03-13 NOTE — PROGRESS NOTES
Juanito Delacruz is an extremely pleasant 58 year old year old male patient here today for spot son skin.  Patient has no other skin complaints today.  Remainder of the HPI, Meds, PMH, Allergies, FH, and SH was reviewed in chart.      Past Medical History:   Diagnosis Date    Cervicalgia     Non-specific Cervical Spine Pain    Diabetes mellitus, type 2 (H)     Diabetic ketoacidosis without coma associated with type 2 diabetes mellitus (H)     Fatty liver     History of colonic polyps     Kidney stone     Lumbago     Mechanical Low Back Pain    Morbid obesity (H)     Motion sickness     Muscular wasting and disuse atrophy, not elsewhere classified     Deconditioning Syndrome    Parotid nodule     Sleep apnea     cpap    Unspecified chronic suppurative otitis media     Ureteral stone        Past Surgical History:   Procedure Laterality Date    CYSTOURETEROSCOPY, WITH LITHOTRIPSY USING LETY 120P LASER AND URETERAL STENT INSERTION Right 12/12/2023    Procedure: CYSTOSCOPY, RIGHT RETROGRADE PYELOGRAM, RIGHT URETEROSCOPY WITH LASER STANDBY, RIGHT URETERAL STENT PLACEMENT;  Surgeon: Reginaldo Pritchett MD;  Location: [facility] OR    CYSTOURETEROSCOPY, WITH LITHOTRIPSY USING LETY 120P LASER AND URETERAL STENT INSERTION Right 1/15/2024    Procedure: CYSTOSCOPY RIGHT RETROGRADE PYELOGRAM, RIGHT URETEROSCOPY WITH LASER LITHOTRIPSY, RIGHT URETERAL STENT EXCHANGE;  Surgeon: Reginaldo Pritchett MD;  Location: [facility] OR    NO PAST SURGERIES      PAROTIDECTOMY Right 12/26/2023    Procedure: RIGHT SUPERFICIAL PAROTIDECTOMY;  Surgeon: Peggy Reese MD;  Location: [facility] OR        Family History   Problem Relation Age of Onset    Pancreatic Cancer Mother         islet cell    Diabetes Father     Neuropathy Father     No Known Problems Sister     Obesity Brother     No Known Problems Brother        Social History     Socioeconomic History    Marital status:      Spouse name: Not on file    Number of  children: Not on file    Years of education: Not on file    Highest education level: Not on file   Occupational History    Not on file   Tobacco Use    Smoking status: Never    Smokeless tobacco: Never   Vaping Use    Vaping Use: Never used   Substance and Sexual Activity    Alcohol use: Yes     Comment: Alcoholic Drinks/day: rare    Drug use: Never    Sexual activity: Yes     Partners: Female     Birth control/protection: Pill   Other Topics Concern    Not on file   Social History Narrative    Lives with his wife, Dara.  Son Ted (2002) and Sophie (2004).  Works as [employer] at [employer].  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       Social Determinants of Health     Financial Resource Strain: Low Risk  (1/2/2024)    Financial Resource Strain     Within the past 12 months, have you or your family members you live with been unable to get utilities (heat, electricity) when it was really needed?: No   Food Insecurity: Low Risk  (1/2/2024)    Food Insecurity     Within the past 12 months, did you worry that your food would run out before you got money to buy more?: No     Within the past 12 months, did the food you bought just not last and you didn t have money to get more?: No   Transportation Needs: Low Risk  (1/2/2024)    Transportation Needs     Within the past 12 months, has lack of transportation kept you from medical appointments, getting your medicines, non-medical meetings or appointments, work, or from getting things that you need?: No   Physical Activity: Insufficiently Active (1/2/2024)    Exercise Vital Sign     Days of Exercise per Week: 3 days     Minutes of Exercise per Session: 30 min   Stress: No Stress Concern Present (1/2/2024)    [employer] [place] [employer] - Occupational Stress Questionnaire     Feeling of Stress : Not at all   Social Connections: Socially Integrated (1/2/2024)    Social Connection and Isolation Panel [NHANES]     Frequency of Communication with  Friends and Family: More than three times a week     Frequency of Social Gatherings with Friends and Family: Once a week     Attends Sikh Services: More than 4 times per year     Active Member of Clubs or Organizations: Yes     Attends Club or Organization Meetings: More than 4 times per year     Marital Status:    Interpersonal Safety: Low Risk  (12/1/2023)    Interpersonal Safety     Do you feel physically and emotionally safe where you currently live?: Yes     Within the past 12 months, have you been hit, slapped, kicked or otherwise physically hurt by someone?: No     Within the past 12 months, have you been humiliated or emotionally abused in other ways by your partner or ex-partner?: No   Housing Stability: High Risk (1/2/2024)    Housing Stability     Do you have housing? : Yes     Are you worried about losing your housing?: Yes       Outpatient Encounter Medications as of 3/13/2024   Medication Sig Dispense Refill    acetaminophen (TYLENOL) 500 MG tablet Take 2 tablets (1,000 mg) by mouth every 6 hours as needed for mild pain      albuterol (PROAIR HFA/PROVENTIL HFA/VENTOLIN HFA) 108 (90 Base) MCG/ACT inhaler Inhale 2 puffs into the lungs every 6 hours as needed for shortness of breath, wheezing or cough 18 g 0    Continuous Blood Gluc Sensor (FREESTYLE LUKE 2 SENSOR) MISC Change every 14 days. 6 each 1    ibuprofen (ADVIL/MOTRIN) 200 MG tablet Take 800 mg by mouth every 4 hours as needed for pain      losartan (COZAAR) 25 MG tablet Take 1 tablet (25 mg) by mouth daily 90 tablet 1    multivitamin w/minerals (MULTIVITAMIN, THERAPEUTIC WITH MINERALS) tablet Take 1 tablet by mouth daily (Patient not taking: Reported on 1/2/2024)      ondansetron (ZOFRAN) 8 MG tablet Take 8 mg by mouth every 8 hours as needed      oxyBUTYnin (DITROPAN) 5 MG tablet Take 1 tablet (5 mg) by mouth 3 times daily as needed for bladder spasms 30 tablet 1    oxyCODONE (ROXICODONE) 5 MG tablet Take 5 mg by mouth every 6  hours as needed for pain      Semaglutide, 1 MG/DOSE, (OZEMPIC) 4 MG/3ML pen Inject 1 mg Subcutaneous every 7 days 3 mL 0    STATIN NOT PRESCRIBED (INTENTIONAL) Please choose reason not prescribed from choices below.      tamsulosin (FLOMAX) 0.4 MG capsule Take 0.4 mg by mouth daily      testosterone (ANDROGEL 1.62 % PUMP) 20.25 MG/ACT gel Place 2 Pump (40.5 mg) onto the skin daily Apply from dispenser to clean, dry, intact skin of the upper arms and shoulders. 75 g 1     No facility-administered encounter medications on file as of 3/13/2024.             O:   NAD, WDWN, Alert & Oriented, Mood & Affect wnl, Vitals stable   General appearance normal   Vitals stable   Alert, oriented and in no acute distress      Following lymph nodes palpated: Occipital, Cervical, Supraclavicular no lad  pigmented macules on trunk and ext with regular borders and pigment networks        Stuck on papules and brown macules on trunk and ext   Red papules on trunk  Flesh colored papules on trunk     The remainder of the full exam was normal; the following areas were examined:  conjunctiva/lids, , neck, peripheral vascular system, abdomen, lymph nodes, digits/nails, eccrine and apocrine glands, scalp/hair, face, neck, chest, abdomen, buttocks, back, RUE, LUE, RLE, LLE       Eyes: Conjunctivae/lids:Normal     ENT: Lips, buccal mucosa, tongue: normal    MSK:Normal    Cardiovascular: peripheral edema none    Pulm: Breathing Normal    Lymph Nodes: No Head and Neck Lymphadenopathy     Neuro/Psych: Orientation:Alert and Orientedx3 ; Mood/Affect:normal       A/P:  1. Seborrheic keratosis, lentigo, angioma, dermal nevus, nevi   It was a pleasure speaking to Juanito Delacruz today.  Previous clinic notes and pertinent laboratory tests were reviewed prior to Juanito Delacruz's visit.  Nature and genetics of benign skin lesions dicussed with patient.  Signs and Symptoms of skin cancer discussed with patient.  Patient encouraged to perform monthly skin  exams.  UV precautions reviewed with patient.  Risks of non-melanoma skin cancer discussed with patient   Return to clinic 12 months

## 2024-03-13 NOTE — LETTER
3/13/2024         RE: Juanito Delacruz  [id number]  [facility] [address]        Dear Colleague,    Thank you for referring your patient, Juanito Delacruz, to the [facility]. Please see a copy of my visit note below.    Juanito Delacruz is an extremely pleasant 58 year old year old male patient here today for spot son skin.  Patient has no other skin complaints today.  Remainder of the HPI, Meds, PMH, Allergies, FH, and SH was reviewed in chart.      Past Medical History:   Diagnosis Date     Cervicalgia     Non-specific Cervical Spine Pain     Diabetes mellitus, type 2 (H)      Diabetic ketoacidosis without coma associated with type 2 diabetes mellitus (H)      Fatty liver      History of colonic polyps      Kidney stone      Lumbago     Mechanical Low Back Pain     Morbid obesity (H)      Motion sickness      Muscular wasting and disuse atrophy, not elsewhere classified     Deconditioning Syndrome     Parotid nodule      Sleep apnea     cpap     Unspecified chronic suppurative otitis media      Ureteral stone        Past Surgical History:   Procedure Laterality Date     CYSTOURETEROSCOPY, WITH LITHOTRIPSY USING LETY 120P LASER AND URETERAL STENT INSERTION Right 12/12/2023    Procedure: CYSTOSCOPY, RIGHT RETROGRADE PYELOGRAM, RIGHT URETEROSCOPY WITH LASER STANDBY, RIGHT URETERAL STENT PLACEMENT;  Surgeon: Reginaldo Pritchett MD;  Location: [facility] OR     CYSTOURETEROSCOPY, WITH LITHOTRIPSY USING LETY 120P LASER AND URETERAL STENT INSERTION Right 1/15/2024    Procedure: CYSTOSCOPY RIGHT RETROGRADE PYELOGRAM, RIGHT URETEROSCOPY WITH LASER LITHOTRIPSY, RIGHT URETERAL STENT EXCHANGE;  Surgeon: Reginaldo Pritchett MD;  Location: [facility] OR     NO PAST SURGERIES       PAROTIDECTOMY Right 12/26/2023    Procedure: RIGHT SUPERFICIAL PAROTIDECTOMY;  Surgeon: Peggy Reese MD;  Location: [facility] OR        Family History   Problem Relation Age of Onset     Pancreatic Cancer  Mother         islet cell     Diabetes Father      Neuropathy Father      No Known Problems Sister      Obesity Brother      No Known Problems Brother        Social History     Socioeconomic History     Marital status:      Spouse name: Not on file     Number of children: Not on file     Years of education: Not on file     Highest education level: Not on file   Occupational History     Not on file   Tobacco Use     Smoking status: Never     Smokeless tobacco: Never   Vaping Use     Vaping Use: Never used   Substance and Sexual Activity     Alcohol use: Yes     Comment: Alcoholic Drinks/day: rare     Drug use: Never     Sexual activity: Yes     Partners: Female     Birth control/protection: Pill   Other Topics Concern     Not on file   Social History Narrative    Lives with his wife, Dara.  Son Ted (2002) and Sophie (2004).  Works as [employer] at [employer].  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       Social Determinants of Health     Financial Resource Strain: Low Risk  (1/2/2024)    Financial Resource Strain      Within the past 12 months, have you or your family members you live with been unable to get utilities (heat, electricity) when it was really needed?: No   Food Insecurity: Low Risk  (1/2/2024)    Food Insecurity      Within the past 12 months, did you worry that your food would run out before you got money to buy more?: No      Within the past 12 months, did the food you bought just not last and you didn t have money to get more?: No   Transportation Needs: Low Risk  (1/2/2024)    Transportation Needs      Within the past 12 months, has lack of transportation kept you from medical appointments, getting your medicines, non-medical meetings or appointments, work, or from getting things that you need?: No   Physical Activity: Insufficiently Active (1/2/2024)    Exercise Vital Sign      Days of Exercise per Week: 3 days      Minutes of Exercise per Session: 30 min   Stress: No  Stress Concern Present (1/2/2024)    [employer] [place] [employer] - Occupational Stress Questionnaire      Feeling of Stress : Not at all   Social Connections: Socially Integrated (1/2/2024)    Social Connection and Isolation Panel [NHANES]      Frequency of Communication with Friends and Family: More than three times a week      Frequency of Social Gatherings with Friends and Family: Once a week      Attends Worship Services: More than 4 times per year      Active Member of Clubs or Organizations: Yes      Attends Club or Organization Meetings: More than 4 times per year      Marital Status:    Interpersonal Safety: Low Risk  (12/1/2023)    Interpersonal Safety      Do you feel physically and emotionally safe where you currently live?: Yes      Within the past 12 months, have you been hit, slapped, kicked or otherwise physically hurt by someone?: No      Within the past 12 months, have you been humiliated or emotionally abused in other ways by your partner or ex-partner?: No   Housing Stability: High Risk (1/2/2024)    Housing Stability      Do you have housing? : Yes      Are you worried about losing your housing?: Yes       Outpatient Encounter Medications as of 3/13/2024   Medication Sig Dispense Refill     acetaminophen (TYLENOL) 500 MG tablet Take 2 tablets (1,000 mg) by mouth every 6 hours as needed for mild pain       albuterol (PROAIR HFA/PROVENTIL HFA/VENTOLIN HFA) 108 (90 Base) MCG/ACT inhaler Inhale 2 puffs into the lungs every 6 hours as needed for shortness of breath, wheezing or cough 18 g 0     Continuous Blood Gluc Sensor (FREESTYLE LUKE 2 SENSOR) MISC Change every 14 days. 6 each 1     ibuprofen (ADVIL/MOTRIN) 200 MG tablet Take 800 mg by mouth every 4 hours as needed for pain       losartan (COZAAR) 25 MG tablet Take 1 tablet (25 mg) by mouth daily 90 tablet 1     multivitamin w/minerals (MULTIVITAMIN, THERAPEUTIC WITH MINERALS) tablet Take 1 tablet by mouth daily (Patient  not taking: Reported on 1/2/2024)       ondansetron (ZOFRAN) 8 MG tablet Take 8 mg by mouth every 8 hours as needed       oxyBUTYnin (DITROPAN) 5 MG tablet Take 1 tablet (5 mg) by mouth 3 times daily as needed for bladder spasms 30 tablet 1     oxyCODONE (ROXICODONE) 5 MG tablet Take 5 mg by mouth every 6 hours as needed for pain       Semaglutide, 1 MG/DOSE, (OZEMPIC) 4 MG/3ML pen Inject 1 mg Subcutaneous every 7 days 3 mL 0     STATIN NOT PRESCRIBED (INTENTIONAL) Please choose reason not prescribed from choices below.       tamsulosin (FLOMAX) 0.4 MG capsule Take 0.4 mg by mouth daily       testosterone (ANDROGEL 1.62 % PUMP) 20.25 MG/ACT gel Place 2 Pump (40.5 mg) onto the skin daily Apply from dispenser to clean, dry, intact skin of the upper arms and shoulders. 75 g 1     No facility-administered encounter medications on file as of 3/13/2024.             O:   NAD, WDWN, Alert & Oriented, Mood & Affect wnl, Vitals stable   General appearance normal   Vitals stable   Alert, oriented and in no acute distress      Following lymph nodes palpated: Occipital, Cervical, Supraclavicular no lad  pigmented macules on trunk and ext with regular borders and pigment networks        Stuck on papules and brown macules on trunk and ext   Red papules on trunk  Flesh colored papules on trunk     The remainder of the full exam was normal; the following areas were examined:  conjunctiva/lids, , neck, peripheral vascular system, abdomen, lymph nodes, digits/nails, eccrine and apocrine glands, scalp/hair, face, neck, chest, abdomen, buttocks, back, RUE, LUE, RLE, LLE       Eyes: Conjunctivae/lids:Normal     ENT: Lips, buccal mucosa, tongue: normal    MSK:Normal    Cardiovascular: peripheral edema none    Pulm: Breathing Normal    Lymph Nodes: No Head and Neck Lymphadenopathy     Neuro/Psych: Orientation:Alert and Orientedx3 ; Mood/Affect:normal       A/P:  1. Seborrheic keratosis, lentigo, angioma, dermal nevus, nevi   It was a  pleasure speaking to Juanito Delacruz today.  Previous clinic notes and pertinent laboratory tests were reviewed prior to Juanito Delacruz's visit.  Nature and genetics of benign skin lesions dicussed with patient.  Signs and Symptoms of skin cancer discussed with patient.  Patient encouraged to perform monthly skin exams.  UV precautions reviewed with patient.  Risks of non-melanoma skin cancer discussed with patient   Return to clinic 12 months      Again, thank you for allowing me to participate in the care of your patient.        Sincerely,        Nolan Mcdowell MD

## 2024-03-13 NOTE — NURSING NOTE
Chief Complaint   Patient presents with    Skin Check     R inner ear, scalp       There were no vitals filed for this visit.  Wt Readings from Last 1 Encounters:   01/15/24 122.9 kg (270 lb 14.4 oz)       Shyla Rutherford LPN .................3/13/2024

## 2024-03-15 ENCOUNTER — TRANSFERRED RECORDS (OUTPATIENT)
Dept: MULTI SPECIALTY CLINIC | Facility: CLINIC | Age: 59
End: 2024-03-15
Payer: COMMERCIAL

## 2024-03-15 LAB — RETINOPATHY: NORMAL

## 2024-03-23 ENCOUNTER — HOSPITAL ENCOUNTER (OUTPATIENT)
Dept: ULTRASOUND IMAGING | Facility: HOSPITAL | Age: 59
Discharge: HOME | End: 2024-03-23
Attending: UROLOGY | Admitting: UROLOGY
Payer: COMMERCIAL

## 2024-03-23 DIAGNOSIS — N20.1 CALCULUS OF URETER: ICD-10-CM

## 2024-03-23 PROCEDURE — 76770 US EXAM ABDO BACK WALL COMP: CPT

## 2024-04-01 DIAGNOSIS — E11.65 TYPE 2 DIABETES MELLITUS WITH HYPERGLYCEMIA, WITHOUT LONG-TERM CURRENT USE OF INSULIN (H): ICD-10-CM

## 2024-04-01 NOTE — TELEPHONE ENCOUNTER
Requested Prescriptions   Pending Prescriptions Disp Refills    Semaglutide (1 MG/DOSE) (OZEMPIC) 4 MG/3ML pen 3 mL 0     Sig: Inject 1 mg Subcutaneous every 7 days       GLP-1 Agonists Protocol Passed - 4/1/2024  2:29 PM        Passed - HgbA1C in past 3 or 6 months     If HgbA1C is 8 or greater, it needs to be on file within the past 3 months.  If less than 8, must be on file within the past 6 months.     Recent Labs   Lab Test 01/02/24  1505   A1C 5.7*             Passed - Medication is active on med list        Passed - Has GFR on file in past 12 months and most recent value is normal        Passed - Recent (6 mo) or future (90 days) visit within the authorizing provider's specialty     The patient must have completed an in-person or virtual visit within the past 6 months or has a future visit scheduled within the next 90 days with the authorizing provider s specialty.  Urgent care and e-visits do not quality as an office visit for this protocol.          Passed - Medication indicated for associated diagnosis     Medication is associated with one or more of the following diagnoses:     Type 2 diabetes mellitus           Passed - Patient is age 18 or older

## 2024-04-05 ENCOUNTER — OFFICE VISIT (OUTPATIENT)
Dept: INTERNAL MEDICINE | Facility: CLINIC | Age: 59
End: 2024-04-05
Payer: COMMERCIAL

## 2024-04-05 VITALS
HEART RATE: 81 BPM | SYSTOLIC BLOOD PRESSURE: 134 MMHG | WEIGHT: 276 LBS | DIASTOLIC BLOOD PRESSURE: 79 MMHG | HEIGHT: 70 IN | RESPIRATION RATE: 16 BRPM | TEMPERATURE: 97.8 F | OXYGEN SATURATION: 96 % | BODY MASS INDEX: 39.51 KG/M2

## 2024-04-05 DIAGNOSIS — E55.9 VITAMIN D DEFICIENCY: ICD-10-CM

## 2024-04-05 DIAGNOSIS — G47.33 OSA ON CPAP: ICD-10-CM

## 2024-04-05 DIAGNOSIS — Z12.5 SCREENING FOR PROSTATE CANCER: ICD-10-CM

## 2024-04-05 DIAGNOSIS — E11.9 TYPE 2 DIABETES MELLITUS WITHOUT COMPLICATION, WITHOUT LONG-TERM CURRENT USE OF INSULIN (H): ICD-10-CM

## 2024-04-05 DIAGNOSIS — E29.1 HYPOGONADISM MALE: ICD-10-CM

## 2024-04-05 DIAGNOSIS — Z00.00 ANNUAL PHYSICAL EXAM: Primary | ICD-10-CM

## 2024-04-05 DIAGNOSIS — K76.0 FATTY LIVER: ICD-10-CM

## 2024-04-05 DIAGNOSIS — N20.0 KIDNEY STONE: ICD-10-CM

## 2024-04-05 DIAGNOSIS — E66.01 MORBID OBESITY (H): ICD-10-CM

## 2024-04-05 DIAGNOSIS — R05.1 ACUTE COUGH: ICD-10-CM

## 2024-04-05 DIAGNOSIS — Z86.0100 HISTORY OF COLONIC POLYPS: ICD-10-CM

## 2024-04-05 LAB
ALBUMIN SERPL BCG-MCNC: 4.5 G/DL (ref 3.5–5.2)
ALBUMIN UR-MCNC: NEGATIVE MG/DL
ALP SERPL-CCNC: 51 U/L (ref 40–150)
ALT SERPL W P-5'-P-CCNC: 28 U/L (ref 0–70)
ANION GAP SERPL CALCULATED.3IONS-SCNC: 8 MMOL/L (ref 7–15)
APPEARANCE UR: CLEAR
AST SERPL W P-5'-P-CCNC: 24 U/L (ref 0–45)
BILIRUB SERPL-MCNC: 0.6 MG/DL
BILIRUB UR QL STRIP: NEGATIVE
BUN SERPL-MCNC: 19.9 MG/DL (ref 8–23)
CALCIUM SERPL-MCNC: 9.1 MG/DL (ref 8.6–10)
CHLORIDE SERPL-SCNC: 105 MMOL/L (ref 98–107)
CHOLEST SERPL-MCNC: 177 MG/DL
COLOR UR AUTO: YELLOW
CREAT SERPL-MCNC: 1.14 MG/DL (ref 0.67–1.17)
CREAT UR-MCNC: 175 MG/DL
DEPRECATED HCO3 PLAS-SCNC: 28 MMOL/L (ref 22–29)
EGFRCR SERPLBLD CKD-EPI 2021: 74 ML/MIN/1.73M2
ERYTHROCYTE [DISTWIDTH] IN BLOOD BY AUTOMATED COUNT: 12.7 % (ref 10–15)
FASTING STATUS PATIENT QL REPORTED: ABNORMAL
GLUCOSE SERPL-MCNC: 108 MG/DL (ref 70–99)
GLUCOSE UR STRIP-MCNC: NEGATIVE MG/DL
HBA1C MFR BLD: 5.6 % (ref 0–5.6)
HCT VFR BLD AUTO: 45.3 % (ref 40–53)
HDLC SERPL-MCNC: 33 MG/DL
HGB BLD-MCNC: 15.4 G/DL (ref 13.3–17.7)
HGB UR QL STRIP: NEGATIVE
KETONES UR STRIP-MCNC: NEGATIVE MG/DL
LDLC SERPL CALC-MCNC: 118 MG/DL
LEUKOCYTE ESTERASE UR QL STRIP: NEGATIVE
MCH RBC QN AUTO: 30.4 PG (ref 26.5–33)
MCHC RBC AUTO-ENTMCNC: 34 G/DL (ref 31.5–36.5)
MCV RBC AUTO: 90 FL (ref 78–100)
MICROALBUMIN UR-MCNC: <12 MG/L
MICROALBUMIN/CREAT UR: NORMAL MG/G{CREAT}
NITRATE UR QL: NEGATIVE
NONHDLC SERPL-MCNC: 144 MG/DL
PH UR STRIP: 5.5 [PH] (ref 5–8)
PLATELET # BLD AUTO: 174 10E3/UL (ref 150–450)
POTASSIUM SERPL-SCNC: 4.2 MMOL/L (ref 3.4–5.3)
PROT SERPL-MCNC: 7.4 G/DL (ref 6.4–8.3)
PSA SERPL DL<=0.01 NG/ML-MCNC: 1.73 NG/ML (ref 0–3.5)
RBC # BLD AUTO: 5.06 10E6/UL (ref 4.4–5.9)
SODIUM SERPL-SCNC: 141 MMOL/L (ref 135–145)
SP GR UR STRIP: 1.02 (ref 1–1.03)
TRIGL SERPL-MCNC: 129 MG/DL
TSH SERPL DL<=0.005 MIU/L-ACNC: 0.6 UIU/ML (ref 0.3–4.2)
UROBILINOGEN UR STRIP-ACNC: 0.2 E.U./DL
VIT D+METAB SERPL-MCNC: 15 NG/ML (ref 20–50)
WBC # BLD AUTO: 6 10E3/UL (ref 4–11)

## 2024-04-05 PROCEDURE — G0103 PSA SCREENING: HCPCS | Performed by: INTERNAL MEDICINE

## 2024-04-05 PROCEDURE — 82043 UR ALBUMIN QUANTITATIVE: CPT | Performed by: INTERNAL MEDICINE

## 2024-04-05 PROCEDURE — 99214 OFFICE O/P EST MOD 30 MIN: CPT | Mod: 25 | Performed by: INTERNAL MEDICINE

## 2024-04-05 PROCEDURE — 80053 COMPREHEN METABOLIC PANEL: CPT | Performed by: INTERNAL MEDICINE

## 2024-04-05 PROCEDURE — 81003 URINALYSIS AUTO W/O SCOPE: CPT | Performed by: INTERNAL MEDICINE

## 2024-04-05 PROCEDURE — 80061 LIPID PANEL: CPT | Performed by: INTERNAL MEDICINE

## 2024-04-05 PROCEDURE — 82306 VITAMIN D 25 HYDROXY: CPT | Performed by: INTERNAL MEDICINE

## 2024-04-05 PROCEDURE — 36415 COLL VENOUS BLD VENIPUNCTURE: CPT | Performed by: INTERNAL MEDICINE

## 2024-04-05 PROCEDURE — 84443 ASSAY THYROID STIM HORMONE: CPT | Performed by: INTERNAL MEDICINE

## 2024-04-05 PROCEDURE — 85027 COMPLETE CBC AUTOMATED: CPT | Performed by: INTERNAL MEDICINE

## 2024-04-05 PROCEDURE — 99396 PREV VISIT EST AGE 40-64: CPT | Performed by: INTERNAL MEDICINE

## 2024-04-05 PROCEDURE — 83036 HEMOGLOBIN GLYCOSYLATED A1C: CPT | Performed by: INTERNAL MEDICINE

## 2024-04-05 PROCEDURE — 82570 ASSAY OF URINE CREATININE: CPT | Performed by: INTERNAL MEDICINE

## 2024-04-05 RX ORDER — ROSUVASTATIN CALCIUM 5 MG/1
5 TABLET, COATED ORAL DAILY
Qty: 90 TABLET | Refills: 4 | Status: SHIPPED | OUTPATIENT
Start: 2024-04-05

## 2024-04-05 RX ORDER — ALBUTEROL SULFATE 90 UG/1
2 AEROSOL, METERED RESPIRATORY (INHALATION) EVERY 6 HOURS PRN
Qty: 18 G | Refills: 2 | Status: SHIPPED | OUTPATIENT
Start: 2024-04-05

## 2024-04-05 SDOH — HEALTH STABILITY: PHYSICAL HEALTH: ON AVERAGE, HOW MANY DAYS PER WEEK DO YOU ENGAGE IN MODERATE TO STRENUOUS EXERCISE (LIKE A BRISK WALK)?: 3 DAYS

## 2024-04-05 ASSESSMENT — SOCIAL DETERMINANTS OF HEALTH (SDOH): HOW OFTEN DO YOU GET TOGETHER WITH FRIENDS OR RELATIVES?: ONCE A WEEK

## 2024-04-05 NOTE — PROGRESS NOTES
Office Visit - Physical   Juanito Delacruz   58 year old  male    Date of visit: 4/5/2024  Physician: Archie Mosher MD     Assessment and Plan   1. Annual physical exam  This is a 58-year-old man with issues as discussed below.  Reviewed potential vaccinations and he prefers to get these at the pharmacy.    2. Screening for prostate cancer  - Prostate Specific Antigen Screen; Future  - Prostate Specific Antigen Screen    3. History of colonic polyps  He had a colonoscopy in 2020 and a 7-year follow-up was recommended    4. Acute cough  - albuterol (PROAIR HFA/PROVENTIL HFA/VENTOLIN HFA) 108 (90 Base) MCG/ACT inhaler; Inhale 2 puffs into the lungs every 6 hours as needed for shortness of breath, wheezing or cough  Dispense: 18 g; Refill: 2    5. Type 2 diabetes mellitus without complication, without long-term current use of insulin (H)  Has been well-controlled.  Continue Ozempic discussed dose titration which will hold off on now given his motility issues.  He had a coronary calcium score of 0 we discussed that this predicts a low 10-year risk of heart attack but given diabetes he is at higher risk and therefore he will start rosuvastatin 5 mg daily we could consider repeating a coronary calcium scan a couple of years.  - Albumin Random Urine Quantitative with Creat Ratio; Future  - Comprehensive metabolic panel; Future  - Hemoglobin A1c; Future  - Lipid panel reflex to direct LDL Fasting; Future  - UA Macroscopic with reflex to Microscopic and Culture; Future  - TSH with free T4 reflex; Future  - Albumin Random Urine Quantitative with Creat Ratio  - Comprehensive metabolic panel  - Hemoglobin A1c  - Lipid panel reflex to direct LDL Fasting  - UA Macroscopic with reflex to Microscopic and Culture  - TSH with free T4 reflex    6. Fatty liver  Continue with reduction in calories carbohydrates regular exercise and modest weight loss  - CBC with platelets; Future  - CBC with platelets    7. JUAN on CPAP    8. Kidney  stone  Reviewed surgeries with Dr. Pritchett    9. Hypogonadism male  Is on testosterone    10. Vitamin D deficiency  - Vitamin D Deficiency; Future  - Vitamin D Deficiency    11. Morbid obesity (H)  The following high BMI interventions were performed this visit: encouragement to exercise and lifestyle education regarding diet    Return in about 6 months (around 10/5/2024) for Follow up.     Chief Complaint   Chief Complaint   Patient presents with    Physical     Fasting for labs     Follow Up     Pt still notes mild discomfort with kidney pain          Patient Profile   Social History     Social History Narrative    Lives with his wife, Dara.  Son Ted (2002, diesel mechanical) and Sophie (2004, school echo tech).  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          Past Medical History   Patient Active Problem List   Diagnosis    Chronic suppurative otitis media    Cervicalgia    Lumbago    Fatty liver    History of colonic polyps    Morbid obesity (H)    JUAN on CPAP    Type 2 diabetes mellitus without complication, without long-term current use of insulin (H)    Kidney stone    Hypogonadism male       Past Surgical History  He has a past surgical history that includes Cystoureteroscopy, With Lithotripsy Using Jalen 120P Laser And Ureteral Stent Insertion (Right, 12/12/2023); Parotidectomy (Right, 12/26/2023); and Cystoureteroscopy, With Lithotripsy Using Jalen 120P Laser And Ureteral Stent Insertion (Right, 01/15/2024).     History of Present Illness   This 58 year old man comes in for annual follow-up and physical.  Overall he is doing well.  Since I saw him last he has had 2 kidney stone surgeries and a parotid surgery.  All went okay.  A little bit of numbness on the right side of his face after parotid surgery which is improving.  Could not shake for a little while but now thinks he can shave although he is wearing a beard.  He is on Ozempic 1 mg weekly and has  some constipation and slow motility from this.  He has lost about 30 pounds over the last year.  He is not on a statin as a CT coronary calcium scan showed a calcium scan of 0.    Review of Systems: A comprehensive review of systems was negative except as noted.     Medications and Allergies   Current Outpatient Medications   Medication Sig Dispense Refill    acetaminophen (TYLENOL) 500 MG tablet Take 2 tablets (1,000 mg) by mouth every 6 hours as needed for mild pain      albuterol (PROAIR HFA/PROVENTIL HFA/VENTOLIN HFA) 108 (90 Base) MCG/ACT inhaler Inhale 2 puffs into the lungs every 6 hours as needed for shortness of breath, wheezing or cough 18 g 2    Continuous Blood Gluc Sensor (FREESTYLE LUKE 2 SENSOR) MISC Change every 14 days. 6 each 1    losartan (COZAAR) 25 MG tablet Take 1 tablet (25 mg) by mouth daily 90 tablet 1    multivitamin w/minerals (MULTIVITAMIN, THERAPEUTIC WITH MINERALS) tablet Take 1 tablet by mouth daily      Semaglutide, 1 MG/DOSE, (OZEMPIC) 4 MG/3ML pen Inject 1 mg Subcutaneous every 7 days 3 mL 2    STATIN NOT PRESCRIBED (INTENTIONAL) Please choose reason not prescribed from choices below.      testosterone (ANDROGEL 1.62 % PUMP) 20.25 MG/ACT gel Place 2 Pump (40.5 mg) onto the skin daily Apply from dispenser to clean, dry, intact skin of the upper arms and shoulders. 75 g 1     No Known Allergies     Family and Social History   Family History   Problem Relation Age of Onset    Pancreatic Cancer Mother         islet cell    Diabetes Father     Neuropathy Father     Dementia Father     No Known Problems Sister     Obesity Brother     No Known Problems Brother     No Known Problems Daughter     No Known Problems Son         Social History     Tobacco Use    Smoking status: Never    Smokeless tobacco: Never   Vaping Use    Vaping Use: Never used   Substance Use Topics    Alcohol use: Yes     Comment: Alcoholic Drinks/day: rare    Drug use: Never        Physical Exam   General Appearance:    "No acute distress    /79 (BP Location: Left arm, Patient Position: Sitting, Cuff Size: Adult Regular)   Pulse 81   Temp 97.8  F (36.6  C) (Tympanic)   Resp 16   Ht 1.778 m (5' 10\")   Wt 125.2 kg (276 lb)   SpO2 96%   BMI 39.60 kg/m      EYES: Eyelids, conjunctiva, and sclera were normal. Pupils were normal. Cornea, iris, and lens were normal bilaterally.  HEAD, EARS, NOSE, MOUTH, AND THROAT: Head and face were normal. Hearing was normal to voice and the ears were normal to external exam. Nose appearance was normal and there was no discharge.   NECK: Neck appearance was normal.   RESPIRATORY: Breathing pattern was normal and the chest moved symmetrically.  Percussion/auscultatory percussion was normal.  Lung sounds were normal and there were no abnormal sounds.  CARDIOVASCULAR: Heart rate and rhythm were normal.  S1 and S2 were normal and there were no extra sounds or murmurs. Peripheral pulses in arms and legs were normal.  Jugular venous pressure was normal.  There was no peripheral edema.  GASTROINTESTINAL: The abdomen was normal in contour.    NEUROLOGIC: The patient was alert and oriented to person, place, time, and circumstance. Speech was normal. Cranial nerves were normal. Motor strength was normal for age. The patient was normally coordinated.  PSYCHIATRIC:  Mood and affect were normal and the patient had normal recent and remote memory. The patient's judgment and insight were normal.       Additional Information        Archie Mosher MD  Internal Medicine  Contact me at [phone]  "

## 2024-04-10 DIAGNOSIS — R79.89 LOW TESTOSTERONE: ICD-10-CM

## 2024-04-10 RX ORDER — TESTOSTERONE 20.25 MG/1.25G
GEL TOPICAL
Qty: 75 G | Refills: 1 | Status: SHIPPED | OUTPATIENT
Start: 2024-04-10 | End: 2024-07-17

## 2024-05-08 ENCOUNTER — MYC MEDICAL ADVICE (OUTPATIENT)
Dept: INTERNAL MEDICINE | Facility: CLINIC | Age: 59
End: 2024-05-08
Payer: COMMERCIAL

## 2024-05-08 DIAGNOSIS — E66.01 MORBID OBESITY (H): ICD-10-CM

## 2024-05-08 DIAGNOSIS — E11.9 TYPE 2 DIABETES MELLITUS WITHOUT COMPLICATION, WITHOUT LONG-TERM CURRENT USE OF INSULIN (H): Primary | ICD-10-CM

## 2024-06-04 ENCOUNTER — OFFICE VISIT (OUTPATIENT)
Dept: ENDOCRINOLOGY | Facility: CLINIC | Age: 59
End: 2024-06-04
Payer: COMMERCIAL

## 2024-06-04 VITALS
DIASTOLIC BLOOD PRESSURE: 90 MMHG | BODY MASS INDEX: 39.54 KG/M2 | SYSTOLIC BLOOD PRESSURE: 144 MMHG | WEIGHT: 276.2 LBS | HEIGHT: 70 IN | OXYGEN SATURATION: 98 % | RESPIRATION RATE: 18 BRPM | TEMPERATURE: 97.5 F | HEART RATE: 77 BPM

## 2024-06-04 DIAGNOSIS — E55.9 VITAMIN D DEFICIENCY: ICD-10-CM

## 2024-06-04 DIAGNOSIS — R79.89 LOW TESTOSTERONE: Primary | ICD-10-CM

## 2024-06-04 DIAGNOSIS — E11.9 TYPE 2 DIABETES MELLITUS WITHOUT COMPLICATION, WITHOUT LONG-TERM CURRENT USE OF INSULIN (H): ICD-10-CM

## 2024-06-04 PROCEDURE — 84403 ASSAY OF TOTAL TESTOSTERONE: CPT | Performed by: PHYSICIAN ASSISTANT

## 2024-06-04 PROCEDURE — 80061 LIPID PANEL: CPT | Performed by: PHYSICIAN ASSISTANT

## 2024-06-04 PROCEDURE — 84270 ASSAY OF SEX HORMONE GLOBUL: CPT | Performed by: PHYSICIAN ASSISTANT

## 2024-06-04 PROCEDURE — 99214 OFFICE O/P EST MOD 30 MIN: CPT | Performed by: PHYSICIAN ASSISTANT

## 2024-06-04 PROCEDURE — 82306 VITAMIN D 25 HYDROXY: CPT | Performed by: PHYSICIAN ASSISTANT

## 2024-06-04 PROCEDURE — 36415 COLL VENOUS BLD VENIPUNCTURE: CPT | Performed by: PHYSICIAN ASSISTANT

## 2024-06-04 NOTE — LETTER
6/4/2024      Juanito Delacruz  [address]  [facility] [address]      Dear Colleague,    Thank you for referring your patient, Juanito Delacruz, to the [facility]. Please see a copy of my visit note below.    Assessment/Plan :   Type 2 DM. Ken is doing great. We reviewed the adverse effects of Ozempic. We also discussed the differences in dosing. He recently increased to 2 mg weekly. He is doing okay. He does have some occasional constipation and we discussed options to help with the constipation. We also reviewed the importance of close glucose monitoring. I will give him a Gio 3 sensor today, to try. If he has any problems, he will contact our office.  Low testosterone. He has been making an effort to apply the testosterone more consistently. However, he is still struggling with fatigue. We will repeat a free and total testosterone today. I will contact him with the results and we will adjust the medication accordingly.   Low vitamin D. His recent laboratory results indicated a low vitamin D. He has been taking 2 capsules daily for th last few weeks. He would like to repeat testing today. A lab order was placed.       I have independently reviewed and interpreted labs, imaging as indicated.      Chief complaint:  Juanito is a 58 year old male who returns for follow-up of Type 2 DM and low testosterone.    I have reviewed Care Everywhere including [facility], [facility], [facility] , [facility], [facility] lab reports, imaging reports and provider notes as indicated.      HISTORY OF PRESENT ILLNESS  Ken is doing okay. He has continued to take Ozempic weekly. His primary care provider recently increased the dose to 2 mg weekly, to help increase weight loss. His blood sugars seem to be stabilizing. His recent hemoglobin A1C was down to 5.6%. Unfortunately, he is still really struggling with fatigue. He states that there are days when he just  feels exhausted. He has continued to apply testosterone gel daily but he is not sure it is really helping.     Ken has been struggling with fatigue and weight gain for at least 3 yrs. He was previously diagnosed with pre-diabetes and then his A1C increased to the diabetic range. He has also been found to have low testosterone. We started him on topical testosterone replacement in June of 2023. He has not had any adverse effects from the topical therapy but he is also not sure that it is doing much. He also has a history of vitamin D deficiency and recurrent kidney stones.    Endocrine relevant labs are as follows:   Latest Reference Range & Units 04/05/24 08:58   Hemoglobin A1C 0.0 - 5.6 % 5.6      Latest Reference Range & Units 04/05/24 08:58   Vitamin D, Total (25-Hydroxy) 20 - 50 ng/mL 15 (L)   (L): Data is abnormally low   Latest Reference Range & Units 04/05/24 09:02   Albumin Urine mg/L mg/L <12.0     REVIEW OF SYSTEMS    Endocrine: positive for diabetes, obesity, and low testosterone  Skin: negative  Eyes: positive for visual blurring, cataracts, negative for, redness, tearing  Ears/Nose/Throat: negative  Respiratory: No shortness of breath, dyspnea on exertion, cough, or hemoptysis  Cardiovascular: negative for, palpitations, chest pain, lower extremity edema, and exercise intolerance  Gastrointestinal: positive for constipation, negative for, nausea, vomiting, and diarrhea  Genitourinary: negative for, nocturia, dysuria, frequency, and urgency  Musculoskeletal: negative for, muscular weakness, nocturnal cramping, and foot pain  Neurologic: negative for, local weakness, numbness or tingling of hands, and numbness or tingling of feet  Psychiatric: negative  Hematologic/Lymphatic/Immunologic: negative    Past Medical History  Past Medical History:   Diagnosis Date     Cervicalgia     Non-specific Cervical Spine Pain     Diabetes mellitus, type 2 (H)      Diabetic ketoacidosis without coma associated with type  "2 diabetes mellitus (H)      Fatty liver      History of colonic polyps      Kidney stone      Lumbago     Mechanical Low Back Pain     Morbid obesity (H)      Motion sickness      Muscular wasting and disuse atrophy, not elsewhere classified     Deconditioning Syndrome     Parotid nodule      Sleep apnea     cpap     Unspecified chronic suppurative otitis media      Ureteral stone        Medications  Current Outpatient Medications   Medication Sig Dispense Refill     acetaminophen (TYLENOL) 500 MG tablet Take 2 tablets (1,000 mg) by mouth every 6 hours as needed for mild pain       albuterol (PROAIR HFA/PROVENTIL HFA/VENTOLIN HFA) 108 (90 Base) MCG/ACT inhaler Inhale 2 puffs into the lungs every 6 hours as needed for shortness of breath, wheezing or cough 18 g 2     losartan (COZAAR) 25 MG tablet Take 1 tablet (25 mg) by mouth daily 90 tablet 1     rosuvastatin (CRESTOR) 5 MG tablet Take 1 tablet (5 mg) by mouth daily 90 tablet 4     Semaglutide, 2 MG/DOSE, (OZEMPIC) 8 MG/3ML pen Inject 2 mg Subcutaneous every 7 days 9 mL 3     Testosterone 1.62 % GEL Place 2 Pump (40.5 mg) onto the skin daily Apply from dispenser to clean, dry, intact skin of the upper arms and shoulders. 75 g 1       Allergies  No Known Allergies      Family History  family history includes Dementia in his father; Diabetes in his father; Neuropathy in his father; No Known Problems in his brother, daughter, sister, and son; Obesity in his brother; Pancreatic Cancer in his mother.    Social History  Social History     Tobacco Use     Smoking status: Never     Smokeless tobacco: Never   Vaping Use     Vaping status: Never Used   Substance Use Topics     Alcohol use: Yes     Comment: Alcoholic Drinks/day: rare     Drug use: Never       Physical Exam  BP (!) 144/90 (BP Location: Left arm, Patient Position: Chair, Cuff Size: Adult Large)   Pulse 77   Temp 97.5  F (36.4  C) (Tympanic)   Resp 18   Ht 1.778 m (5' 10\")   Wt 125.3 kg (276 lb 3.2 oz)   " SpO2 98%   BMI 39.63 kg/m    Body mass index is 39.63 kg/m .  GENERAL :  In no apparent distress  SKIN: Normal color, normal temperature, texture.  No hirsutism, alopecia or purple striae.     EYES: PERRL, EOMI, No scleral icterus,  No proptosis, conjunctival redness, stare, retraction  NEURO: awake, alert, responds appropriately to questions.  Cranial nerves intact.   Moves all extremities; Gait normal.  No tremor of the outstretched hand.    EXTREMITIES: No clubbing, cyanosis or edema.            Again, thank you for allowing me to participate in the care of your patient.        Sincerely,        Barbara Jimenes PA-C

## 2024-06-04 NOTE — PATIENT INSTRUCTIONS
[facility]  Dr Rivas, Endocrinology Department    [address] [id number]  [place] [address]  Appointment [phone]  Fax: [phone]  Breese: Monday - Thursday

## 2024-06-04 NOTE — PROGRESS NOTES
Assessment/Plan :   Type 2 DM. Ken is doing great. We reviewed the adverse effects of Ozempic. We also discussed the differences in dosing. He recently increased to 2 mg weekly. He is doing okay. He does have some occasional constipation and we discussed options to help with the constipation. We also reviewed the importance of close glucose monitoring. I will give him a Gio 3 sensor today, to try. If he has any problems, he will contact our office.  Low testosterone. He has been making an effort to apply the testosterone more consistently. However, he is still struggling with fatigue. We will repeat a free and total testosterone today. I will contact him with the results and we will adjust the medication accordingly.   Low vitamin D. His recent laboratory results indicated a low vitamin D. He has been taking 2 capsules daily for th last few weeks. He would like to repeat testing today. A lab order was placed.       I have independently reviewed and interpreted labs, imaging as indicated.      Chief complaint:  Juanito is a 58 year old male who returns for follow-up of Type 2 DM and low testosterone.    I have reviewed Care Everywhere including [facility], [facility], [facility], [facility], [facility] , [facility], [facility] lab reports, imaging reports and provider notes as indicated.      HISTORY OF PRESENT ILLNESS  Ken is doing okay. He has continued to take Ozempic weekly. His primary care provider recently increased the dose to 2 mg weekly, to help increase weight loss. His blood sugars seem to be stabilizing. His recent hemoglobin A1C was down to 5.6%. Unfortunately, he is still really struggling with fatigue. He states that there are days when he just feels exhausted. He has continued to apply testosterone gel daily but he is not sure it is really helping.     Ken has been struggling with fatigue and weight gain for at least 3 yrs. He was previously diagnosed with pre-diabetes and then  his A1C increased to the diabetic range. He has also been found to have low testosterone. We started him on topical testosterone replacement in June of 2023. He has not had any adverse effects from the topical therapy but he is also not sure that it is doing much. He also has a history of vitamin D deficiency and recurrent kidney stones.    Endocrine relevant labs are as follows:   Latest Reference Range & Units 04/05/24 08:58   Hemoglobin A1C 0.0 - 5.6 % 5.6      Latest Reference Range & Units 04/05/24 08:58   Vitamin D, Total (25-Hydroxy) 20 - 50 ng/mL 15 (L)   (L): Data is abnormally low   Latest Reference Range & Units 04/05/24 09:02   Albumin Urine mg/L mg/L <12.0     REVIEW OF SYSTEMS    Endocrine: positive for diabetes, obesity, and low testosterone  Skin: negative  Eyes: positive for visual blurring, cataracts, negative for, redness, tearing  Ears/Nose/Throat: negative  Respiratory: No shortness of breath, dyspnea on exertion, cough, or hemoptysis  Cardiovascular: negative for, palpitations, chest pain, lower extremity edema, and exercise intolerance  Gastrointestinal: positive for constipation, negative for, nausea, vomiting, and diarrhea  Genitourinary: negative for, nocturia, dysuria, frequency, and urgency  Musculoskeletal: negative for, muscular weakness, nocturnal cramping, and foot pain  Neurologic: negative for, local weakness, numbness or tingling of hands, and numbness or tingling of feet  Psychiatric: negative  Hematologic/Lymphatic/Immunologic: negative    Past Medical History  Past Medical History:   Diagnosis Date    Cervicalgia     Non-specific Cervical Spine Pain    Diabetes mellitus, type 2 (H)     Diabetic ketoacidosis without coma associated with type 2 diabetes mellitus (H)     Fatty liver     History of colonic polyps     Kidney stone     Lumbago     Mechanical Low Back Pain    Morbid obesity (H)     Motion sickness     Muscular wasting and disuse atrophy, not elsewhere classified      "Deconditioning Syndrome    Parotid nodule     Sleep apnea     cpap    Unspecified chronic suppurative otitis media     Ureteral stone        Medications  Current Outpatient Medications   Medication Sig Dispense Refill    acetaminophen (TYLENOL) 500 MG tablet Take 2 tablets (1,000 mg) by mouth every 6 hours as needed for mild pain      albuterol (PROAIR HFA/PROVENTIL HFA/VENTOLIN HFA) 108 (90 Base) MCG/ACT inhaler Inhale 2 puffs into the lungs every 6 hours as needed for shortness of breath, wheezing or cough 18 g 2    losartan (COZAAR) 25 MG tablet Take 1 tablet (25 mg) by mouth daily 90 tablet 1    rosuvastatin (CRESTOR) 5 MG tablet Take 1 tablet (5 mg) by mouth daily 90 tablet 4    Semaglutide, 2 MG/DOSE, (OZEMPIC) 8 MG/3ML pen Inject 2 mg Subcutaneous every 7 days 9 mL 3    Testosterone 1.62 % GEL Place 2 Pump (40.5 mg) onto the skin daily Apply from dispenser to clean, dry, intact skin of the upper arms and shoulders. 75 g 1       Allergies  No Known Allergies      Family History  family history includes Dementia in his father; Diabetes in his father; Neuropathy in his father; No Known Problems in his brother, daughter, sister, and son; Obesity in his brother; Pancreatic Cancer in his mother.    Social History  Social History     Tobacco Use    Smoking status: Never    Smokeless tobacco: Never   Vaping Use    Vaping status: Never Used   Substance Use Topics    Alcohol use: Yes     Comment: Alcoholic Drinks/day: rare    Drug use: Never       Physical Exam  BP (!) 144/90 (BP Location: Left arm, Patient Position: Chair, Cuff Size: Adult Large)   Pulse 77   Temp 97.5  F (36.4  C) (Tympanic)   Resp 18   Ht 1.778 m (5' 10\")   Wt 125.3 kg (276 lb 3.2 oz)   SpO2 98%   BMI 39.63 kg/m    Body mass index is 39.63 kg/m .  GENERAL :  In no apparent distress  SKIN: Normal color, normal temperature, texture.  No hirsutism, alopecia or purple striae.     EYES: PERRL, EOMI, No scleral icterus,  No proptosis, conjunctival " redness, stare, retraction  NEURO: awake, alert, responds appropriately to questions.  Cranial nerves intact.   Moves all extremities; Gait normal.  No tremor of the outstretched hand.    EXTREMITIES: No clubbing, cyanosis or edema.

## 2024-06-05 LAB
CHOLEST SERPL-MCNC: 123 MG/DL
FASTING STATUS PATIENT QL REPORTED: YES
HDLC SERPL-MCNC: 39 MG/DL
LDLC SERPL CALC-MCNC: 64 MG/DL
NONHDLC SERPL-MCNC: 84 MG/DL
SHBG SERPL-SCNC: 34 NMOL/L (ref 11–80)
TRIGL SERPL-MCNC: 101 MG/DL
VIT D+METAB SERPL-MCNC: 23 NG/ML (ref 20–50)

## 2024-06-07 LAB
TESTOST FREE SERPL-MCNC: 5.38 NG/DL
TESTOST SERPL-MCNC: 280 NG/DL (ref 240–950)

## 2024-06-24 ENCOUNTER — TELEPHONE (OUTPATIENT)
Dept: ENDOCRINOLOGY | Facility: CLINIC | Age: 59
End: 2024-06-24
Payer: COMMERCIAL

## 2024-06-24 NOTE — TELEPHONE ENCOUNTER
[facility]  Fax:[phone]  Spec: [phone]  MO: [phone]

## 2024-06-27 NOTE — TELEPHONE ENCOUNTER
Prior Authorization Approval    Medication: XYOSTED 75 MG/0.5ML SC SOAJ  Authorization Effective Date: 6/27/2024  Authorization Expiration Date: 6/26/2025  Approved Dose/Quantity:   Reference #:     Insurance Company: [employer] Phone [phone] Fax [phone]  Expected CoPay: $    CoPay Card Available:      Financial Assistance Needed:   Which Pharmacy is filling the prescription: [facility] - [place] [address]  Pharmacy Notified: YES  Patient Notified: **Instructed pharmacy to notify patient when script is ready to /ship.**

## 2024-06-27 NOTE — TELEPHONE ENCOUNTER
PA Initiation    Medication: XYOSTED 75 MG/0.5ML SC SOAJ  Insurance Company: [employer] - Phone [phone] Fax [phone]  Pharmacy Filling the Rx: [facility] - [place], [place] - [facility] [address]  Filling Pharmacy Phone: [phone]  Filling Pharmacy Fax: [phone]  Start Date: 6/27/2024

## 2024-07-11 ENCOUNTER — TRANSFERRED RECORDS (OUTPATIENT)
Dept: HEALTH INFORMATION MANAGEMENT | Facility: CLINIC | Age: 59
End: 2024-07-11
Payer: COMMERCIAL

## 2024-07-11 LAB — RETINOPATHY: NEGATIVE

## 2024-07-17 ENCOUNTER — TELEPHONE (OUTPATIENT)
Dept: ENDOCRINOLOGY | Facility: CLINIC | Age: 59
End: 2024-07-17
Payer: COMMERCIAL

## 2024-07-17 DIAGNOSIS — R79.89 LOW TESTOSTERONE: ICD-10-CM

## 2024-07-17 NOTE — TELEPHONE ENCOUNTER
Does insurance require pt to use speciality pharmacy? Pt wanting to use [facility].  And insurance only allows 2mL per 28 days correct?  Also wondering if insurance does require speciality pharmacy if there is another injectable similar that pt can use [facility] instead for that pt's insurance does cover.

## 2024-07-17 NOTE — TELEPHONE ENCOUNTER
Spoke to pt-     States deductible is 100$ pt states the pharmacy couldn't tell the pt if it is for 1 month of 3 months. Pt is wondering if there is another option for testosterone that doesn't have to go to a speciality pharmacy. That could go to the [facility] instead.     Pt has more insurance questions. Pt would like to do the increased gel for at least one more month while figuring it out.     Rx pended with new SIG of 4 pumps daily   for provider to sign

## 2024-07-17 NOTE — TELEPHONE ENCOUNTER
[facility] Call Center    Phone Message    May a detailed message be left on voicemail: yes     Reason for Call: Other: Per pt would like to talk about the changed for the medications and other options. Per pt has a few more questions before a makes a decision. Per pt also would like to mention he is now out of his topical gel too. Please call pt back. Thank you.     Action Taken: Message routed to:  [facility] ENDO    Travel Screening: Not Applicable     Date of Service:

## 2024-07-18 DIAGNOSIS — R79.89 LOW TESTOSTERONE: ICD-10-CM

## 2024-07-18 RX ORDER — NEEDLES, DISPOSABLE 25GX5/8"
1 NEEDLE, DISPOSABLE MISCELLANEOUS
Qty: 50 EACH | Refills: 0 | Status: SHIPPED | OUTPATIENT
Start: 2024-07-18

## 2024-07-18 RX ORDER — TESTOSTERONE ENANTHATE 200 MG/ML
INJECTION, SOLUTION INTRAMUSCULAR
Qty: 5 ML | Refills: 1 | Status: SHIPPED | OUTPATIENT
Start: 2024-07-18

## 2024-07-18 RX ORDER — TESTOSTERONE 20.25 MG/1.25G
4 GEL TOPICAL DAILY
Qty: 150 G | Refills: 0 | Status: SHIPPED | OUTPATIENT
Start: 2024-07-18

## 2024-07-18 RX ORDER — TESTOSTERONE 20.25 MG/1.25G
4 GEL TOPICAL DAILY
Qty: 150 G | Refills: 0 | Status: SHIPPED | OUTPATIENT
Start: 2024-07-18 | End: 2024-07-18

## 2024-07-18 NOTE — TELEPHONE ENCOUNTER
Per provider:  Okay, I'll send in a prescription for 1 month of 4 pumps topically daily for the Androgel. We can then switch him to generic testosterone enanthate. He will administer this injection every 2 wks. I'll send both prescriptions today but let's have him continue with the Androgel for the next month.       Spoke to pt and informed of above. Pt verbalized understanding. Informed pt that injection is being sent for PA still and gel was sent to pharmacy already.

## 2024-07-18 NOTE — TELEPHONE ENCOUNTER
Spoke to pt- and informed that pt may be able to fill at preferred pharmacy. Pt states that his wife works at the pharmacy and they do not carry the auto injector xyosted. Pt is wondering if there is another version of testosterone rafael would recommend. Pt would prefer to be able to fill at this pharmacy.

## 2024-07-18 NOTE — TELEPHONE ENCOUNTER
Pt wanted the increase sent for at least 1 month until insurance/alterative could be figured out. Pt is leaving out of town as well.   Pt only wants to use [facility]- pt states they do fill testosterone there, but notes they do not see auto injectors.  Wondering if there is something else provider would consider that is similar.

## 2024-07-18 NOTE — TELEPHONE ENCOUNTER
It shouldn't be locked into any particular pharmacy.  Patient should be able to fill at whatever pharmacy is contracted with insurance plan.

## 2024-07-22 ENCOUNTER — TELEPHONE (OUTPATIENT)
Dept: ENDOCRINOLOGY | Facility: CLINIC | Age: 59
End: 2024-07-22
Payer: COMMERCIAL

## 2024-07-22 NOTE — LETTER
To whom it may concern,    Juanito is a patient that suffers from hypogonadism and is currently on testosterone replacement therapy to treat this diagnosis. Please consider covering testosterone injections, as the patient has a supporting diagnosis for this therapy and a prostate specific antigen screening, which is in the normal range. If there are any questions, please feel free to contact us at [phone].    Sincerely,    TERESA Owen/CHARLES Warner

## 2024-07-24 NOTE — TELEPHONE ENCOUNTER
"Retail Pharmacy Prior Authorization Team   Phone: [phone]      PRIOR AUTHORIZATION DENIED    Medication: TESTOSTERONE ENANTHATE 200 MG/ML IM SOLN  Insurance Company: [employer] - Phone [phone] Fax [phone]  Denial Date: 7/19/2024  Denial Reason(s):               Appeal Information: To send an appeal to the patient's insurance, please provide a letter of medical necessity for the requested medication that was denied.  Please use the denial rationale from the patient's insurance to write the letter.  Once it is completed, please have it available under the \"letters tab\" in the patient's chart and route directly back to me: FABI LOPEZ and I can send the appeal to the patient's insurance.   Patient Notified: No. The [facility] PA Team does not notify of the denial outcomes as the patient often will ask what their provider will prescribe in place of the denied medication, or additional information in regards to other therapies they can take in place of the denied medication.  This is not something we can advise on in our department.      "

## 2024-07-31 NOTE — TELEPHONE ENCOUNTER
Retail Pharmacy Prior Authorization Team   Phone: [phone]        Medication Appeal Initiation    Medication: TESTOSTERONE ENANTHATE 200 MG/ML IM SOLN  Appeal Start Date:  7/31/2024  Insurance Company: [employer] Phone:   Insurance Fax:   Comments:             FAXED VIA RIGHT FAX

## 2024-08-02 NOTE — TELEPHONE ENCOUNTER
Retail Pharmacy Prior Authorization Team   Phone: [phone]      MEDICATION APPEAL APPROVED    Medication: TESTOSTERONE ENANTHATE 200 MG/ML IM SOLN  Authorization Effective Date: 8/2/2024  Authorization Expiration Date: 8/1/2025  Approved Dose/Quantity:   Reference #:     Appeal Insurance Company:   Expected CoPay: $       CoPay Card Available: No  Financial Assistance Needed:   Filling Pharmacy: [facility] [id number] [place] [address]  Patient Notified: **Instructed pharmacy to notify patient when script is ready to /ship.**  Comments:

## 2024-08-25 ENCOUNTER — HEALTH MAINTENANCE LETTER (OUTPATIENT)
Age: 59
End: 2024-08-25

## 2024-09-26 ENCOUNTER — ANESTHESIA EVENT (OUTPATIENT)
Dept: SURGERY | Facility: CLINIC | Age: 59
End: 2024-09-26
Payer: COMMERCIAL

## 2024-09-26 NOTE — ANESTHESIA PREPROCEDURE EVALUATION
Anesthesia Pre-Procedure Evaluation    Patient: Juanito Delacruz   MRN: [id number] : 1965        Procedure : Procedure(s):  Cataract extraction with intraocular lens placement          Past Medical History:   Diagnosis Date    Cervicalgia     Non-specific Cervical Spine Pain    Diabetes mellitus, type 2 (H)     Diabetic ketoacidosis without coma associated with type 2 diabetes mellitus (H)     Fatty liver     History of colonic polyps     Kidney stone     Lumbago     Mechanical Low Back Pain    Morbid obesity (H)     Motion sickness     Muscular wasting and disuse atrophy, not elsewhere classified     Deconditioning Syndrome    Parotid nodule     Sleep apnea     cpap    Unspecified chronic suppurative otitis media     Ureteral stone       Past Surgical History:   Procedure Laterality Date    CYSTOURETEROSCOPY, WITH LITHOTRIPSY USING LETY 120P LASER AND URETERAL STENT INSERTION Right 2023    Procedure: CYSTOSCOPY, RIGHT RETROGRADE PYELOGRAM, RIGHT URETEROSCOPY WITH LASER STANDBY, RIGHT URETERAL STENT PLACEMENT;  Surgeon: Reginaldo Pritchett MD;  Location: [facility] OR    CYSTOURETEROSCOPY, WITH LITHOTRIPSY USING LETY 120P LASER AND URETERAL STENT INSERTION Right 01/15/2024    Procedure: CYSTOSCOPY RIGHT RETROGRADE PYELOGRAM, RIGHT URETEROSCOPY WITH LASER LITHOTRIPSY, RIGHT URETERAL STENT EXCHANGE;  Surgeon: Reginaldo Pritchett MD;  Location: [facility] OR    PAROTIDECTOMY Right 2023    Procedure: RIGHT SUPERFICIAL PAROTIDECTOMY;  Surgeon: Peggy Reese MD;  Location: [facility] OR      No Known Allergies   Social History     Tobacco Use    Smoking status: Never    Smokeless tobacco: Never   Substance Use Topics    Alcohol use: Yes     Comment: Alcoholic Drinks/day: rare      Wt Readings from Last 1 Encounters:   24 125.3 kg (276 lb 3.2 oz)        Anesthesia Evaluation   Pt has had prior anesthetic. Type: General and MAC.    History of anesthetic complications  - motion  "sickness.      ROS/MED HX  ENT/Pulmonary:     (+) sleep apnea, uses CPAP,                                      Neurologic:  - neg neurologic ROS     Cardiovascular:  - neg cardiovascular ROS     METS/Exercise Tolerance:     Hematologic:  - neg hematologic  ROS     Musculoskeletal:  - neg musculoskeletal ROS     GI/Hepatic: Comment: Fatty liver    (+)             liver disease,       Renal/Genitourinary:     (+)     Pt has history of transplant,         Endo:     (+) type I DM,              Obesity,       Psychiatric/Substance Use:  - neg psychiatric ROS     Infectious Disease:  - neg infectious disease ROS     Malignancy:  - neg malignancy ROS     Other:            Physical Exam    Airway  airway exam normal      Mallampati: II   TM distance: > 3 FB   Neck ROM: full   Mouth opening: > 3 cm    Respiratory Devices and Support         Dental           Cardiovascular   cardiovascular exam normal       Rhythm and rate: regular and normal     Pulmonary   pulmonary exam normal        breath sounds clear to auscultation           OUTSIDE LABS:  CBC:   Lab Results   Component Value Date    WBC 6.0 04/05/2024    WBC 10.7 01/02/2024    HGB 15.4 04/05/2024    HGB 15.1 01/02/2024    HCT 45.3 04/05/2024    HCT 44.9 01/02/2024     04/05/2024     01/02/2024     BMP:   Lab Results   Component Value Date     04/05/2024     01/02/2024    POTASSIUM 4.2 04/05/2024    POTASSIUM 4.6 01/02/2024    CHLORIDE 105 04/05/2024    CHLORIDE 103 01/02/2024    CO2 28 04/05/2024    CO2 25 01/02/2024    BUN 19.9 04/05/2024    BUN 14.4 01/02/2024    CR 1.14 04/05/2024    CR 1.08 01/02/2024     (H) 04/05/2024     (H) 01/15/2024     COAGS: No results found for: \"PTT\", \"INR\", \"FIBR\"  POC: No results found for: \"BGM\", \"HCG\", \"HCGS\"  HEPATIC:   Lab Results   Component Value Date    ALBUMIN 4.5 04/05/2024    PROTTOTAL 7.4 04/05/2024    ALT 28 04/05/2024    AST 24 04/05/2024    ALKPHOS 51 04/05/2024    BILITOTAL 0.6 " 04/05/2024     OTHER:   Lab Results   Component Value Date    A1C 5.6 04/05/2024    MARYANNE 9.1 04/05/2024    TSH 0.60 04/05/2024    SED 7 03/28/2023       Anesthesia Plan    ASA Status:  2    NPO Status:  NPO Appropriate    Anesthesia Type: MAC.              Consents    Anesthesia Plan(s) and associated risks, benefits, and realistic alternatives discussed. Questions answered and patient/representative(s) expressed understanding.     - Discussed: Risks, Benefits and Alternatives for BOTH SEDATION and the PROCEDURE were discussed     - Discussed with:  Patient       - Patient is DNR/DNI Status: No     Use of blood products discussed: No .     Postoperative Care            Comments:               CICI Saavedra CRNA    I have reviewed the pertinent notes and labs in the chart from the past 30 days and (re)examined the patient.  Any updates or changes from those notes are reflected in this note.

## 2024-09-27 ENCOUNTER — HOSPITAL ENCOUNTER (OUTPATIENT)
Facility: CLINIC | Age: 59
Discharge: HOME | End: 2024-09-27
Attending: OPHTHALMOLOGY | Admitting: OPHTHALMOLOGY
Payer: COMMERCIAL

## 2024-09-27 ENCOUNTER — ANESTHESIA (OUTPATIENT)
Dept: SURGERY | Facility: CLINIC | Age: 59
End: 2024-09-27
Payer: COMMERCIAL

## 2024-09-27 VITALS
HEIGHT: 70 IN | TEMPERATURE: 97.8 F | WEIGHT: 276 LBS | DIASTOLIC BLOOD PRESSURE: 94 MMHG | SYSTOLIC BLOOD PRESSURE: 149 MMHG | OXYGEN SATURATION: 99 % | BODY MASS INDEX: 39.51 KG/M2 | RESPIRATION RATE: 16 BRPM

## 2024-09-27 PROCEDURE — 250N000009 HC RX 250: Performed by: NURSE ANESTHETIST, CERTIFIED REGISTERED

## 2024-09-27 PROCEDURE — 250N000009 HC RX 250: Performed by: OPHTHALMOLOGY

## 2024-09-27 PROCEDURE — 82962 GLUCOSE BLOOD TEST: CPT

## 2024-09-27 PROCEDURE — 258N000003 HC RX IP 258 OP 636: Performed by: NURSE ANESTHETIST, CERTIFIED REGISTERED

## 2024-09-27 PROCEDURE — V2632 POST CHMBR INTRAOCULAR LENS: HCPCS | Performed by: OPHTHALMOLOGY

## 2024-09-27 PROCEDURE — 272N000001 HC OR GENERAL SUPPLY STERILE: Performed by: OPHTHALMOLOGY

## 2024-09-27 PROCEDURE — 250N000011 HC RX IP 250 OP 636: Performed by: NURSE ANESTHETIST, CERTIFIED REGISTERED

## 2024-09-27 PROCEDURE — 360N000007 HC CATARACT SURGICAL PACKAGE: Performed by: OPHTHALMOLOGY

## 2024-09-27 PROCEDURE — 761N000008 HC RECOVERY CATRACT PACKAGE: Performed by: OPHTHALMOLOGY

## 2024-09-27 PROCEDURE — 370N000004 HC ANESTHESIA CATARACT PACKAGE: Performed by: OPHTHALMOLOGY

## 2024-09-27 PROCEDURE — 250N000011 HC RX IP 250 OP 636: Performed by: OPHTHALMOLOGY

## 2024-09-27 RX ORDER — SODIUM CHLORIDE, SODIUM LACTATE, POTASSIUM CHLORIDE, CALCIUM CHLORIDE 600; 310; 30; 20 MG/100ML; MG/100ML; MG/100ML; MG/100ML
INJECTION, SOLUTION INTRAVENOUS CONTINUOUS
Status: DISCONTINUED | OUTPATIENT
Start: 2024-09-27 | End: 2024-09-27 | Stop reason: HOSPADM

## 2024-09-27 RX ORDER — LIDOCAINE 40 MG/G
CREAM TOPICAL
Status: DISCONTINUED | OUTPATIENT
Start: 2024-09-27 | End: 2024-09-27 | Stop reason: HOSPADM

## 2024-09-27 RX ORDER — PROPARACAINE HYDROCHLORIDE 5 MG/ML
SOLUTION/ DROPS OPHTHALMIC PRN
Status: DISCONTINUED | OUTPATIENT
Start: 2024-09-27 | End: 2024-09-27 | Stop reason: HOSPADM

## 2024-09-27 RX ORDER — ONDANSETRON 2 MG/ML
4 INJECTION INTRAMUSCULAR; INTRAVENOUS EVERY 30 MIN PRN
Status: DISCONTINUED | OUTPATIENT
Start: 2024-09-27 | End: 2024-09-27 | Stop reason: HOSPADM

## 2024-09-27 RX ORDER — NALOXONE HYDROCHLORIDE 0.4 MG/ML
0.1 INJECTION, SOLUTION INTRAMUSCULAR; INTRAVENOUS; SUBCUTANEOUS
Status: DISCONTINUED | OUTPATIENT
Start: 2024-09-27 | End: 2024-09-27 | Stop reason: HOSPADM

## 2024-09-27 RX ORDER — TROPICAMIDE 10 MG/ML
1 SOLUTION/ DROPS OPHTHALMIC
Status: COMPLETED | OUTPATIENT
Start: 2024-09-27 | End: 2024-09-27

## 2024-09-27 RX ORDER — BALANCED SALT SOLUTION 6.4; .75; .48; .3; 3.9; 1.7 MG/ML; MG/ML; MG/ML; MG/ML; MG/ML; MG/ML
SOLUTION OPHTHALMIC PRN
Status: DISCONTINUED | OUTPATIENT
Start: 2024-09-27 | End: 2024-09-27 | Stop reason: HOSPADM

## 2024-09-27 RX ORDER — ONDANSETRON 4 MG/1
4 TABLET, ORALLY DISINTEGRATING ORAL EVERY 30 MIN PRN
Status: DISCONTINUED | OUTPATIENT
Start: 2024-09-27 | End: 2024-09-27 | Stop reason: HOSPADM

## 2024-09-27 RX ORDER — LIDOCAINE HYDROCHLORIDE 20 MG/ML
JELLY TOPICAL PRN
Status: DISCONTINUED | OUTPATIENT
Start: 2024-09-27 | End: 2024-09-27 | Stop reason: HOSPADM

## 2024-09-27 RX ORDER — DEXAMETHASONE SODIUM PHOSPHATE 4 MG/ML
4 INJECTION, SOLUTION INTRA-ARTICULAR; INTRALESIONAL; INTRAMUSCULAR; INTRAVENOUS; SOFT TISSUE
Status: DISCONTINUED | OUTPATIENT
Start: 2024-09-27 | End: 2024-09-27 | Stop reason: HOSPADM

## 2024-09-27 RX ADMIN — CYCLOPENTOLATE HYDROCHLORIDE AND PHENYLEPHRINE HYDROCHLORIDE 1 DROP: 2; 10 SOLUTION/ DROPS OPHTHALMIC at 07:07

## 2024-09-27 RX ADMIN — TROPICAMIDE 1 DROP: 10 SOLUTION/ DROPS OPHTHALMIC at 07:07

## 2024-09-27 RX ADMIN — LIDOCAINE HYDROCHLORIDE 0.2 ML: 10 INJECTION, SOLUTION EPIDURAL; INFILTRATION; INTRACAUDAL; PERINEURAL at 07:08

## 2024-09-27 RX ADMIN — SODIUM CHLORIDE, POTASSIUM CHLORIDE, SODIUM LACTATE AND CALCIUM CHLORIDE: 600; 310; 30; 20 INJECTION, SOLUTION INTRAVENOUS at 07:07

## 2024-09-27 RX ADMIN — MIDAZOLAM 2 MG: 1 INJECTION INTRAMUSCULAR; INTRAVENOUS at 07:59

## 2024-09-27 RX ADMIN — CYCLOPENTOLATE HYDROCHLORIDE AND PHENYLEPHRINE HYDROCHLORIDE 1 DROP: 2; 10 SOLUTION/ DROPS OPHTHALMIC at 07:01

## 2024-09-27 RX ADMIN — TROPICAMIDE 1 DROP: 10 SOLUTION/ DROPS OPHTHALMIC at 07:01

## 2024-09-27 RX ADMIN — CYCLOPENTOLATE HYDROCHLORIDE AND PHENYLEPHRINE HYDROCHLORIDE 1 DROP: 2; 10 SOLUTION/ DROPS OPHTHALMIC at 06:55

## 2024-09-27 RX ADMIN — TROPICAMIDE 1 DROP: 10 SOLUTION/ DROPS OPHTHALMIC at 06:55

## 2024-09-27 ASSESSMENT — ACTIVITIES OF DAILY LIVING (ADL)
ADLS_ACUITY_SCORE: 29
ADLS_ACUITY_SCORE: 29

## 2024-09-27 NOTE — ANESTHESIA CARE TRANSFER NOTE
Patient: Juanito Delacruz    Procedure: Procedure(s):  Cataract extraction with intraocular lens placement right       Diagnosis: Nuclear sclerosis of right eye [H25.11]  Diagnosis Additional Information: No value filed.    Anesthesia Type:   MAC     Note:    Oropharynx: oropharynx clear of all foreign objects  Level of Consciousness: awake  Oxygen Supplementation: room air    Independent Airway: airway patency satisfactory and stable  Dentition: dentition unchanged  Vital Signs Stable: post-procedure vital signs reviewed and stable  Report to RN Given: handoff report given  Patient transferred to: Phase II    Handoff Report: Identifed the Patient, Identified the Reponsible Provider, Reviewed the pertinent medical history, Discussed the surgical course, Reviewed Intra-OP anesthesia mangement and issues during anesthesia, Set expectations for post-procedure period and Allowed opportunity for questions and acknowledgement of understanding      Vitals:  Vitals Value Taken Time   /88 09/27/24 0824   Temp     Pulse 75 09/27/24 0824   Resp     SpO2 97 % 09/27/24 0825   Vitals shown include unfiled device data.    Electronically Signed By: CICI Saavedra CRNA  September 27, 2024  8:27 AM

## 2024-09-27 NOTE — OP NOTE
OPHTHALMOLOGY OPERATIVE NOTE    PATIENT: Juanito Delacruz  DATE OF SURGERY: 9/27/2024  PREOPERATIVE DIAGNOSIS:  Senile Nuclear Cataract, Right eye  POSTOPERATIVE DIAGNOSIS:  Senile Nuclear Cataract, Right eye  OPERATIVE PROCEDURE:  Phacoemulsification with placement of intraocular lens  SURGEON:  Nolan Joseph MD  ANESTHESIA:  Topical / MAC  EBL:  None  SPECIMENS:  None  COMPLICATIONS:  None    PROCEDURE:  The patient was brought to the operating room at [facility].  The right eye was prepped and draped in the usual fashion for cataract surgery.  A wire lid speculum was inserted.  A super sharp blade was used to make a paracentesis at the 11 O'clock position.  The super sharp blade was used to make a partial thickness temporal groove, which was 3 mm in length.  0.8 mL of non-preserved epi-Shugarcaine was injected into the anterior chamber.  Viscoelastic was used to inflate the anterior chamber through a cannula.  A 2.5 mm microkeratome was used to make a temporal clear corneal incision in a two-plane fashion.  A cystotome needle and forceps were used to make a capsulorrhexis.  Hydrodissection and hydrodelineation were performed with Balance Salt Solution.  The lens was then phacoemulsified and removed without complications.  The cortical material was removed with bimanual irrigation and aspiration.  The capsular bag was filled with viscoelastic.  A posterior chamber intraocular lens, preselected and recorded, was folded and inserted into the capsular bag.  The viscoelastic was removed with the irrigation and aspiration tip.  Balanced Salt Solution with Vigamox, 150mg/0.1mL, was used to refill the anterior chamber.  The wounds were checked for water tightness and required no suture.  The wire lid speculum was removed.  The patient's right eye was cleaned and a drop of each post-operative drop was placed, followed by a ross shield.  The patient tolerated the procedure well, and there  were no complications.      Nolan Joseph MD    electronic

## 2024-09-27 NOTE — ANESTHESIA POSTPROCEDURE EVALUATION
Patient: Juanito Delacruz    Procedure: Procedure(s):  Cataract extraction with intraocular lens placement right       Anesthesia Type:  MAC    Note:  Disposition: Outpatient   Postop Pain Control: Uneventful            Sign Out: Well controlled pain   PONV: No   Neuro/Psych: Uneventful            Sign Out: Acceptable/Baseline neuro status   Airway/Respiratory: Uneventful            Sign Out: Acceptable/Baseline resp. status   CV/Hemodynamics: Uneventful            Sign Out: Acceptable CV status; No obvious hypovolemia; No obvious fluid overload   Other NRE: NONE   DID A NON-ROUTINE EVENT OCCUR? No           Last vitals:  Vitals Value Taken Time   /94 09/27/24 0833   Temp 36.6  C (97.8  F) 09/27/24 0824   Pulse 74 09/27/24 0833   Resp 16 09/27/24 0831   SpO2 99 % 09/27/24 0831   Vitals shown include unfiled device data.    Electronically Signed By: CICI Saavedra CRNA  September 27, 2024  8:54 AM

## 2024-09-30 LAB — GLUCOSE BLDC GLUCOMTR-MCNC: 106 MG/DL (ref 70–99)

## 2024-10-24 ENCOUNTER — ANESTHESIA EVENT (OUTPATIENT)
Dept: SURGERY | Facility: CLINIC | Age: 59
End: 2024-10-24
Payer: COMMERCIAL

## 2024-10-24 NOTE — ANESTHESIA PREPROCEDURE EVALUATION
Anesthesia Pre-Procedure Evaluation    Patient: Juanito Delacruz   MRN: [id number] : 1965        Procedure : Procedure(s):  Cataract extraction with intraocular lens placement          Past Medical History:   Diagnosis Date    Cervicalgia     Non-specific Cervical Spine Pain    Diabetes mellitus, type 2 (H)     Diabetic ketoacidosis without coma associated with type 2 diabetes mellitus (H)     Fatty liver     History of colonic polyps     Kidney stone     Lumbago     Mechanical Low Back Pain    Morbid obesity (H)     Motion sickness     Muscular wasting and disuse atrophy, not elsewhere classified     Deconditioning Syndrome    Parotid nodule     Sleep apnea     cpap    Unspecified chronic suppurative otitis media     Ureteral stone       Past Surgical History:   Procedure Laterality Date    CYSTOURETEROSCOPY, WITH LITHOTRIPSY USING LETY 120P LASER AND URETERAL STENT INSERTION Right 2023    Procedure: CYSTOSCOPY, RIGHT RETROGRADE PYELOGRAM, RIGHT URETEROSCOPY WITH LASER STANDBY, RIGHT URETERAL STENT PLACEMENT;  Surgeon: Reginaldo Pritchett MD;  Location: [facility] OR    CYSTOURETEROSCOPY, WITH LITHOTRIPSY USING LETY 120P LASER AND URETERAL STENT INSERTION Right 01/15/2024    Procedure: CYSTOSCOPY RIGHT RETROGRADE PYELOGRAM, RIGHT URETEROSCOPY WITH LASER LITHOTRIPSY, RIGHT URETERAL STENT EXCHANGE;  Surgeon: Reginaldo Pritchett MD;  Location: [facility] OR    PAROTIDECTOMY Right 2023    Procedure: RIGHT SUPERFICIAL PAROTIDECTOMY;  Surgeon: Peggy Reese MD;  Location: [facility] OR    PHACOEMULSIFICATION WITH STANDARD INTRAOCULAR LENS IMPLANT Right 2024    Procedure: Cataract extraction with intraocular lens placement right;  Surgeon: Nolan Joseph MD;  Location: [facility] OR      No Known Allergies   Social History     Tobacco Use    Smoking status: Never    Smokeless tobacco: Never   Substance Use Topics    Alcohol use: Yes     Comment: Alcoholic Drinks/day: rare      Wt  "Readings from Last 1 Encounters:   09/27/24 125.2 kg (276 lb)        Anesthesia Evaluation   Pt has had prior anesthetic. Type: General and MAC.    History of anesthetic complications  - motion sickness.      ROS/MED HX  ENT/Pulmonary:     (+) sleep apnea, uses CPAP,                                      Neurologic:  - neg neurologic ROS     Cardiovascular:  - neg cardiovascular ROS     METS/Exercise Tolerance:     Hematologic:  - neg hematologic  ROS     Musculoskeletal:  - neg musculoskeletal ROS (+)  arthritis,             GI/Hepatic: Comment: Fatty liver    (+)             liver disease,       Renal/Genitourinary:     (+) renal disease, type: CRI,   Pt has history of transplant,         Endo:     (+) type I DM, type II DM,             Obesity,       Psychiatric/Substance Use:  - neg psychiatric ROS     Infectious Disease:  - neg infectious disease ROS     Malignancy:  - neg malignancy ROS     Other:  - neg other ROS          Physical Exam    Airway  airway exam normal           Respiratory Devices and Support         Dental       (+) Modest Abnormalities - crowns, retainers, 1 or 2 missing teeth      Cardiovascular   cardiovascular exam normal          Pulmonary   pulmonary exam normal                OUTSIDE LABS:  CBC:   Lab Results   Component Value Date    WBC 6.0 04/05/2024    WBC 10.7 01/02/2024    HGB 15.4 04/05/2024    HGB 15.1 01/02/2024    HCT 45.3 04/05/2024    HCT 44.9 01/02/2024     04/05/2024     01/02/2024     BMP:   Lab Results   Component Value Date     04/05/2024     01/02/2024    POTASSIUM 4.2 04/05/2024    POTASSIUM 4.6 01/02/2024    CHLORIDE 105 04/05/2024    CHLORIDE 103 01/02/2024    CO2 28 04/05/2024    CO2 25 01/02/2024    BUN 19.9 04/05/2024    BUN 14.4 01/02/2024    CR 1.14 04/05/2024    CR 1.08 01/02/2024     (H) 09/27/2024     (H) 04/05/2024     COAGS: No results found for: \"PTT\", \"INR\", \"FIBR\"  POC: No results found for: \"BGM\", \"HCG\", " "\"HCGS\"  HEPATIC:   Lab Results   Component Value Date    ALBUMIN 4.5 04/05/2024    PROTTOTAL 7.4 04/05/2024    ALT 28 04/05/2024    AST 24 04/05/2024    ALKPHOS 51 04/05/2024    BILITOTAL 0.6 04/05/2024     OTHER:   Lab Results   Component Value Date    A1C 5.6 04/05/2024    MARYANNE 9.1 04/05/2024    TSH 0.60 04/05/2024    SED 7 03/28/2023       Anesthesia Plan    ASA Status:  3    NPO Status:  NPO Appropriate    Anesthesia Type: MAC.      Maintenance: Balanced.        Consents    Anesthesia Plan(s) and associated risks, benefits, and realistic alternatives discussed. Questions answered and patient/representative(s) expressed understanding.     - Discussed:     - Discussed with:  Patient            Postoperative Care            Comments:               CICI Alford CRNA    I have reviewed the pertinent notes and labs in the chart from the past 30 days and (re)examined the patient.  Any updates or changes from those notes are reflected in this note.                       # Obesity: Estimated body mass index is 39.6 kg/m  as calculated from the following:    Height as of 9/27/24: 1.778 m (5' 10\").    Weight as of 9/27/24: 125.2 kg (276 lb).             "

## 2024-10-25 ENCOUNTER — HOSPITAL ENCOUNTER (OUTPATIENT)
Facility: CLINIC | Age: 59
Discharge: HOME | End: 2024-10-25
Attending: OPHTHALMOLOGY | Admitting: OPHTHALMOLOGY
Payer: COMMERCIAL

## 2024-10-25 ENCOUNTER — ANESTHESIA (OUTPATIENT)
Dept: SURGERY | Facility: CLINIC | Age: 59
End: 2024-10-25
Payer: COMMERCIAL

## 2024-10-25 VITALS
SYSTOLIC BLOOD PRESSURE: 141 MMHG | TEMPERATURE: 98.4 F | RESPIRATION RATE: 16 BRPM | HEART RATE: 69 BPM | OXYGEN SATURATION: 97 % | DIASTOLIC BLOOD PRESSURE: 92 MMHG

## 2024-10-25 LAB — GLUCOSE BLDC GLUCOMTR-MCNC: 100 MG/DL (ref 70–99)

## 2024-10-25 PROCEDURE — 360N000007 HC CATARACT SURGICAL PACKAGE: Performed by: OPHTHALMOLOGY

## 2024-10-25 PROCEDURE — 82962 GLUCOSE BLOOD TEST: CPT

## 2024-10-25 PROCEDURE — 250N000009 HC RX 250: Performed by: OPHTHALMOLOGY

## 2024-10-25 PROCEDURE — 761N000008 HC RECOVERY CATRACT PACKAGE: Performed by: OPHTHALMOLOGY

## 2024-10-25 PROCEDURE — V2632 POST CHMBR INTRAOCULAR LENS: HCPCS | Performed by: OPHTHALMOLOGY

## 2024-10-25 PROCEDURE — 272N000001 HC OR GENERAL SUPPLY STERILE: Performed by: OPHTHALMOLOGY

## 2024-10-25 PROCEDURE — 370N000004 HC ANESTHESIA CATARACT PACKAGE: Performed by: OPHTHALMOLOGY

## 2024-10-25 PROCEDURE — 250N000011 HC RX IP 250 OP 636: Performed by: NURSE ANESTHETIST, CERTIFIED REGISTERED

## 2024-10-25 PROCEDURE — 250N000011 HC RX IP 250 OP 636: Mod: JZ | Performed by: OPHTHALMOLOGY

## 2024-10-25 RX ORDER — PROPARACAINE HYDROCHLORIDE 5 MG/ML
SOLUTION/ DROPS OPHTHALMIC PRN
Status: DISCONTINUED | OUTPATIENT
Start: 2024-10-25 | End: 2024-10-25 | Stop reason: HOSPADM

## 2024-10-25 RX ORDER — LIDOCAINE HYDROCHLORIDE 20 MG/ML
JELLY TOPICAL PRN
Status: DISCONTINUED | OUTPATIENT
Start: 2024-10-25 | End: 2024-10-25 | Stop reason: HOSPADM

## 2024-10-25 RX ORDER — BALANCED SALT SOLUTION 6.4; .75; .48; .3; 3.9; 1.7 MG/ML; MG/ML; MG/ML; MG/ML; MG/ML; MG/ML
SOLUTION OPHTHALMIC PRN
Status: DISCONTINUED | OUTPATIENT
Start: 2024-10-25 | End: 2024-10-25 | Stop reason: HOSPADM

## 2024-10-25 RX ORDER — SODIUM CHLORIDE, SODIUM LACTATE, POTASSIUM CHLORIDE, CALCIUM CHLORIDE 600; 310; 30; 20 MG/100ML; MG/100ML; MG/100ML; MG/100ML
INJECTION, SOLUTION INTRAVENOUS CONTINUOUS
Status: DISCONTINUED | OUTPATIENT
Start: 2024-10-25 | End: 2024-10-25 | Stop reason: HOSPADM

## 2024-10-25 RX ORDER — TROPICAMIDE 10 MG/ML
1 SOLUTION/ DROPS OPHTHALMIC
Status: COMPLETED | OUTPATIENT
Start: 2024-10-25 | End: 2024-10-25

## 2024-10-25 RX ORDER — POVIDONE-IODINE 5 %
SOLUTION, NON-ORAL OPHTHALMIC (EYE) PRN
Status: DISCONTINUED | OUTPATIENT
Start: 2024-10-25 | End: 2024-10-25 | Stop reason: HOSPADM

## 2024-10-25 RX ORDER — LIDOCAINE 40 MG/G
CREAM TOPICAL
Status: DISCONTINUED | OUTPATIENT
Start: 2024-10-25 | End: 2024-10-25 | Stop reason: HOSPADM

## 2024-10-25 RX ADMIN — CYCLOPENTOLATE HYDROCHLORIDE AND PHENYLEPHRINE HYDROCHLORIDE 1 DROP: 2; 10 SOLUTION/ DROPS OPHTHALMIC at 08:13

## 2024-10-25 RX ADMIN — TROPICAMIDE 1 DROP: 10 SOLUTION/ DROPS OPHTHALMIC at 08:27

## 2024-10-25 RX ADMIN — MIDAZOLAM 2 MG: 1 INJECTION INTRAMUSCULAR; INTRAVENOUS at 09:04

## 2024-10-25 RX ADMIN — CYCLOPENTOLATE HYDROCHLORIDE AND PHENYLEPHRINE HYDROCHLORIDE 1 DROP: 2; 10 SOLUTION/ DROPS OPHTHALMIC at 08:19

## 2024-10-25 RX ADMIN — MIDAZOLAM 2 MG: 1 INJECTION INTRAMUSCULAR; INTRAVENOUS at 08:57

## 2024-10-25 RX ADMIN — CYCLOPENTOLATE HYDROCHLORIDE AND PHENYLEPHRINE HYDROCHLORIDE 1 DROP: 2; 10 SOLUTION/ DROPS OPHTHALMIC at 08:26

## 2024-10-25 RX ADMIN — TROPICAMIDE 1 DROP: 10 SOLUTION/ DROPS OPHTHALMIC at 08:19

## 2024-10-25 RX ADMIN — TROPICAMIDE 1 DROP: 10 SOLUTION/ DROPS OPHTHALMIC at 08:14

## 2024-10-25 ASSESSMENT — ACTIVITIES OF DAILY LIVING (ADL)
ADLS_ACUITY_SCORE: 0
ADLS_ACUITY_SCORE: 0

## 2024-10-25 NOTE — OP NOTE
CATARACT OPERATIVE NOTE    PATIENT: Juanito Delacruz  DATE OF SURGERY: 10/25/2024  PREOPERATIVE DIAGNOSIS:  Senile Nuclear Cataract, Left eye  POSTOPERATIVE DIAGNOSIS:  Senile Nuclear Cataract, Left eye  OPERATIVE PROCEDURE:  Phacoemulsification and placement of intraocular lens  SURGEON:  Nolan Joseph MD  ANESTHESIA:  Topical / MAC  EBL:  None  SPECIMENS:  None  COMPLICATIONS:  None    PROCEDURE:  The patient was brought to the operating room at [facility].  The left eye was prepped and draped in the usual fashion for cataract surgery.  A wire lid speculum was inserted.  A super sharp blade was used to make a paracentesis at the 5 O'clock position.  The super sharp blade was used to make a partial thickness temporal groove, which was 3 mm in length.  0.8 mL of non-preserved epi-Shugarcaine was injected into the anterior chamber.  Viscoelastic was used to inflate the anterior chamber through a cannula.  A 2.5 mm microkeratome was used to make a temporal clear corneal incision in a two-plane fashion.  A cystotome needle and forceps were used to make a capsulorrhexis.  Hydrodissection and hydrodelineation were performed with Balance Salt Solution.  The lens was then phacoemulsified and removed without complications.  The cortical material was removed with bimanual irrigation and aspiration.  The capsular bag was filled with viscoelastic.  A posterior chamber intraocular lens, preselected and recorded, was folded and inserted into the capsular bag.  The viscoelastic was removed with the irrigation and aspiration tip.  Balanced Salt Solution with Vigamox, 150mg/0.1mL, was used to refill the anterior chamber.  The wounds were checked for water tightness and required no suture.  The wire lid speculum was removed.  The patient's left eye was cleaned and a drop of each post-operative drop was placed, followed by a ross shield.  The patient tolerated the procedure well, and there were no  complications.      Nolan Joseph MD

## 2024-10-25 NOTE — ANESTHESIA POSTPROCEDURE EVALUATION
Patient: Juanito Delacruz    Procedure: Procedure(s):  Cataract extraction with intraocular lens placement left       Anesthesia Type:  MAC    Note:  Disposition: Outpatient   Postop Pain Control: Uneventful            Sign Out: Well controlled pain   PONV: No   Neuro/Psych: Uneventful            Sign Out: Acceptable/Baseline neuro status   Airway/Respiratory: Uneventful            Sign Out: Acceptable/Baseline resp. status   CV/Hemodynamics: Uneventful            Sign Out: Acceptable CV status; No obvious hypovolemia; No obvious fluid overload   Other NRE: NONE   DID A NON-ROUTINE EVENT OCCUR? No           Last vitals:  Vitals Value Taken Time   BP     Temp     Pulse     Resp     SpO2 97 % 10/25/24 0934   Vitals shown include unfiled device data.    Electronically Signed By: CICI Diop CRNA  October 25, 2024  9:35 AM

## 2024-10-25 NOTE — ANESTHESIA CARE TRANSFER NOTE
Patient: Juanito Delacruz    Procedure: Procedure(s):  Cataract extraction with intraocular lens placement left       Diagnosis: Nuclear sclerosis of left eye [H25.12]  Diagnosis Additional Information: No value filed.    Anesthesia Type:   MAC     Note:    Oropharynx: oropharynx clear of all foreign objects and spontaneously breathing  Level of Consciousness: awake and drowsy  Oxygen Supplementation: room air    Independent Airway: airway patency satisfactory and stable  Dentition: dentition unchanged  Vital Signs Stable: post-procedure vital signs reviewed and stable  Report to RN Given: handoff report given  Patient transferred to: Phase II    Handoff Report: Identifed the Patient, Identified the Reponsible Provider, Reviewed the pertinent medical history, Discussed the surgical course, Reviewed Intra-OP anesthesia mangement and issues during anesthesia, Set expectations for post-procedure period and Allowed opportunity for questions and acknowledgement of understanding      Vitals:  Vitals Value Taken Time   BP     Temp     Pulse     Resp     SpO2 97 % 10/25/24 0934   Vitals shown include unfiled device data.    Electronically Signed By: CICI Diop CRNA  October 25, 2024  9:35 AM

## 2024-10-25 NOTE — PROVIDER NOTIFICATION
10/25/24 1634   Patient Contact Information    Contact (Name and Relationship) - Surgery/Procedure juan, spouse   Discharge Checklist   Accompanied by (name/relationship) juan, spouse   Post Op Call Approval/Exceptions? Yes   Belongings returned to patient Yes   Is this contact the Transport/Escort and Responsible Adult at home ? (REQUIRED FOR SDS PATIENTS) Yes   Valuables   Patient Belongings remains with patient   Patient Belongings Remaining with Patient clothing   Did you bring any home meds/supplements to the hospital?  No     /82   Temp 98.4  F (36.9  C) (Oral)   Resp 16   SpO2 97%   Pt transitions through P2 without any concerns. VSS. Pt tolerated snack. Pt denies pain. Discharge paperwork reviewed with patient and spouse, they voice understanding of this. Pt discharged with clothing.

## 2024-11-13 DIAGNOSIS — I10 BENIGN ESSENTIAL HYPERTENSION: ICD-10-CM

## 2024-11-13 RX ORDER — LOSARTAN POTASSIUM 25 MG/1
25 TABLET ORAL DAILY
Qty: 90 TABLET | Refills: 1 | Status: SHIPPED | OUTPATIENT
Start: 2024-11-13

## 2024-11-13 NOTE — TELEPHONE ENCOUNTER
Refill request for Losartan.   BP Readings from Last 3 Encounters:   10/25/24 (!) 141/92   09/27/24 (!) 149/94   06/04/24 (!) 144/90      Provider approval needed.

## 2024-12-16 ENCOUNTER — TELEPHONE (OUTPATIENT)
Dept: ENDOCRINOLOGY | Facility: CLINIC | Age: 59
End: 2024-12-16
Payer: COMMERCIAL

## 2024-12-16 ENCOUNTER — MYC MEDICAL ADVICE (OUTPATIENT)
Dept: ENDOCRINOLOGY | Facility: CLINIC | Age: 59
End: 2024-12-16
Payer: COMMERCIAL

## 2024-12-16 NOTE — TELEPHONE ENCOUNTER
[facility] Call Center    Phone Message    May a detailed message be left on voicemail: yes     Reason for Call: Other: patient has in person visit tomorrow at 3 with Barbara Jimenes in [place] and wondering if he can do a virtual visit due to work is swamped and they are working short. If not, he'll try to get out early and come. Next appt is March and he doesn't want to wait that long. Please call and advise ASAP so he knows what to do with work.       If you want him to do lab orders, please place them and he can get them done in the morning at [facility] so you can have results at visit.

## 2024-12-17 ENCOUNTER — VIRTUAL VISIT (OUTPATIENT)
Dept: ENDOCRINOLOGY | Facility: CLINIC | Age: 59
End: 2024-12-17
Payer: COMMERCIAL

## 2024-12-17 DIAGNOSIS — I10 BENIGN ESSENTIAL HYPERTENSION: ICD-10-CM

## 2024-12-17 DIAGNOSIS — E11.9 TYPE 2 DIABETES MELLITUS WITHOUT COMPLICATION, WITHOUT LONG-TERM CURRENT USE OF INSULIN (H): Primary | ICD-10-CM

## 2024-12-17 DIAGNOSIS — R79.89 LOW TESTOSTERONE: ICD-10-CM

## 2024-12-17 PROCEDURE — 99213 OFFICE O/P EST LOW 20 MIN: CPT | Mod: 95 | Performed by: PHYSICIAN ASSISTANT

## 2024-12-17 NOTE — PROGRESS NOTES
Assessment/Plan :   Type 2 DM. Ken is doing well on the weekly Ozempic. He continues to take 2 mg weekly and he feels like things are stable. He has not been monitoring his blood sugars, daily. He never put the sensor on but he has been considering it. We reviewed the importance of tight glucose monitoring and I would like him to try the Gio sensor. This would help him to better connect some of his ongoing symptoms to possible blood sugar fluctuations. He will look for the sensor at home and try wearing it for 14 days. We will plan on a follow-up appt in 6 mos.   Hypogonadism. Ken feels like the testosterone injections are helping with his overall energy but this benefit seems to wear off after about 4 or 5 days. We reviewed how testosterone is absorbed. He is also due for repeat laboratory testing. We may either increase the dosing or we could also shorten the time between injections. He is not having any trouble administering the injections and he would not be opposed to increasing the frequency. I will recommend an adjustment based on the results. We will follow-up in 6 mos.    Due to the COVID 19 pandemic this visit was a telephone/video visit in order to help prevent spread of infection in this patient and the general population. The patient gave verbal consent for the visit today. I have independently reviewed and interpreted labs, imaging as indicated.       Distant Location (provider location):  On-site  Mode of Communication:  Video Conference via [employer]  Chart review/prep time 5    Patient visit on December 17, 2024   Started at 3:02 PM   Ended at 3:17 PM   Total length of 15m2s  Visit was conducted over [employer] . Patient was informed of the benefits and limitations of telemedicine. Patient consented to real time communication over audio, video, and/or data.  Signed, TERESA Owen    24 minutes spent on the date of the encounter doing chart review, history and exam, documentation and  further activities as noted above.      Chief complaint:  Juanito is a 59 year old male who returns for follow-up of Type 2 DM and hypogonadism.    I have reviewed Care Everywhere including [facility], [facility], [facility], [facility], [facility], [facility] , [facility], [place] lab reports, imaging reports and provider notes as indicated.      HISTORY OF PRESENT ILLNESS  Ken is doing okay. He has noticed some improvements in his overall energy since starting the testosterone injections. He feels like his energy does start to decline after about 4 or 5 days. He is not sure if this is because the dose needs to be adjusted or if it is just due to stress. His Dad had some recent health problems and they decided to move him to an assisted living facility. He is not happy with this and Ken feels like their relationship has been a bit strained, which is very stressful. However, all in all, he is doing better.    Ken has also continued to take Ozempic 2 mg weekly to manage his blood sugars and help with weight loss. He feels like this has worked well. He has not been monitoring his blood sugars consistently but he also has not had any symptoms of high or low blood sugars. Ken also has not had any issues with abdominal bloating or pain. He also has not had any problems with blurred vision. His vision has actually improved due to a recent cataract procedure. Lastly, he has not had any problems with numbness/tingling in his feet.    Ken has been struggling with fatigue and weight gain for at least 3 yrs. He was previously diagnosed with pre-diabetes and then his A1C increased to the diabetic range. He has also been found to have low testosterone. We started him on topical testosterone replacement in June of 2023. He has not had any adverse effects from the topical therapy but he is also not sure that it is doing much. He also has a history of vitamin D deficiency and recurrent kidney stones.     Endocrine relevant  labs are as follows:   Latest Reference Range & Units 06/04/24 15:46   Testosterone Total 240 - 950 ng/dL 280      Latest Reference Range & Units 06/04/24 15:46   Free Testosterone Calculated ng/dL 5.38      Latest Reference Range & Units 06/04/24 15:46   Vitamin D, Total (25-Hydroxy) 20 - 50 ng/mL 23      Latest Reference Range & Units 04/05/24 08:58   Hemoglobin A1C 0.0 - 5.6 % 5.6      Latest Reference Range & Units 04/05/24 08:58   Vitamin D, Total (25-Hydroxy) 20 - 50 ng/mL 15 (L)   (L): Data is abnormally low    REVIEW OF SYSTEMS    Endocrine: positive for diabetes and low testosterone  Skin: negative  Eyes: positive for cataracts, negative for, visual blurring, redness, tearing  Ears/Nose/Throat: negative  Respiratory: No shortness of breath, dyspnea on exertion, cough, or hemoptysis  Cardiovascular: negative for, chest pain, dyspnea on exertion, lower extremity edema, and exercise intolerance  Gastrointestinal: negative for, nausea, vomiting, constipation, and diarrhea  Genitourinary: negative for, nocturia, dysuria, frequency, and urgency  Musculoskeletal: negative for, muscular weakness, nocturnal cramping, and foot pain  Neurologic: negative for, local weakness, numbness or tingling of hands, and numbness or tingling of feet  Psychiatric: positive for excessive stress and depression  Hematologic/Lymphatic/Immunologic: negative    Past Medical History  Past Medical History:   Diagnosis Date    Cervicalgia     Non-specific Cervical Spine Pain    Diabetes mellitus, type 2 (H)     Diabetic ketoacidosis without coma associated with type 2 diabetes mellitus (H)     Fatty liver     History of colonic polyps     Kidney stone     Lumbago     Mechanical Low Back Pain    Morbid obesity (H)     Motion sickness     Muscular wasting and disuse atrophy, not elsewhere classified     Deconditioning Syndrome    Parotid nodule     Sleep apnea     cpap    Unspecified chronic suppurative otitis media     Ureteral stone   "      Medications  Current Outpatient Medications   Medication Sig Dispense Refill    acetaminophen (TYLENOL) 500 MG tablet Take 2 tablets (1,000 mg) by mouth every 6 hours as needed for mild pain      albuterol (PROAIR HFA/PROVENTIL HFA/VENTOLIN HFA) 108 (90 Base) MCG/ACT inhaler Inhale 2 puffs into the lungs every 6 hours as needed for shortness of breath, wheezing or cough 18 g 2    losartan (COZAAR) 25 MG tablet Take 1 tablet (25 mg) by mouth daily. 90 tablet 1    Needle, Disp, (B-D HYPODERMIC NEEDLE) 23G X 1\" MISC 1 each every 14 days 50 each 0    rosuvastatin (CRESTOR) 5 MG tablet Take 1 tablet (5 mg) by mouth daily 90 tablet 4    Semaglutide, 2 MG/DOSE, (OZEMPIC) 8 MG/3ML pen Inject 2 mg Subcutaneous every 7 days 9 mL 3    syringe/needle, disp, 25G X 1\" 3 ML MISC 1 each every 14 days 50 each 0    Testosterone 1.62 % GEL Externally apply 4 Pump topically daily (81 mg) Apply from dispenser to clean, dry, intact skin of the upper arms and shoulders. 150 g 0    testosterone enanthate (DELATESTRYL) 200 MG/ML injection Inject into the muscle every 14 days Inj 0.5 ml IM every 14 days 5 mL 1    Testosterone Enanthate 75 MG/0.5ML SOAJ Inject 75 mg Subcutaneous every 7 days 2 mL 2       Allergies  No Known Allergies    Family History  family history includes Dementia in his father; Diabetes in his father; Neuropathy in his father; No Known Problems in his brother, daughter, sister, and son; Obesity in his brother; Pancreatic Cancer in his mother.    Social History  Social History     Tobacco Use    Smoking status: Never    Smokeless tobacco: Never   Vaping Use    Vaping status: Never Used   Substance Use Topics    Alcohol use: Yes     Comment: Alcoholic Drinks/day: rare    Drug use: Never       Physical Exam  There is no height or weight on file to calculate BMI.  GENERAL: no distress  SKIN: Visible skin clear. No significant rash, abnormal pigmentation or lesions.  EYES: Eyes grossly normal to inspection.  No " discharge or erythema, or obvious scleral/conjunctival abnormalities.  NECK: visible goiter is not present; no visible neck masses  RESP: No audible wheeze, cough, or visible cyanosis.  No visible retractions or increased work of breathing.    NEURO: Awake, alert, responds appropriately to questions.  Mentation and speech fluent.  PSYCH:affect normal and appearance well-groomed.      DATA REVIEW  He has not been monitoring his blood sugars

## 2024-12-17 NOTE — LETTER
12/17/2024      Juanito Delacruz  [id number] [facility] [place] [id number]      Dear Colleague,    Thank you for referring your patient, Juanito Delacruz, to the [facility]. Please see a copy of my visit note below.    Assessment/Plan :   Type 2 DM. Ken is doing well on the weekly Ozempic. He continues to take 2 mg weekly and he feels like things are stable. He has not been monitoring his blood sugars, daily. He never put the sensor on but he has been considering it. We reviewed the importance of tight glucose monitoring and I would like him to try the Gio sensor. This would help him to better connect some of his ongoing symptoms to possible blood sugar fluctuations. He will look for the sensor at home and try wearing it for 14 days. We will plan on a follow-up appt in 6 mos.   Hypogonadism. Ken feels like the testosterone injections are helping with his overall energy but this benefit seems to wear off after about 4 or 5 days. We reviewed how testosterone is absorbed. He is also due for repeat laboratory testing. We may either increase the dosing or we could also shorten the time between injections. He is not having any trouble administering the injections and he would not be opposed to increasing the frequency. I will recommend an adjustment based on the results. We will follow-up in 6 mos.    Due to the COVID 19 pandemic this visit was a telephone/video visit in order to help prevent spread of infection in this patient and the general population. The patient gave verbal consent for the visit today. I have independently reviewed and interpreted labs, imaging as indicated.       Distant Location (provider location):  On-site  Mode of Communication:  Video Conference via [facility]  Chart review/prep time 5    Patient visit on December 17, 2024    Started at 3:02 PM    Ended at 3:17 PM    Total length of 15m2s  Visit was conducted over [employer]. Patient was informed of the benefits and  limitations of telemedicine. Patient consented to real time communication over audio, video, and/or data.  Signed, TERESA Owen    24 minutes spent on the date of the encounter doing chart review, history and exam, documentation and further activities as noted above.      Chief complaint:  Juanito is a 59 year old male who returns for follow-up of Type 2 DM and hypogonadism.    I have reviewed Care Everywhere including [facility], [facility], [facility], [facility], [facility], [facility] , [facility], [place] lab reports, imaging reports and provider notes as indicated.      HISTORY OF PRESENT ILLNESS  Ken is doing okay. He has noticed some improvements in his overall energy since starting the testosterone injections. He feels like his energy does start to decline after about 4 or 5 days. He is not sure if this is because the dose needs to be adjusted or if it is just due to stress. His Dad had some recent health problems and they decided to move him to an assisted living facility. He is not happy with this and Ken feels like their relationship has been a bit strained, which is very stressful. However, all in all, he is doing better.    Ken has also continued to take Ozempic 2 mg weekly to manage his blood sugars and help with weight loss. He feels like this has worked well. He has not been monitoring his blood sugars consistently but he also has not had any symptoms of high or low blood sugars. Ken also has not had any issues with abdominal bloating or pain. He also has not had any problems with blurred vision. His vision has actually improved due to a recent cataract procedure. Lastly, he has not had any problems with numbness/tingling in his feet.    Ken has been struggling with fatigue and weight gain for at least 3 yrs. He was previously diagnosed with pre-diabetes and then his A1C increased to the diabetic range. He has also been found to have low testosterone. We started him on topical  testosterone replacement in June of 2023. He has not had any adverse effects from the topical therapy but he is also not sure that it is doing much. He also has a history of vitamin D deficiency and recurrent kidney stones.     Endocrine relevant labs are as follows:   Latest Reference Range & Units 06/04/24 15:46   Testosterone Total 240 - 950 ng/dL 280      Latest Reference Range & Units 06/04/24 15:46   Free Testosterone Calculated ng/dL 5.38      Latest Reference Range & Units 06/04/24 15:46   Vitamin D, Total (25-Hydroxy) 20 - 50 ng/mL 23      Latest Reference Range & Units 04/05/24 08:58   Hemoglobin A1C 0.0 - 5.6 % 5.6      Latest Reference Range & Units 04/05/24 08:58   Vitamin D, Total (25-Hydroxy) 20 - 50 ng/mL 15 (L)   (L): Data is abnormally low    REVIEW OF SYSTEMS    Endocrine: positive for diabetes and low testosterone  Skin: negative  Eyes: positive for cataracts, negative for, visual blurring, redness, tearing  Ears/Nose/Throat: negative  Respiratory: No shortness of breath, dyspnea on exertion, cough, or hemoptysis  Cardiovascular: negative for, chest pain, dyspnea on exertion, lower extremity edema, and exercise intolerance  Gastrointestinal: negative for, nausea, vomiting, constipation, and diarrhea  Genitourinary: negative for, nocturia, dysuria, frequency, and urgency  Musculoskeletal: negative for, muscular weakness, nocturnal cramping, and foot pain  Neurologic: negative for, local weakness, numbness or tingling of hands, and numbness or tingling of feet  Psychiatric: positive for excessive stress and depression  Hematologic/Lymphatic/Immunologic: negative    Past Medical History  Past Medical History:   Diagnosis Date     Cervicalgia     Non-specific Cervical Spine Pain     Diabetes mellitus, type 2 (H)      Diabetic ketoacidosis without coma associated with type 2 diabetes mellitus (H)      Fatty liver      History of colonic polyps      Kidney stone      Lumbago     Mechanical Low Back  "Pain     Morbid obesity (H)      Motion sickness      Muscular wasting and disuse atrophy, not elsewhere classified     Deconditioning Syndrome     Parotid nodule      Sleep apnea     cpap     Unspecified chronic suppurative otitis media      Ureteral stone        Medications  Current Outpatient Medications   Medication Sig Dispense Refill     acetaminophen (TYLENOL) 500 MG tablet Take 2 tablets (1,000 mg) by mouth every 6 hours as needed for mild pain       albuterol (PROAIR HFA/PROVENTIL HFA/VENTOLIN HFA) 108 (90 Base) MCG/ACT inhaler Inhale 2 puffs into the lungs every 6 hours as needed for shortness of breath, wheezing or cough 18 g 2     losartan (COZAAR) 25 MG tablet Take 1 tablet (25 mg) by mouth daily. 90 tablet 1     Needle, Disp, (B-D HYPODERMIC NEEDLE) 23G X 1\" MISC 1 each every 14 days 50 each 0     rosuvastatin (CRESTOR) 5 MG tablet Take 1 tablet (5 mg) by mouth daily 90 tablet 4     Semaglutide, 2 MG/DOSE, (OZEMPIC) 8 MG/3ML pen Inject 2 mg Subcutaneous every 7 days 9 mL 3     syringe/needle, disp, 25G X 1\" 3 ML MISC 1 each every 14 days 50 each 0     Testosterone 1.62 % GEL Externally apply 4 Pump topically daily (81 mg) Apply from dispenser to clean, dry, intact skin of the upper arms and shoulders. 150 g 0     testosterone enanthate (DELATESTRYL) 200 MG/ML injection Inject into the muscle every 14 days Inj 0.5 ml IM every 14 days 5 mL 1     Testosterone Enanthate 75 MG/0.5ML SOAJ Inject 75 mg Subcutaneous every 7 days 2 mL 2       Allergies  No Known Allergies    Family History  family history includes Dementia in his father; Diabetes in his father; Neuropathy in his father; No Known Problems in his brother, daughter, sister, and son; Obesity in his brother; Pancreatic Cancer in his mother.    Social History  Social History     Tobacco Use     Smoking status: Never     Smokeless tobacco: Never   Vaping Use     Vaping status: Never Used   Substance Use Topics     Alcohol use: Yes     Comment: " Alcoholic Drinks/day: rare     Drug use: Never       Physical Exam  There is no height or weight on file to calculate BMI.  GENERAL: no distress  SKIN: Visible skin clear. No significant rash, abnormal pigmentation or lesions.  EYES: Eyes grossly normal to inspection.  No discharge or erythema, or obvious scleral/conjunctival abnormalities.  NECK: visible goiter is not present; no visible neck masses  RESP: No audible wheeze, cough, or visible cyanosis.  No visible retractions or increased work of breathing.    NEURO: Awake, alert, responds appropriately to questions.  Mentation and speech fluent.  PSYCH:affect normal and appearance well-groomed.      DATA REVIEW  He has not been monitoring his blood sugars        Again, thank you for allowing me to participate in the care of your patient.        Sincerely,        Barbara Jimenes PA-C

## 2024-12-17 NOTE — PATIENT INSTRUCTIONS
[facility]  Dr Rivas, Endocrinology Department    [address] [id number]  [place] [address]  Appointment [phone]  Fax: [phone]  Verdi: Monday - Thursday

## 2024-12-20 ENCOUNTER — LAB (OUTPATIENT)
Dept: LAB | Facility: HOSPITAL | Age: 59
End: 2024-12-20
Payer: COMMERCIAL

## 2024-12-20 DIAGNOSIS — R79.89 LOW TESTOSTERONE: ICD-10-CM

## 2024-12-20 LAB
HCT VFR BLD AUTO: 44.3 % (ref 40–53)
HGB BLD-MCNC: 15.3 G/DL (ref 13.3–17.7)
SHBG SERPL-SCNC: 32 NMOL/L (ref 11–80)

## 2024-12-20 PROCEDURE — 84403 ASSAY OF TOTAL TESTOSTERONE: CPT

## 2024-12-20 PROCEDURE — 84270 ASSAY OF SEX HORMONE GLOBUL: CPT

## 2024-12-20 PROCEDURE — 85014 HEMATOCRIT: CPT

## 2024-12-20 PROCEDURE — 85018 HEMOGLOBIN: CPT

## 2024-12-20 PROCEDURE — 36415 COLL VENOUS BLD VENIPUNCTURE: CPT

## 2024-12-24 LAB
TESTOST FREE SERPL-MCNC: 3.73 NG/DL
TESTOST SERPL-MCNC: 193 NG/DL (ref 240–950)

## 2024-12-29 ENCOUNTER — HEALTH MAINTENANCE LETTER (OUTPATIENT)
Age: 59
End: 2024-12-29

## 2025-01-06 DIAGNOSIS — E11.9 TYPE 2 DIABETES MELLITUS WITHOUT COMPLICATION, WITHOUT LONG-TERM CURRENT USE OF INSULIN (H): Primary | ICD-10-CM

## 2025-01-08 ENCOUNTER — OFFICE VISIT (OUTPATIENT)
Dept: INTERNAL MEDICINE | Facility: CLINIC | Age: 60
End: 2025-01-08
Payer: COMMERCIAL

## 2025-01-08 ENCOUNTER — LAB (OUTPATIENT)
Dept: LAB | Facility: HOSPITAL | Age: 60
End: 2025-01-08
Payer: COMMERCIAL

## 2025-01-08 VITALS
SYSTOLIC BLOOD PRESSURE: 135 MMHG | BODY MASS INDEX: 39.37 KG/M2 | DIASTOLIC BLOOD PRESSURE: 80 MMHG | OXYGEN SATURATION: 98 % | WEIGHT: 275 LBS | HEIGHT: 70 IN | HEART RATE: 89 BPM | TEMPERATURE: 97.9 F | RESPIRATION RATE: 16 BRPM

## 2025-01-08 DIAGNOSIS — E66.812 CLASS 2 SEVERE OBESITY DUE TO EXCESS CALORIES WITH SERIOUS COMORBIDITY AND BODY MASS INDEX (BMI) OF 39.0 TO 39.9 IN ADULT (H): ICD-10-CM

## 2025-01-08 DIAGNOSIS — G47.33 OSA ON CPAP: ICD-10-CM

## 2025-01-08 DIAGNOSIS — F39 MOOD DISORDER: ICD-10-CM

## 2025-01-08 DIAGNOSIS — G47.26 EPISODIC CIRCADIAN RHYTHM SLEEP DISORDER, SHIFT WORK TYPE: ICD-10-CM

## 2025-01-08 DIAGNOSIS — E66.01 CLASS 2 SEVERE OBESITY DUE TO EXCESS CALORIES WITH SERIOUS COMORBIDITY AND BODY MASS INDEX (BMI) OF 39.0 TO 39.9 IN ADULT (H): ICD-10-CM

## 2025-01-08 DIAGNOSIS — E11.9 TYPE 2 DIABETES MELLITUS WITHOUT COMPLICATION, WITHOUT LONG-TERM CURRENT USE OF INSULIN (H): ICD-10-CM

## 2025-01-08 DIAGNOSIS — E29.1 HYPOGONADISM MALE: ICD-10-CM

## 2025-01-08 DIAGNOSIS — R53.83 FATIGUE, UNSPECIFIED TYPE: Primary | ICD-10-CM

## 2025-01-08 LAB
EST. AVERAGE GLUCOSE BLD GHB EST-MCNC: 120 MG/DL
HBA1C MFR BLD: 5.8 %

## 2025-01-08 PROCEDURE — G2211 COMPLEX E/M VISIT ADD ON: HCPCS | Performed by: INTERNAL MEDICINE

## 2025-01-08 PROCEDURE — 99214 OFFICE O/P EST MOD 30 MIN: CPT | Performed by: INTERNAL MEDICINE

## 2025-01-08 PROCEDURE — 83036 HEMOGLOBIN GLYCOSYLATED A1C: CPT

## 2025-01-08 PROCEDURE — 36415 COLL VENOUS BLD VENIPUNCTURE: CPT

## 2025-01-08 RX ORDER — BUPROPION HYDROCHLORIDE 150 MG/1
150 TABLET ORAL EVERY MORNING
Qty: 90 TABLET | Refills: 4 | Status: SHIPPED | OUTPATIENT
Start: 2025-01-08

## 2025-01-08 ASSESSMENT — PAIN SCALES - GENERAL: PAINLEVEL_OUTOF10: NO PAIN (0)

## 2025-01-08 NOTE — PROGRESS NOTES
Office Visit - Follow Up   Juanito Delacruz   59 year old male    Date of Visit: 1/8/2025    Chief Complaint   Patient presents with    Recheck Medication     Evaluation of medications. He has had increased fatigue. His sleep pattern is very compromised. He needs to speak about his testosterone treatment.             Assessment and Plan   1. Fatigue, unspecified type (Primary)  Likely multifactorial, component of work related fatigue affecting sleep, shift work, component of low testosterone, sleep apnea, medications and probable mood or attentional disorder.  I have encouraged him to get another assessment for ADHD.  In the meantime I recommended Wellbutrin to see if this helps.  I like to see him back in about a month to review.  I also think he should give his testosterone a month.  He may benefit from further decreasing the dosing interval and achieving normal testosterone level.    2. Hypogonadism male  Continue testosterone as above per endocrinology    3. Type 2 diabetes mellitus without complication, without long-term current use of insulin (H)  Has been well-controlled but his weight has not been decreasing recently.  Discussed switching to tirzepatide which she will consider    4. JUAN on CPAP    5. Mood disorder  - buPROPion (WELLBUTRIN XL) 150 MG 24 hr tablet; Take 1 tablet (150 mg) by mouth every morning.  Dispense: 90 tablet; Refill: 4    6. Episodic circadian rhythm sleep disorder, shift work type    7. Class 2 severe obesity due to excess calories with serious comorbidity and body mass index (BMI) of 39.0 to 39.9 in adult (H)  The following high BMI interventions were performed this visit: encouragement to exercise and lifestyle education regarding diet    Return in about 4 weeks (around 2/5/2025) for Follow up.     History of Present Illness   This 59 year old man comes in for follow-up.  He is struggling a bit with fatigue.  This is multifactorial.  He still occasionally work odd shifts and long  "shifts and be quite fatigued afterwards.  Occasionally these shifts can be overnight or into the evening which can disrupt his sleep.  He has been found to have low testosterone has been working with testosterone replacement.  For the first 3 days after replacement he feels much better but then it seems to wear off.  He had a repeat testosterone level and it has not gone up.  He is increased the frequency of testosterone dose but not the dose.  He has yet really made this change.  He has had some issues with attention and previously had an evaluation for ADD which sounds like it was nondiagnostic.  His daughter was recently diagnosed with ADD.  He continues to use his CPAP.  He is on Ozempic for weight loss but has not lost much weight recently.       Physical Exam   General Appearance:   No acute distress    /80   Pulse 89   Temp 97.9  F (36.6  C) (Tympanic)   Resp 16   Ht 1.778 m (5' 10\")   Wt 124.7 kg (275 lb)   SpO2 98%   BMI 39.46 kg/m           Additional Information   Current Outpatient Medications   Medication Sig Dispense Refill    albuterol (PROAIR HFA/PROVENTIL HFA/VENTOLIN HFA) 108 (90 Base) MCG/ACT inhaler Inhale 2 puffs into the lungs every 6 hours as needed for shortness of breath, wheezing or cough 18 g 2    buPROPion (WELLBUTRIN XL) 150 MG 24 hr tablet Take 1 tablet (150 mg) by mouth every morning. 90 tablet 4    losartan (COZAAR) 25 MG tablet Take 1 tablet (25 mg) by mouth daily. 90 tablet 1    Needle, Disp, (B-D HYPODERMIC NEEDLE) 23G X 1\" MISC 1 each every 14 days 50 each 0    rosuvastatin (CRESTOR) 5 MG tablet Take 1 tablet (5 mg) by mouth daily 90 tablet 4    Semaglutide, 2 MG/DOSE, (OZEMPIC) 8 MG/3ML pen Inject 2 mg Subcutaneous every 7 days 9 mL 3    syringe/needle, disp, 25G X 1\" 3 ML MISC 1 each every 14 days 50 each 0    testosterone enanthate (DELATESTRYL) 200 MG/ML injection Inject 0.5 mL into the muscle every 10 days. 5 mL 2       Time:     The longitudinal plan of care for " the diagnosis(es)/condition(s) as documented were addressed during this visit. Due to the added complexity in care, I will continue to support Ken in the subsequent management and with ongoing continuity of care.     Archie Mosher MD  Answers submitted by the patient for this visit:  General Questionnaire (Submitted on 1/8/2025)  Chief Complaint: Chronic problems general questions HPI Form  What is the reason for your visit today? : fatigue, med eval  How many servings of fruits and vegetables do you eat daily?: 2-3  On average, how many sweetened beverages do you drink each day (Examples: soda, juice, sweet tea, etc.  Do NOT count diet or artificially sweetened beverages)?: 0  How many minutes a day do you exercise enough to make your heart beat faster?: 10 to 19  How many days a week do you exercise enough to make your heart beat faster?: 4  How many days per week do you miss taking your medication?: 0  Questionnaire about: Chronic problems general questions HPI Form (Submitted on 1/8/2025)  Chief Complaint: Chronic problems general questions HPI Form

## 2025-02-12 ENCOUNTER — OFFICE VISIT (OUTPATIENT)
Dept: INTERNAL MEDICINE | Facility: CLINIC | Age: 60
End: 2025-02-12
Payer: COMMERCIAL

## 2025-02-12 ENCOUNTER — TELEPHONE (OUTPATIENT)
Dept: INTERNAL MEDICINE | Facility: CLINIC | Age: 60
End: 2025-02-12

## 2025-02-12 VITALS
TEMPERATURE: 98 F | DIASTOLIC BLOOD PRESSURE: 82 MMHG | RESPIRATION RATE: 18 BRPM | HEART RATE: 68 BPM | OXYGEN SATURATION: 97 % | SYSTOLIC BLOOD PRESSURE: 130 MMHG | WEIGHT: 273.6 LBS | HEIGHT: 70 IN | BODY MASS INDEX: 39.17 KG/M2

## 2025-02-12 DIAGNOSIS — R41.840 INATTENTION: ICD-10-CM

## 2025-02-12 DIAGNOSIS — E66.812 CLASS 2 SEVERE OBESITY DUE TO EXCESS CALORIES WITH SERIOUS COMORBIDITY AND BODY MASS INDEX (BMI) OF 39.0 TO 39.9 IN ADULT (H): ICD-10-CM

## 2025-02-12 DIAGNOSIS — E29.1 HYPOGONADISM MALE: ICD-10-CM

## 2025-02-12 DIAGNOSIS — Z11.59 NEED FOR HEPATITIS C SCREENING TEST: ICD-10-CM

## 2025-02-12 DIAGNOSIS — Z11.4 SCREENING FOR HIV (HUMAN IMMUNODEFICIENCY VIRUS): ICD-10-CM

## 2025-02-12 DIAGNOSIS — E11.9 TYPE 2 DIABETES MELLITUS WITHOUT COMPLICATION, WITHOUT LONG-TERM CURRENT USE OF INSULIN (H): Primary | ICD-10-CM

## 2025-02-12 DIAGNOSIS — E66.01 CLASS 2 SEVERE OBESITY DUE TO EXCESS CALORIES WITH SERIOUS COMORBIDITY AND BODY MASS INDEX (BMI) OF 39.0 TO 39.9 IN ADULT (H): ICD-10-CM

## 2025-02-12 DIAGNOSIS — G47.33 OSA ON CPAP: ICD-10-CM

## 2025-02-12 DIAGNOSIS — F39 MOOD DISORDER: ICD-10-CM

## 2025-02-12 LAB
HCV AB SERPL QL IA: NONREACTIVE
HIV 1+2 AB+HIV1 P24 AG SERPL QL IA: NONREACTIVE
SHBG SERPL-SCNC: 26 NMOL/L (ref 11–80)

## 2025-02-12 PROCEDURE — 86803 HEPATITIS C AB TEST: CPT | Performed by: INTERNAL MEDICINE

## 2025-02-12 PROCEDURE — 99214 OFFICE O/P EST MOD 30 MIN: CPT | Performed by: INTERNAL MEDICINE

## 2025-02-12 PROCEDURE — 87389 HIV-1 AG W/HIV-1&-2 AB AG IA: CPT | Performed by: INTERNAL MEDICINE

## 2025-02-12 PROCEDURE — 84270 ASSAY OF SEX HORMONE GLOBUL: CPT | Performed by: INTERNAL MEDICINE

## 2025-02-12 PROCEDURE — 36415 COLL VENOUS BLD VENIPUNCTURE: CPT | Performed by: INTERNAL MEDICINE

## 2025-02-12 PROCEDURE — G2211 COMPLEX E/M VISIT ADD ON: HCPCS | Performed by: INTERNAL MEDICINE

## 2025-02-12 RX ORDER — BUPROPION HYDROCHLORIDE 300 MG/1
300 TABLET ORAL EVERY MORNING
Qty: 90 TABLET | Refills: 4 | Status: SHIPPED | OUTPATIENT
Start: 2025-02-12

## 2025-02-12 ASSESSMENT — PAIN SCALES - GENERAL: PAINLEVEL_OUTOF10: MILD PAIN (3)

## 2025-02-12 NOTE — TELEPHONE ENCOUNTER
February 12, 2025    Outside records received from [facility].  Records were placed in the inbox for Dr. Mosher to review.  A copy was sent to HIM to be scanned into the patient's chart.    Jenny Ruiz

## 2025-02-12 NOTE — PROGRESS NOTES
Office Visit - Follow Up   Juanito Delacruz   59 year old male    Date of Visit: 2/12/2025    Chief Complaint   Patient presents with    Follow Up     Pt would like to review medications. Pt reports he has belching and gas/bloating that he would like to discuss.         Assessment and Plan   1. Type 2 diabetes mellitus without complication, without long-term current use of insulin (H) (Primary)  This has been fairly well-controlled, continue with semaglutide    2. Hypogonadism male  He is on 100 mg of testosterone every 10 days.  He is wondering if this dose or frequency should be increased.  He is following with endocrinology.  He is not sure about follow-up.  I suggested we check his testosterone today and this can be forwarded to his endocrinology team.  - Testosterone Free and Total; Future  - Testosterone Free and Total    3. Inattention  4. Mood disorder  Certainly possible he has ADHD, and recommended an assessment, increase Wellbutrin  - buPROPion (WELLBUTRIN XL) 300 MG 24 hr tablet; Take 1 tablet (300 mg) by mouth every morning.  Dispense: 90 tablet; Refill: 4    5. JUAN on CPAP    6. Need for hepatitis C screening test  - Hepatitis C Screen Reflex to HCV RNA Quant and Genotype; Future  - Hepatitis C Screen Reflex to HCV RNA Quant and Genotype    7. Screening for HIV (human immunodeficiency virus)  - HIV Antigen Antibody Combo; Future  - HIV Antigen Antibody Combo    8. Class 2 severe obesity due to excess calories with serious comorbidity and body mass index (BMI) of 39.0 to 39.9 in adult (H)  The following high BMI interventions were performed this visit: encouragement to exercise and lifestyle education regarding diet    Return in about 2 months (around 4/12/2025) for Routine preventive.     History of Present Illness   This 59 year old man comes in for follow-up.  Overall he is stable.  Some possible benefits from Wellbutrin.  Still significant inattention and focus.  He is contemplating getting another  "assessment for ADHD.  He has not yet gotten this done.  He has been using testosterone every 10 days and he is not sure about the follow-up plan regarding checking levels and dose titration.       Physical Exam   General Appearance:   No acute distress    /82 (BP Location: Left arm, Patient Position: Sitting, Cuff Size: Adult Large)   Pulse 68   Temp 98  F (36.7  C) (Temporal)   Resp 18   Ht 1.778 m (5' 10\")   Wt 124.1 kg (273 lb 9.6 oz)   SpO2 97%   BMI 39.26 kg/m           Additional Information   Current Outpatient Medications   Medication Sig Dispense Refill    albuterol (PROAIR HFA/PROVENTIL HFA/VENTOLIN HFA) 108 (90 Base) MCG/ACT inhaler Inhale 2 puffs into the lungs every 6 hours as needed for shortness of breath, wheezing or cough 18 g 2    buPROPion (WELLBUTRIN XL) 300 MG 24 hr tablet Take 1 tablet (300 mg) by mouth every morning. 90 tablet 4    losartan (COZAAR) 25 MG tablet Take 1 tablet (25 mg) by mouth daily. 90 tablet 1    Needle, Disp, (B-D HYPODERMIC NEEDLE) 23G X 1\" MISC 1 each every 14 days 50 each 0    rosuvastatin (CRESTOR) 5 MG tablet Take 1 tablet (5 mg) by mouth daily 90 tablet 4    Semaglutide, 2 MG/DOSE, (OZEMPIC) 8 MG/3ML pen Inject 2 mg Subcutaneous every 7 days 9 mL 3    syringe/needle, disp, 25G X 1\" 3 ML MISC 1 each every 14 days 50 each 0    testosterone enanthate (DELATESTRYL) 200 MG/ML injection Inject 0.5 mL into the muscle every 10 days. 5 mL 2       Time:     The longitudinal plan of care for the diagnosis(es)/condition(s) as documented were addressed during this visit. Due to the added complexity in care, I will continue to support Ken in the subsequent management and with ongoing continuity of care.     Archie Mosher MD  Answers submitted by the patient for this visit:  General Questionnaire (Submitted on 2/12/2025)  Chief Complaint: Chronic problems general questions HPI Form  What is the reason for your visit today? : Follow up appt. Review medications, review " belching, gas/bloating  How many servings of fruits and vegetables do you eat daily?: 4 or more  On average, how many sweetened beverages do you drink each day (Examples: soda, juice, sweet tea, etc.  Do NOT count diet or artificially sweetened beverages)?: 0  How many minutes a day do you exercise enough to make your heart beat faster?: 10 to 19  How many days a week do you exercise enough to make your heart beat faster?: 4  How many days per week do you miss taking your medication?: 0  Questionnaire about: Chronic problems general questions HPI Form (Submitted on 2/12/2025)  Chief Complaint: Chronic problems general questions HPI Form

## 2025-02-16 LAB
TESTOST FREE SERPL-MCNC: 2.01 NG/DL
TESTOST SERPL-MCNC: 96 NG/DL (ref 240–950)

## 2025-04-16 ENCOUNTER — OFFICE VISIT (OUTPATIENT)
Dept: INTERNAL MEDICINE | Facility: CLINIC | Age: 60
End: 2025-04-16
Payer: COMMERCIAL

## 2025-04-16 VITALS
DIASTOLIC BLOOD PRESSURE: 80 MMHG | WEIGHT: 281.5 LBS | TEMPERATURE: 97.8 F | HEART RATE: 78 BPM | SYSTOLIC BLOOD PRESSURE: 136 MMHG | BODY MASS INDEX: 39.41 KG/M2 | RESPIRATION RATE: 21 BRPM | OXYGEN SATURATION: 98 % | HEIGHT: 71 IN

## 2025-04-16 DIAGNOSIS — G47.33 OSA ON CPAP: ICD-10-CM

## 2025-04-16 DIAGNOSIS — E29.1 HYPOGONADISM MALE: ICD-10-CM

## 2025-04-16 DIAGNOSIS — K76.0 FATTY LIVER: ICD-10-CM

## 2025-04-16 DIAGNOSIS — Z86.0100 HISTORY OF COLONIC POLYPS: ICD-10-CM

## 2025-04-16 DIAGNOSIS — R79.89 LOW TESTOSTERONE: ICD-10-CM

## 2025-04-16 DIAGNOSIS — R41.840 INATTENTION: ICD-10-CM

## 2025-04-16 DIAGNOSIS — Z12.5 SCREENING FOR PROSTATE CANCER: ICD-10-CM

## 2025-04-16 DIAGNOSIS — F39 MOOD DISORDER: ICD-10-CM

## 2025-04-16 DIAGNOSIS — E11.9 TYPE 2 DIABETES MELLITUS WITHOUT COMPLICATION, WITHOUT LONG-TERM CURRENT USE OF INSULIN (H): ICD-10-CM

## 2025-04-16 DIAGNOSIS — Z00.00 ANNUAL PHYSICAL EXAM: Primary | ICD-10-CM

## 2025-04-16 DIAGNOSIS — E66.01 CLASS 2 SEVERE OBESITY DUE TO EXCESS CALORIES WITH SERIOUS COMORBIDITY AND BODY MASS INDEX (BMI) OF 39.0 TO 39.9 IN ADULT (H): ICD-10-CM

## 2025-04-16 DIAGNOSIS — N20.0 KIDNEY STONE: ICD-10-CM

## 2025-04-16 DIAGNOSIS — E66.812 CLASS 2 SEVERE OBESITY DUE TO EXCESS CALORIES WITH SERIOUS COMORBIDITY AND BODY MASS INDEX (BMI) OF 39.0 TO 39.9 IN ADULT (H): ICD-10-CM

## 2025-04-16 PROCEDURE — 3075F SYST BP GE 130 - 139MM HG: CPT | Performed by: INTERNAL MEDICINE

## 2025-04-16 PROCEDURE — 3079F DIAST BP 80-89 MM HG: CPT | Performed by: INTERNAL MEDICINE

## 2025-04-16 PROCEDURE — 99214 OFFICE O/P EST MOD 30 MIN: CPT | Mod: 25 | Performed by: INTERNAL MEDICINE

## 2025-04-16 PROCEDURE — 99396 PREV VISIT EST AGE 40-64: CPT | Performed by: INTERNAL MEDICINE

## 2025-04-16 PROCEDURE — G2211 COMPLEX E/M VISIT ADD ON: HCPCS | Performed by: INTERNAL MEDICINE

## 2025-04-16 RX ORDER — BUPROPION HYDROCHLORIDE 150 MG/1
150 TABLET ORAL EVERY MORNING
Qty: 90 TABLET | Refills: 4 | Status: SHIPPED | OUTPATIENT
Start: 2025-04-16

## 2025-04-16 RX ORDER — TESTOSTERONE ENANTHATE 200 MG/ML
INJECTION, SOLUTION INTRAMUSCULAR
Qty: 5 ML | Refills: 2 | Status: SHIPPED | OUTPATIENT
Start: 2025-04-16

## 2025-04-16 RX ORDER — TESTOSTERONE ENANTHATE 200 MG/ML
INJECTION, SOLUTION INTRAMUSCULAR
Qty: 5 ML | Refills: 2 | Status: SHIPPED | OUTPATIENT
Start: 2025-04-16 | End: 2025-04-16

## 2025-04-16 RX ORDER — DEXTROAMPHETAMINE SACCHARATE, AMPHETAMINE ASPARTATE MONOHYDRATE, DEXTROAMPHETAMINE SULFATE AND AMPHETAMINE SULFATE 2.5; 2.5; 2.5; 2.5 MG/1; MG/1; MG/1; MG/1
10 CAPSULE, EXTENDED RELEASE ORAL DAILY
Qty: 30 CAPSULE | Refills: 0 | Status: SHIPPED | OUTPATIENT
Start: 2025-04-16

## 2025-04-16 SDOH — HEALTH STABILITY: PHYSICAL HEALTH: ON AVERAGE, HOW MANY DAYS PER WEEK DO YOU ENGAGE IN MODERATE TO STRENUOUS EXERCISE (LIKE A BRISK WALK)?: 4 DAYS

## 2025-04-16 SDOH — HEALTH STABILITY: PHYSICAL HEALTH: ON AVERAGE, HOW MANY MINUTES DO YOU ENGAGE IN EXERCISE AT THIS LEVEL?: 30 MIN

## 2025-04-16 ASSESSMENT — SOCIAL DETERMINANTS OF HEALTH (SDOH): HOW OFTEN DO YOU GET TOGETHER WITH FRIENDS OR RELATIVES?: ONCE A WEEK

## 2025-04-16 NOTE — TELEPHONE ENCOUNTER
"Testosterone injection prescribed by Dr. Mosher today, 4/16/25:   testosterone enanthate (DELATESTRYL) 200 MG/ML injection   Sig - Route: Inject into the muscle every 7 days. - Intramuscular     Office Visit Note 4/16/25 by Dr. Mosher states \"We discussed typical dose first testosterone might be closer to 100 mg once a week and therefore this prescription has been updated to reflect that.\"    [facility] calls regarding testosterone prescription, sig does not list injection amount. Informed pharmacist that Dr. Mosher would be asked to confirm dose.   "

## 2025-04-16 NOTE — PROGRESS NOTES
Office Visit - Physical   Juanito Delacruz   59 year old  male    Date of visit: 4/16/2025  Physician: Archie Mosher MD     Assessment and Plan   1. Annual physical exam (Primary)  This is a 5-year-old man with issues as discussed below.  He will get vaccines at the pharmacy    2. Screening for prostate cancer  - Prostate Specific Antigen Screen; Future    3. History of colonic polyps  For colonoscopy in 2027    4. Type 2 diabetes mellitus without complication, without long-term current use of insulin (H)  Blood sugars been well-controlled, continue work on reduction in calories carbohydrates regular exercise and modest weight loss  - Albumin Random Urine Quantitative with Creat Ratio; Future  - CBC with platelets; Future  - Comprehensive metabolic panel; Future  - Hemoglobin A1c; Future  - Lipid panel reflex to direct LDL Fasting; Future  - UA Macroscopic with reflex to Microscopic and Culture; Future  - TSH with free T4 reflex; Future  - tirzepatide (MOUNJARO) 10 MG/0.5ML SOAJ auto-injector pen; Inject 0.5 mLs (10 mg) subcutaneously once a week.  Dispense: 2 mL; Refill: 0    5. Fatty liver  As above    6. Low testosterone  His testosterone and libido have remained low, for unclear reasons he has had difficulty with follow-up with endocrinology.  We discussed typical dose first testosterone might be closer to 100 mg once a week and therefore this prescription has been updated to reflect that  - testosterone enanthate (DELATESTRYL) 200 MG/ML injection; Inject into the muscle every 7 days.  Dispense: 5 mL; Refill: 2    7. Hypogonadism male    8. JUAN on CPAP    9. Kidney stone  Stable    10. Mood disorder  Continues to complain about inattention and disorganization.  Wellbutrin did not help much.  Therefore he will decrease this and add Adderall and follow-up with me in 1 month  - buPROPion (WELLBUTRIN XL) 150 MG 24 hr tablet; Take 1 tablet (150 mg) by mouth every morning.  Dispense: 90 tablet; Refill: 4    11.  I called and spoke to the pharmacist. She said patients insurance will cover the accucheck guide glucometer with all supplies. I sent over the prescriptions. I left a message for the patient to inform her.    Inattention  - amphetamine-dextroamphetamine (ADDERALL XR) 10 MG 24 hr capsule; Take 1 capsule (10 mg) by mouth daily.  Dispense: 30 capsule; Refill: 0    12. Class 2 severe obesity due to excess calories with serious comorbidity and body mass index (BMI) of 39.0 to 39.9 in adult (H)  - tirzepatide (MOUNJARO) 10 MG/0.5ML SOAJ auto-injector pen; Inject 0.5 mLs (10 mg) subcutaneously once a week.  Dispense: 2 mL; Refill: 0  The following high BMI interventions were performed this visit: encouragement to exercise and lifestyle education regarding diet    Return in about 4 weeks (around 5/14/2025) for Follow up.     Chief Complaint   Chief Complaint   Patient presents with    Annual Visit        Patient Profile   Social History     Social History Narrative    Lives with his wife, Dara.  Son Ted (2002, Robert Applebaum MD) and Sophie (2004, school echo tech).  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          Past Medical History   Patient Active Problem List   Diagnosis    Chronic suppurative otitis media    Cervicalgia    Fatty liver    History of colonic polyps    Class 2 severe obesity due to excess calories with serious comorbidity and body mass index (BMI) of 39.0 to 39.9 in adult (H)    UJAN on CPAP    Type 2 diabetes mellitus without complication, without long-term current use of insulin (H)    Kidney stone    Hypogonadism male       Past Surgical History  He has a past surgical history that includes Cystoureteroscopy, With Lithotripsy Using Jalen 120P Laser And Ureteral Stent Insertion (Right, 12/12/2023); Parotidectomy (Right, 12/26/2023); Cystoureteroscopy, With Lithotripsy Using Jalen 120P Laser And Ureteral Stent Insertion (Right, 01/15/2024); Phacoemulsification with standard intraocular lens implant (Right, 9/27/2024); and Phacoemulsification with standard intraocular lens implant (Left, 10/25/2024).     History of Present Illness   This 59 year old man  "comes in for annual visit and follow-up.  We reviewed his issues as above.    Review of Systems: A comprehensive review of systems was negative except as noted.     Medications and Allergies   Current Outpatient Medications   Medication Sig Dispense Refill    amphetamine-dextroamphetamine (ADDERALL XR) 10 MG 24 hr capsule Take 1 capsule (10 mg) by mouth daily. 30 capsule 0    buPROPion (WELLBUTRIN XL) 150 MG 24 hr tablet Take 1 tablet (150 mg) by mouth every morning. 90 tablet 4    losartan (COZAAR) 25 MG tablet Take 1 tablet (25 mg) by mouth daily. 90 tablet 1    Needle, Disp, (B-D HYPODERMIC NEEDLE) 23G X 1\" MISC 1 each every 14 days 50 each 0    rosuvastatin (CRESTOR) 5 MG tablet Take 1 tablet (5 mg) by mouth daily 90 tablet 4    syringe/needle, disp, 25G X 1\" 3 ML MISC 1 each every 14 days 50 each 0    testosterone enanthate (DELATESTRYL) 200 MG/ML injection Inject into the muscle every 7 days. 5 mL 2    tirzepatide (MOUNJARO) 10 MG/0.5ML SOAJ auto-injector pen Inject 0.5 mLs (10 mg) subcutaneously once a week. 2 mL 0    albuterol (PROAIR HFA/PROVENTIL HFA/VENTOLIN HFA) 108 (90 Base) MCG/ACT inhaler Inhale 2 puffs into the lungs every 6 hours as needed for shortness of breath, wheezing or cough (Patient not taking: Reported on 4/16/2025) 18 g 2     No Known Allergies     Family and Social History   Family History   Problem Relation Age of Onset    Pancreatic Cancer Mother         islet cell    Diabetes Father     Neuropathy Father     Dementia Father     No Known Problems Sister     Obesity Brother     No Known Problems Brother     Attention Deficit Disorder Daughter     No Known Problems Son         Social History     Tobacco Use    Smoking status: Never    Smokeless tobacco: Never   Vaping Use    Vaping status: Never Used   Substance Use Topics    Alcohol use: Yes     Comment: Alcoholic Drinks/day: rare    Drug use: Never        Physical Exam   General Appearance:   No acute distress    /80 (BP Location: " "Left arm, Patient Position: Sitting, Cuff Size: Adult Regular)   Pulse 78   Temp 97.8  F (36.6  C) (Tympanic)   Resp 21   Ht 1.806 m (5' 11.1\")   Wt 127.7 kg (281 lb 8 oz)   SpO2 98%   BMI 39.15 kg/m      EYES: Eyelids, conjunctiva, and sclera were normal.  HEAD, EARS, NOSE, MOUTH, AND THROAT: Head and face were normal. Hearing was normal to voice and the ears were normal to external exam. Nose appearance was normal and there was no discharge.   NECK: Neck appearance was normal.  RESPIRATORY: Breathing pattern was normal and the chest moved symmetrically.   Lung sounds were normal and there were no abnormal sounds.  CARDIOVASCULAR: Heart rate and rhythm were normal.  S1 and S2 were normal and there were no extra sounds or murmurs.   There was no peripheral edema.  GASTROINTESTINAL: The abdomen was normal in contour.   NEUROLOGIC: The patient was alert and oriented to person, place, time, and circumstance. Speech was normal. Cranial nerves were normal. Motor strength was normal for age. The patient was normally coordinated.  PSYCHIATRIC:  Mood and affect were normal and the patient had normal recent and remote memory. The patient's judgment and insight were normal.     Additional Information   The longitudinal plan of care for the diagnosis(es)/condition(s) as documented were addressed during this visit. Due to the added complexity in care, I will continue to support Ken in the subsequent management and with ongoing continuity of care.       Archie Mosher MD  Internal Medicine  Contact me at [phone]  "

## 2025-04-19 ENCOUNTER — HEALTH MAINTENANCE LETTER (OUTPATIENT)
Age: 60
End: 2025-04-19

## 2025-04-20 DIAGNOSIS — E11.9 TYPE 2 DIABETES MELLITUS WITHOUT COMPLICATION, WITHOUT LONG-TERM CURRENT USE OF INSULIN (H): ICD-10-CM

## 2025-04-21 RX ORDER — ROSUVASTATIN CALCIUM 5 MG/1
5 TABLET, COATED ORAL DAILY
Qty: 90 TABLET | Refills: 2 | Status: SHIPPED | OUTPATIENT
Start: 2025-04-21

## 2025-05-14 DIAGNOSIS — E66.01 CLASS 2 SEVERE OBESITY DUE TO EXCESS CALORIES WITH SERIOUS COMORBIDITY AND BODY MASS INDEX (BMI) OF 39.0 TO 39.9 IN ADULT (H): ICD-10-CM

## 2025-05-14 DIAGNOSIS — E66.812 CLASS 2 SEVERE OBESITY DUE TO EXCESS CALORIES WITH SERIOUS COMORBIDITY AND BODY MASS INDEX (BMI) OF 39.0 TO 39.9 IN ADULT (H): ICD-10-CM

## 2025-05-14 DIAGNOSIS — E11.9 TYPE 2 DIABETES MELLITUS WITHOUT COMPLICATION, WITHOUT LONG-TERM CURRENT USE OF INSULIN (H): ICD-10-CM

## 2025-05-19 RX ORDER — TIRZEPATIDE 10 MG/.5ML
INJECTION, SOLUTION SUBCUTANEOUS
Qty: 2 ML | Refills: 0 | Status: SHIPPED | OUTPATIENT
Start: 2025-05-19

## 2025-05-27 ENCOUNTER — TELEPHONE (OUTPATIENT)
Dept: ENDOCRINOLOGY | Facility: CLINIC | Age: 60
End: 2025-05-27
Payer: COMMERCIAL

## 2025-05-27 DIAGNOSIS — I10 BENIGN ESSENTIAL HYPERTENSION: ICD-10-CM

## 2025-05-28 DIAGNOSIS — R41.840 INATTENTION: ICD-10-CM

## 2025-05-28 RX ORDER — LOSARTAN POTASSIUM 25 MG/1
25 TABLET ORAL DAILY
Qty: 90 TABLET | Refills: 1 | Status: SHIPPED | OUTPATIENT
Start: 2025-05-28

## 2025-05-28 RX ORDER — DEXTROAMPHETAMINE SACCHARATE, AMPHETAMINE ASPARTATE MONOHYDRATE, DEXTROAMPHETAMINE SULFATE AND AMPHETAMINE SULFATE 2.5; 2.5; 2.5; 2.5 MG/1; MG/1; MG/1; MG/1
10 CAPSULE, EXTENDED RELEASE ORAL DAILY
Qty: 30 CAPSULE | Refills: 0 | Status: SHIPPED | OUTPATIENT
Start: 2025-05-28

## 2025-05-28 NOTE — TELEPHONE ENCOUNTER
Spoke w/patient he is only able to make it to an appointment at 3:00 PM since he works at [employer] and will be making his way to the appointment after work. Patient is currently schedule for 12.01.2025 at 3:00 PM during a [facility] spot. Please advise if okay to keep that appointment.

## 2025-06-19 ENCOUNTER — OFFICE VISIT (OUTPATIENT)
Dept: INTERNAL MEDICINE | Facility: CLINIC | Age: 60
End: 2025-06-19
Payer: COMMERCIAL

## 2025-06-19 VITALS
HEART RATE: 77 BPM | BODY MASS INDEX: 36.78 KG/M2 | OXYGEN SATURATION: 97 % | SYSTOLIC BLOOD PRESSURE: 132 MMHG | DIASTOLIC BLOOD PRESSURE: 81 MMHG | RESPIRATION RATE: 17 BRPM | TEMPERATURE: 97.7 F | WEIGHT: 262.7 LBS | HEIGHT: 71 IN

## 2025-06-19 DIAGNOSIS — E66.01 CLASS 2 SEVERE OBESITY DUE TO EXCESS CALORIES WITH SERIOUS COMORBIDITY AND BODY MASS INDEX (BMI) OF 39.0 TO 39.9 IN ADULT (H): ICD-10-CM

## 2025-06-19 DIAGNOSIS — Z12.5 SCREENING FOR PROSTATE CANCER: ICD-10-CM

## 2025-06-19 DIAGNOSIS — E29.1 HYPOGONADISM MALE: ICD-10-CM

## 2025-06-19 DIAGNOSIS — E11.9 TYPE 2 DIABETES MELLITUS WITHOUT COMPLICATION, WITHOUT LONG-TERM CURRENT USE OF INSULIN (H): Primary | ICD-10-CM

## 2025-06-19 DIAGNOSIS — E66.812 CLASS 2 SEVERE OBESITY DUE TO EXCESS CALORIES WITH SERIOUS COMORBIDITY AND BODY MASS INDEX (BMI) OF 39.0 TO 39.9 IN ADULT (H): ICD-10-CM

## 2025-06-19 DIAGNOSIS — R41.840 INATTENTION: ICD-10-CM

## 2025-06-19 LAB
ALBUMIN SERPL BCG-MCNC: 4.6 G/DL (ref 3.5–5.2)
ALBUMIN UR-MCNC: NEGATIVE MG/DL
ALP SERPL-CCNC: 56 U/L (ref 40–150)
ALT SERPL W P-5'-P-CCNC: 28 U/L (ref 0–70)
ANION GAP SERPL CALCULATED.3IONS-SCNC: 10 MMOL/L (ref 7–15)
APPEARANCE UR: CLEAR
AST SERPL W P-5'-P-CCNC: 28 U/L (ref 0–45)
BILIRUB SERPL-MCNC: 0.5 MG/DL
BILIRUB UR QL STRIP: NEGATIVE
BUN SERPL-MCNC: 18.9 MG/DL (ref 8–23)
CALCIUM SERPL-MCNC: 9.4 MG/DL (ref 8.8–10.4)
CHLORIDE SERPL-SCNC: 105 MMOL/L (ref 98–107)
CHOLEST SERPL-MCNC: 137 MG/DL
COLOR UR AUTO: YELLOW
CREAT SERPL-MCNC: 1.27 MG/DL (ref 0.67–1.17)
CREAT UR-MCNC: 231 MG/DL
EGFRCR SERPLBLD CKD-EPI 2021: 65 ML/MIN/1.73M2
ERYTHROCYTE [DISTWIDTH] IN BLOOD BY AUTOMATED COUNT: 12.9 % (ref 10–15)
EST. AVERAGE GLUCOSE BLD GHB EST-MCNC: 108 MG/DL
FASTING STATUS PATIENT QL REPORTED: NO
FASTING STATUS PATIENT QL REPORTED: NO
GLUCOSE SERPL-MCNC: 87 MG/DL (ref 70–99)
GLUCOSE UR STRIP-MCNC: NEGATIVE MG/DL
HBA1C MFR BLD: 5.4 % (ref 0–5.6)
HCO3 SERPL-SCNC: 27 MMOL/L (ref 22–29)
HCT VFR BLD AUTO: 46.2 % (ref 40–53)
HDLC SERPL-MCNC: 34 MG/DL
HGB BLD-MCNC: 15.8 G/DL (ref 13.3–17.7)
HGB UR QL STRIP: NEGATIVE
KETONES UR STRIP-MCNC: NEGATIVE MG/DL
LDLC SERPL CALC-MCNC: 80 MG/DL
LEUKOCYTE ESTERASE UR QL STRIP: NEGATIVE
MCH RBC QN AUTO: 30.7 PG (ref 26.5–33)
MCHC RBC AUTO-ENTMCNC: 34.2 G/DL (ref 31.5–36.5)
MCV RBC AUTO: 90 FL (ref 78–100)
MICROALBUMIN UR-MCNC: <12 MG/L
MICROALBUMIN/CREAT UR: NORMAL MG/G{CREAT}
NITRATE UR QL: NEGATIVE
NONHDLC SERPL-MCNC: 103 MG/DL
PH UR STRIP: 6 [PH] (ref 5–8)
PLATELET # BLD AUTO: 177 10E3/UL (ref 150–450)
POTASSIUM SERPL-SCNC: 4.3 MMOL/L (ref 3.4–5.3)
PROT SERPL-MCNC: 7.3 G/DL (ref 6.4–8.3)
PSA SERPL DL<=0.01 NG/ML-MCNC: 1.3 NG/ML (ref 0–4.5)
RBC # BLD AUTO: 5.15 10E6/UL (ref 4.4–5.9)
SODIUM SERPL-SCNC: 142 MMOL/L (ref 135–145)
SP GR UR STRIP: >=1.03 (ref 1–1.03)
TRIGL SERPL-MCNC: 113 MG/DL
TSH SERPL DL<=0.005 MIU/L-ACNC: 0.81 UIU/ML (ref 0.3–4.2)
UROBILINOGEN UR STRIP-ACNC: 1 E.U./DL
WBC # BLD AUTO: 8.5 10E3/UL (ref 4–11)

## 2025-06-19 ASSESSMENT — PAIN SCALES - GENERAL: PAINLEVEL_OUTOF10: NO PAIN (0)

## 2025-06-19 NOTE — PROGRESS NOTES
"  Office Visit - Follow Up   Juanito Delacruz   59 year old male    Date of Visit: 6/19/2025    Chief Complaint   Patient presents with    Follow Up    Recheck Medication     Pt reports that he's here to follow up on medication change        Assessment and Plan   1. Type 2 diabetes mellitus without complication, without long-term current use of insulin (H) (Primary)  Has been well-controlled, continue with current medications including Mounjaro 10 mg weekly  - CBC with platelets; Future  - Comprehensive metabolic panel; Future  - Hemoglobin A1c; Future  - TSH with free T4 reflex; Future  - Albumin Random Urine Quantitative with Creat Ratio; Future  - Lipid panel reflex to direct LDL Non-fasting; Future    2. Hypogonadism male  He is now on testosterone 100 mg IM once weekly and feeling better.  He last injected 7 days ago  - Testosterone, total; Future    3. Screening for prostate cancer  - Prostate Specific Antigen Screen; Future    4. Inattention  Tolerating Adderall, it has been helpful    5. Class 2 severe obesity due to excess calories with serious comorbidity and body mass index (BMI) of 39.0 to 39.9 in adult (H)  The following high BMI interventions were performed this visit: encouragement to exercise and lifestyle education regarding diet    Return in about 3 months (around 9/19/2025) for Follow up.     History of Present Illness   This 59 year old man comes in for follow-up.  Since I last saw him is doing much better.  His weight is down.  He feels the testosterone dose increase has been helpful and Adderall has helped with his mood and focus.       Physical Exam   General Appearance:   No acute distress    /81 (BP Location: Left arm, Patient Position: Sitting, Cuff Size: Adult Large)   Pulse 77   Temp 97.7  F (36.5  C) (Tympanic)   Resp 17   Ht 1.791 m (5' 10.5\")   Wt 119.2 kg (262 lb 11.2 oz)   SpO2 97%   BMI 37.16 kg/m           Additional Information   Current Outpatient Medications " "  Medication Sig Dispense Refill    albuterol (PROAIR HFA/PROVENTIL HFA/VENTOLIN HFA) 108 (90 Base) MCG/ACT inhaler Inhale 2 puffs into the lungs every 6 hours as needed for shortness of breath, wheezing or cough 18 g 2    amphetamine-dextroamphetamine (ADDERALL XR) 10 MG 24 hr capsule Take 1 capsule (10 mg) by mouth daily. 30 capsule 0    buPROPion (WELLBUTRIN XL) 150 MG 24 hr tablet Take 1 tablet (150 mg) by mouth every morning. 90 tablet 4    losartan (COZAAR) 25 MG tablet Take 1 tablet (25 mg) by mouth daily. 90 tablet 1    MOUNJARO 10 MG/0.5ML SOAJ auto-injector pen Inject 0.5 mLs (10 mg) subcutaneously once a week. 2 mL 0    Needle, Disp, (B-D HYPODERMIC NEEDLE) 23G X 1\" MISC 1 each every 14 days 50 each 0    rosuvastatin (CRESTOR) 5 MG tablet Take 1 tablet (5 mg) by mouth daily 90 tablet 2    syringe/needle, disp, 25G X 1\" 3 ML MISC 1 each every 14 days 50 each 0    testosterone enanthate (DELATESTRYL) 200 MG/ML injection Inject into the muscle every 7 days. Inject 100mg into the muscle every 7 days 5 mL 2       Time:     The longitudinal plan of care for the diagnosis(es)/condition(s) as documented were addressed during this visit. Due to the added complexity in care, I will continue to support Ken in the subsequent management and with ongoing continuity of care.     Archie Mosher MD  Answers submitted by the patient for this visit:  Diabetes Visit (Submitted on 6/19/2025)  Chief Complaint: Chronic problems general questions HPI Form  Frequency of checking blood sugars:: not at all  Diabetic concerns:: none  Paraesthesia present:: weight loss  Have you had a diabetic eye exam within the last year?: Yes  Date of last eye exam: : [facility]  Where was this eye exam done?: 6/2024  General Questionnaire (Submitted on 6/19/2025)  Chief Complaint: Chronic problems general questions HPI Form  How many servings of fruits and vegetables do you eat daily?: 4 or more  On average, how many " sweetened beverages do you drink each day (Examples: soda, juice, sweet tea, etc.  Do NOT count diet or artificially sweetened beverages)?: 0  How many minutes a day do you exercise enough to make your heart beat faster?: 20 to 29  How many days a week do you exercise enough to make your heart beat faster?: 4  How many days per week do you miss taking your medication?: 1  What makes it hard for you to take your medication every day?: remembering to take  Questionnaire about: Chronic problems general questions HPI Form (Submitted on 6/19/2025)  Chief Complaint: Chronic problems general questions HPI Form

## 2025-06-20 ENCOUNTER — RESULTS FOLLOW-UP (OUTPATIENT)
Dept: INTERNAL MEDICINE | Facility: CLINIC | Age: 60
End: 2025-06-20

## 2025-06-22 LAB — TESTOST SERPL-MCNC: 460 NG/DL (ref 240–950)

## 2025-06-23 ENCOUNTER — RESULTS FOLLOW-UP (OUTPATIENT)
Dept: INTERNAL MEDICINE | Facility: CLINIC | Age: 60
End: 2025-06-23

## 2025-07-08 DIAGNOSIS — E66.812 CLASS 2 SEVERE OBESITY DUE TO EXCESS CALORIES WITH SERIOUS COMORBIDITY AND BODY MASS INDEX (BMI) OF 39.0 TO 39.9 IN ADULT (H): ICD-10-CM

## 2025-07-08 DIAGNOSIS — E66.01 CLASS 2 SEVERE OBESITY DUE TO EXCESS CALORIES WITH SERIOUS COMORBIDITY AND BODY MASS INDEX (BMI) OF 39.0 TO 39.9 IN ADULT (H): ICD-10-CM

## 2025-07-08 DIAGNOSIS — E11.9 TYPE 2 DIABETES MELLITUS WITHOUT COMPLICATION, WITHOUT LONG-TERM CURRENT USE OF INSULIN (H): ICD-10-CM

## 2025-07-09 RX ORDER — TIRZEPATIDE 10 MG/.5ML
INJECTION, SOLUTION SUBCUTANEOUS
Qty: 6 ML | Refills: 0 | Status: SHIPPED | OUTPATIENT
Start: 2025-07-09

## 2025-07-14 ENCOUNTER — TELEPHONE (OUTPATIENT)
Dept: INTERNAL MEDICINE | Facility: CLINIC | Age: 60
End: 2025-07-14
Payer: COMMERCIAL

## 2025-07-14 DIAGNOSIS — R41.840 INATTENTION: ICD-10-CM

## 2025-07-14 NOTE — TELEPHONE ENCOUNTER
Please start prior authorization:    KEY: [id number]     Disp Refills Start End DANNY   tirzepatide (MOUNJARO) 10 MG/0.5ML SOAJ auto-injector pen 6 mL 0 7/9/2025 -- No   Sig: Inject 0.5 mLs (10 mg) subcutaneously once a week.   Sent to pharmacy as: Mounjaro 10 MG/0.5ML Subcutaneous Solution Auto-injector (tirzepatide)   Class: E-Prescribe   Order: [id number]   E-Prescribing Status: Receipt confirmed by pharmacy (7/9/2025  2:07 PM CDT)     Pharmacy    [facility] - [place], [place] - [id number] [place] [address]     Associated Diagnoses    Type 2 diabetes mellitus without complication, without long-term current use of insulin (H) [E11.9]      Class 2 severe obesity due to excess calories with serious comorbidity and body mass index (BMI) of 39.0 to 39.9 in adult (H) [E66.812, E66.01, Z68.39]          Prescribing Provider's NPI: [id number]  Archie Mosher

## 2025-07-15 RX ORDER — DEXTROAMPHETAMINE SACCHARATE, AMPHETAMINE ASPARTATE MONOHYDRATE, DEXTROAMPHETAMINE SULFATE AND AMPHETAMINE SULFATE 2.5; 2.5; 2.5; 2.5 MG/1; MG/1; MG/1; MG/1
10 CAPSULE, EXTENDED RELEASE ORAL DAILY
Qty: 30 CAPSULE | Refills: 0 | Status: SHIPPED | OUTPATIENT
Start: 2025-07-15

## 2025-07-16 NOTE — TELEPHONE ENCOUNTER
Central Prior Authorization Team   Phone: [phone]    PA Initiation    Medication: tirzepatide (MOUNJARO) 10 MG/0.5ML SOAJ auto-injector pen  Insurance Company: [employer] - Phone [phone] Fax [phone]  Pharmacy Filling the Rx: [facility] - [place], [place] - [address] [facility]  Filling Pharmacy Phone:    Filling Pharmacy Fax:    Start Date: 7/16/2025    [facility] KEY:  [id number]

## 2025-07-17 NOTE — TELEPHONE ENCOUNTER
Prior Authorization Not Needed per Insurance    Medication: tirzepatide (MOUNJARO) 10 MG/0.5ML SOAJ auto-injector pen  Insurance Company: [employer] - Phone [phone] Fax [phone]  Expected CoPay:      Pharmacy Filling the Rx: [facility] - [place], [place] - [address] [facility]  Pharmacy Notified:  Yes      Member has an active PA on file which is expiring on 07/09/2026 and has 998 no. of fills remaining.

## 2025-08-18 DIAGNOSIS — R41.840 INATTENTION: ICD-10-CM

## 2025-08-19 RX ORDER — DEXTROAMPHETAMINE SACCHARATE, AMPHETAMINE ASPARTATE MONOHYDRATE, DEXTROAMPHETAMINE SULFATE AND AMPHETAMINE SULFATE 2.5; 2.5; 2.5; 2.5 MG/1; MG/1; MG/1; MG/1
10 CAPSULE, EXTENDED RELEASE ORAL DAILY
Qty: 30 CAPSULE | Refills: 0 | Status: SHIPPED | OUTPATIENT
Start: 2025-08-19

## (undated) DEVICE — CONTAINER URINE SPEC 4OZ STRL 1053

## (undated) DEVICE — WIRE GUIDE 0.035"X145CM AMPLATZ SUPER STIFF STR M001465230

## (undated) DEVICE — CATH URETERAL OPEN END 5FRX70CM M0064002010

## (undated) DEVICE — SUTURE VICRYL+ 4-0 27IN SH UND VCP415H

## (undated) DEVICE — MAT FLOOR SURGICAL 40X38 0702140238

## (undated) DEVICE — ESU CORD BIPOLAR 12' E0512

## (undated) DEVICE — SU ETHILON 5-0 P-3 18" BLACK 698G

## (undated) DEVICE — SOLUTION IRRIG 2B7127 .9NS 3000ML BAG

## (undated) DEVICE — PREP DYNA-HEX 4% CHG SCRUB 4OZ BOTTLE MDS098710

## (undated) DEVICE — CUSTOM PACK CYSTO PREFERRED SOT5BCYHEA

## (undated) DEVICE — CUSTOM PACK GEN MAJOR SBA5BGMHEA

## (undated) DEVICE — GOWN XLG DISP 9545

## (undated) DEVICE — SUTURE MONOCRYL+ 5-0 18 PS2 UND MCP495G

## (undated) DEVICE — NIM PROBE NS STD INCR PRASS TIP STRL LF DISP 8225825X

## (undated) DEVICE — GLOVE BIOGEL PI INDICATOR 8.0 LF 41680

## (undated) DEVICE — SUCTION MANIFOLD NEPTUNE 2 SYS 1 PORT 702-025-000

## (undated) DEVICE — ENDO SEAL BX PORT BPS-A

## (undated) DEVICE — PREP POVIDONE-IODINE 7.5% SCRUB 4OZ BOTTLE MDS093945

## (undated) DEVICE — ESU GROUND PAD ADULT W/CORD E7507

## (undated) DEVICE — KIT ENDO FIRST STEP DISINFECTANT 200ML W/POUCH EP-4

## (undated) DEVICE — DRAPE IOBAN INCISE 23X17" 6650EZ

## (undated) DEVICE — EYE SOL BSS 500ML

## (undated) DEVICE — ELECTRODE PATIENT RETURN ADULT L10 FT 2 PLATE CORD 0855C

## (undated) DEVICE — EYE PREP BETADINE 5% SOLUTION 30ML 0065-0411-30

## (undated) DEVICE — TUBING SUCTION MEDI-VAC 1/4"X20' N620A

## (undated) DEVICE — SUCTION CANISTER MEDIVAC LINER 3000ML W/LID 65651-530

## (undated) DEVICE — DRAIN RESERVOIR 100ML JP 0070740

## (undated) DEVICE — Device

## (undated) DEVICE — NDL 25GA 2"  8881200441

## (undated) DEVICE — TUBING SET THERMEDX UROLOGY SGL USE LL0006

## (undated) DEVICE — SOL WATER IRRIG 1000ML BOTTLE 2F7114

## (undated) DEVICE — TRAY PREP DRY SKIN SCRUB 067

## (undated) DEVICE — GUIDEWIRE SENSOR DUAL FLEX STR 0.035"X150CM M0066703080

## (undated) DEVICE — GLOVE BIOGEL PI ULTRATOUCH G SZ 7.5 42175

## (undated) DEVICE — LASER FIBER HOLMIUM MOSES 200 D/F/L AC-10030100

## (undated) DEVICE — SUTURE VICRYL+ 4-0 UNDYED PS-2 VCP496H

## (undated) DEVICE — DRAPE U SPLIT 74X120" 29440

## (undated) DEVICE — SPONGE RAY-TEC 4X8" 7318

## (undated) DEVICE — GLOVE BIOGEL PI ULTRATOUCH G SZ 6.0 42160

## (undated) DEVICE — SUTURE SILK 3-0 TIES 18IN A184H

## (undated) DEVICE — BLADE KNIFE SURG 15 371115

## (undated) DEVICE — SPONGE KITNER DISSECTING 7102*

## (undated) DEVICE — ESU ELEC BLADE 2.75" COATED/INSULATED E1455

## (undated) DEVICE — SU SILK 2-0 FS-1 18" 685G

## (undated) DEVICE — DRAIN BLAKE 15FR SIL 2229

## (undated) DEVICE — SHEATH URETERAL ACCESS NAVIGATOR HD 11/13FRX28CM M0062502210

## (undated) DEVICE — MAT INST MAGNETIC 16X20

## (undated) DEVICE — BASKET NITINOL TIPLESS HALO  1.5FRX120CM 554120

## (undated) DEVICE — GOWN LG DISP 9515

## (undated) DEVICE — DRAPE SHEET REV FOLD 3/4 9349

## (undated) DEVICE — ESU LIGASURE OPEN SEALER/DIVIDER SM JAW 16.5MM LF1212A

## (undated) DEVICE — RETR ELASTIC STAYS LONE STAR BLUNT DUAL LEAD 3550-1G

## (undated) DEVICE — SU SILK 2-0 SH 30" K833H

## (undated) DEVICE — SOL NACL 0.9% IRRIG 1000ML BOTTLE 2F7124

## (undated) DEVICE — PREP POVIDONE-IODINE 10% SOLUTION 4OZ BOTTLE MDS093944

## (undated) DEVICE — SU VICRYL+ 3-0 27IN SH UND VCP416H

## (undated) RX ORDER — LIDOCAINE HYDROCHLORIDE 10 MG/ML
INJECTION, SOLUTION EPIDURAL; INFILTRATION; INTRACAUDAL; PERINEURAL
Status: DISPENSED
Start: 2024-01-15

## (undated) RX ORDER — GLYCOPYRROLATE 0.2 MG/ML
INJECTION, SOLUTION INTRAMUSCULAR; INTRAVENOUS
Status: DISPENSED
Start: 2023-12-26

## (undated) RX ORDER — LIDOCAINE HYDROCHLORIDE 10 MG/ML
INJECTION, SOLUTION EPIDURAL; INFILTRATION; INTRACAUDAL; PERINEURAL
Status: DISPENSED
Start: 2023-12-26

## (undated) RX ORDER — ONDANSETRON 2 MG/ML
INJECTION INTRAMUSCULAR; INTRAVENOUS
Status: DISPENSED
Start: 2024-01-15

## (undated) RX ORDER — ONDANSETRON 2 MG/ML
INJECTION INTRAMUSCULAR; INTRAVENOUS
Status: DISPENSED
Start: 2023-12-26

## (undated) RX ORDER — FENTANYL CITRATE 50 UG/ML
INJECTION, SOLUTION INTRAMUSCULAR; INTRAVENOUS
Status: DISPENSED
Start: 2023-12-12

## (undated) RX ORDER — LIDOCAINE HYDROCHLORIDE AND EPINEPHRINE 10; 10 MG/ML; UG/ML
INJECTION, SOLUTION INFILTRATION; PERINEURAL
Status: DISPENSED
Start: 2023-12-26

## (undated) RX ORDER — LIDOCAINE HYDROCHLORIDE 10 MG/ML
INJECTION, SOLUTION EPIDURAL; INFILTRATION; INTRACAUDAL; PERINEURAL
Status: DISPENSED
Start: 2024-10-25

## (undated) RX ORDER — LIDOCAINE HYDROCHLORIDE 10 MG/ML
INJECTION, SOLUTION EPIDURAL; INFILTRATION; INTRACAUDAL; PERINEURAL
Status: DISPENSED
Start: 2024-09-27

## (undated) RX ORDER — PROPOFOL 10 MG/ML
INJECTION, EMULSION INTRAVENOUS
Status: DISPENSED
Start: 2024-01-15

## (undated) RX ORDER — FENTANYL CITRATE 50 UG/ML
INJECTION, SOLUTION INTRAMUSCULAR; INTRAVENOUS
Status: DISPENSED
Start: 2023-12-26

## (undated) RX ORDER — FENTANYL CITRATE 50 UG/ML
INJECTION, SOLUTION INTRAMUSCULAR; INTRAVENOUS
Status: DISPENSED
Start: 2024-01-15

## (undated) RX ORDER — PROPOFOL 10 MG/ML
INJECTION, EMULSION INTRAVENOUS
Status: DISPENSED
Start: 2023-12-26

## (undated) RX ORDER — GINSENG 100 MG
CAPSULE ORAL
Status: DISPENSED
Start: 2023-12-26

## (undated) RX ORDER — DEXAMETHASONE SODIUM PHOSPHATE 10 MG/ML
INJECTION, SOLUTION INTRAMUSCULAR; INTRAVENOUS
Status: DISPENSED
Start: 2024-01-15